# Patient Record
Sex: FEMALE | Race: WHITE | NOT HISPANIC OR LATINO | URBAN - NONMETROPOLITAN AREA
[De-identification: names, ages, dates, MRNs, and addresses within clinical notes are randomized per-mention and may not be internally consistent; named-entity substitution may affect disease eponyms.]

---

## 2020-07-07 ENCOUNTER — TRANSCRIBE ORDERS (OUTPATIENT)
Dept: ADMINISTRATIVE | Facility: HOSPITAL | Age: 70
End: 2020-07-07

## 2020-07-07 DIAGNOSIS — Z01.818 OTHER SPECIFIED PRE-OPERATIVE EXAMINATION: Primary | ICD-10-CM

## 2020-07-10 ENCOUNTER — LAB (OUTPATIENT)
Dept: LAB | Facility: HOSPITAL | Age: 70
End: 2020-07-10

## 2020-07-10 DIAGNOSIS — Z01.818 OTHER SPECIFIED PRE-OPERATIVE EXAMINATION: ICD-10-CM

## 2020-07-10 PROCEDURE — U0004 COV-19 TEST NON-CDC HGH THRU: HCPCS

## 2020-07-10 PROCEDURE — U0002 COVID-19 LAB TEST NON-CDC: HCPCS

## 2020-07-10 PROCEDURE — C9803 HOPD COVID-19 SPEC COLLECT: HCPCS

## 2020-07-11 LAB
REF LAB TEST METHOD: NORMAL
SARS-COV-2 RNA RESP QL NAA+PROBE: NOT DETECTED

## 2020-08-12 ENCOUNTER — TRANSCRIBE ORDERS (OUTPATIENT)
Dept: ADMINISTRATIVE | Facility: HOSPITAL | Age: 70
End: 2020-08-12

## 2020-08-12 DIAGNOSIS — Z01.818 OTHER SPECIFIED PRE-OPERATIVE EXAMINATION: Primary | ICD-10-CM

## 2020-08-14 ENCOUNTER — LAB (OUTPATIENT)
Dept: LAB | Facility: HOSPITAL | Age: 70
End: 2020-08-14

## 2020-08-14 DIAGNOSIS — Z01.818 OTHER SPECIFIED PRE-OPERATIVE EXAMINATION: ICD-10-CM

## 2020-08-14 PROCEDURE — U0002 COVID-19 LAB TEST NON-CDC: HCPCS

## 2020-08-14 PROCEDURE — C9803 HOPD COVID-19 SPEC COLLECT: HCPCS

## 2020-08-14 PROCEDURE — U0004 COV-19 TEST NON-CDC HGH THRU: HCPCS

## 2020-08-17 LAB
REF LAB TEST METHOD: NORMAL
SARS-COV-2 RNA RESP QL NAA+PROBE: NOT DETECTED

## 2022-05-29 ENCOUNTER — HOSPITAL ENCOUNTER (INPATIENT)
Dept: HOSPITAL 79 - ER1 | Age: 72
LOS: 3 days | Discharge: HOME | DRG: 392 | End: 2022-06-01
Attending: INTERNAL MEDICINE | Admitting: INTERNAL MEDICINE
Payer: MEDICARE

## 2022-05-29 VITALS — BODY MASS INDEX: 35.16 KG/M2 | HEIGHT: 68 IN | WEIGHT: 232 LBS

## 2022-05-29 DIAGNOSIS — K57.20: Primary | ICD-10-CM

## 2022-05-29 DIAGNOSIS — Z82.49: ICD-10-CM

## 2022-05-29 DIAGNOSIS — Z79.899: ICD-10-CM

## 2022-05-29 DIAGNOSIS — Z90.49: ICD-10-CM

## 2022-05-29 DIAGNOSIS — Z98.890: ICD-10-CM

## 2022-05-29 DIAGNOSIS — E78.5: ICD-10-CM

## 2022-05-29 DIAGNOSIS — R91.8: ICD-10-CM

## 2022-05-29 DIAGNOSIS — D72.829: ICD-10-CM

## 2022-05-29 LAB
BUN/CREATININE RATIO: 17 (ref 0–10)
BUN/CREATININE RATIO: 20 (ref 0–10)
HGB BLD-MCNC: 15.8 GM/DL (ref 12.3–15.3)
RED BLOOD COUNT: 5 M/UL (ref 4–5.1)
WHITE BLOOD COUNT: 19.2 K/UL (ref 4.5–11)

## 2022-05-29 PROCEDURE — C9113 INJ PANTOPRAZOLE SODIUM, VIA: HCPCS

## 2022-05-30 LAB
BUN/CREATININE RATIO: 22 (ref 0–10)
HGB BLD-MCNC: 14.3 GM/DL (ref 12.3–15.3)
RED BLOOD COUNT: 4.59 M/UL (ref 4–5.1)
WHITE BLOOD COUNT: 17.3 K/UL (ref 4.5–11)

## 2022-05-31 LAB
BUN/CREATININE RATIO: 18 (ref 0–10)
HGB BLD-MCNC: 12.6 GM/DL (ref 12.3–15.3)
RED BLOOD COUNT: 4.03 M/UL (ref 4–5.1)
WHITE BLOOD COUNT: 10 K/UL (ref 4.5–11)

## 2022-06-01 LAB
BUN/CREATININE RATIO: 15 (ref 0–10)
HGB BLD-MCNC: 12.9 GM/DL (ref 12.3–15.3)
RED BLOOD COUNT: 4.13 M/UL (ref 4–5.1)
WHITE BLOOD COUNT: 8.8 K/UL (ref 4.5–11)

## 2024-11-21 NOTE — PROGRESS NOTES
"NAME: Kristyn Goff    : 1950    DATE OF CONSULTATION: 2024    REASON FOR REFERRAL: Malignant neoplasm of upper-outer quadrant of left breast in female, estrogen receptor positive     REFERRING PHYSICIAN:  Winnie He MD     TREATMENT HISTORY:   Left mastectomy and SLNBx  with Prophylactic Contralateral Mastectomy performed by Dr. Winnie He 24    CHIEF COMPLAINT:  Breast Cancer    HISTORY OF PRESENT ILLNESS:   Kristyn Goff is a very pleasant 74 y.o. female who is being seen today in the presence of her son and daughter-in-law at the request of Maria Esther He MD for evaluation and treatment of breast cancer.  Ms. Goff says she first noticed a \"knot\" in her left breast about 15 years prior around .  Then it was maybe the size of a marble.  She says it enlarged over time until it was about the size of a lime.  She was seeing her PEP, JOHN Toledo who noticed the lump during an exam and ordered further evaluation.  She had biopsy of two areas in the L breast as well as L axillary LN and this showed invasive moderately differentiated ductal carcinoma with micropapillary features Tumor was >95% 3+ ER+, 81-90% 2+ [place]+, HER-2/Estelita - (0).  L axillary LN was involved.  Dr. He took her for L mastectomy w/ SLNBx and prophylactic contralateral mastectomy as below on 24.  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 Tumors were 30 mm, 4 mm.  There was associated high grade DCIS.  4/5 LN were involved (3/4 SLN and 1/1 other LN), the largest being 22 mm with extranodal extension.  R breast was without malignancy.    Ms. Goff is recovering reasonably well.  She has healed somewhat slowly and still has L axillary drain in place but she is making steady progress and now says she has minimal output from the axillary drain.  She is to see Dr. He on Wednesday.   She denies weight loss.  No " "fevers or chills. No chest pain or shortness of breath. No abdominal pain, n/v/d/c, no rectal bleeding.  No bony pain. No headaches or visual disturbance.  They do complain that she has developed a \"place\" on her back they are concerned about that they want me to look at today.    Of note, prior to her surgery she had CT imaging as below which showed several small, nonspecific lung nodules in the R lung (5-6 mm).  Of note similar finding was noted on CTPE protocol from 23).  MRI of the brain also showed a 3 mm enhancing lesion in the L frontal bone which could represent hemangioma although metastatic lesion could not be completely excluded.  Bone scan was clear.      PAST MEDICAL HISTORY:  Past Medical History:   Diagnosis Date    Breast cancer     GERD (gastroesophageal reflux disease)     High cholesterol    -  H/o Diverticulosis  -  H/o Hyperparathyroidism    Risk Factors:  Age of Menarche: 9 yo  Age of Menopause: around age 54  Prior Breast Disease: Denies prior bx but says this lesion was present for maybe 15 yrs before she sought attention.  Pregnancies:    Age of 1st pregnancy:23  Family History of Breast Cancer: denies  Family History of Ovarian Cancer:Denies  History of HRT use:  Denies       PAST SURGICAL HISTORY:  Past Surgical History:   Procedure Laterality Date    GALLBLADDER SURGERY     -  Parathyrodectomy  -  S/p mario cataract surgery w/ implant placement  -  CCU    FAMILY HISTORY:  Family History   Problem Relation Age of Onset    Heart attack Father     Hypertension Sister     Heart attack Sister     Diabetes Sister    -  Denies family h/o malignancy of any kind    SOCIAL HISTORY:  Social History     Socioeconomic History    Marital status:    Tobacco Use    Smoking status: Former     Types: Cigarettes    Smokeless tobacco: Never   Vaping Use    Vaping status: Never Used   Substance and Sexual Activity    Alcohol use: Never    Drug use: Never    Sexual activity: Defer   -  .  " "Lives alone.  Used to own a flower shop but retired around 2012.  Former smoker, quit around age 33 yo      REVIEW OF SYSTEMS:   A comprehensive 14 point review of systems was performed.  Significant findings as mentioned above.  All other systems reviewed and are negative.      MEDICATIONS:  The current medication list was reviewed in the EMR    Current Outpatient Medications:     atorvastatin (LIPITOR) 20 MG tablet, Take 1 tablet by mouth Daily., Disp: , Rfl:     hydroCHLOROthiazide 25 MG tablet, Take 1 tablet by mouth Daily., Disp: , Rfl:     sulfamethoxazole-trimethoprim (Bactrim DS) 800-160 MG per tablet, Take 1 tablet by mouth 2 (Two) Times a Day for 10 days., Disp: 20 tablet, Rfl: 0    ALLERGIES:  No Known Allergies    PHYSICAL EXAM:  Vitals:    11/25/24 1348   BP: 137/58   Pulse: 54   Resp: 18   Temp: 97.1 °F (36.2 °C)   TempSrc: Temporal   SpO2: 97%   Weight: 103 kg (227 lb 3.2 oz)   Height: 172.7 cm (68\")   PainSc: 0-No pain   Body surface area is 2.16 meters squared.   Body mass index is 34.55 kg/m².   ECOG score: 0     PHQ-2 Depression Screening  Little interest or pleasure in doing things? Not at all   Feeling down, depressed, or hopeless? Not at all   PHQ-2 Total Score 0         General:  Awake, alert and oriented, in no distress  HEENT:  Pupils are equal, round and reactive to light and accommodation, Extra-ocular movements full, Oropharyx clear, mucous membranes moist  Neck:  No JVD, thyromegaly or lymphadenopathy  CV:  Regular rate and rhythm, no murmurs, rubs or gallops  Resp:  Lungs are clear to auscultation bilaterally, no wheezing  Breast:  S/p mario mastectomies.  Surgical incisions are healing.  There is a little bit of seroma fluid present in the R chest.  She still has some areas that are kind of scabbed over over the medial L chest.  L drain present.    No palpable nodularity or masses.  No axillary LAD on either side.  Abd:  Soft, non-tender, non-distended, bowel sounds present, no palpable " organomegaly or masses  Ext:  No clubbing, cyanosis or Le edema  Back:  Over the lower L back near the waistline, she has an area of cellulitis maybe 8x2.5 cm with induration / thickening and erythema of the skin.  There is some element of abscess formation with purulent drainage.  Lymph:  No cervical, supraclavicular, axillary, inguinal or femoral adenopathy  Neuro:  MS as above, CN II-XII intact, grossly non-focal exam      PATHOLOGY:  8/28/24 11/4/24            ENDOSCOPY:        IMAGING:  Mammo Diagnostic Digital Tomosynthesis Bilateral With CAD (07/19/2024 10:42) & US Breast Left Limited (07/19/2024 11:35)   FINDINGS:    Irregular hypodense mass upper outer left breast, anterior depth, 1-2 o'clock radian, with pleomorphic calcifications, measures 3.9 x 2.4 cm (image 40 MLO, image 37 CC).      Irregular lesion with coarse calcification is seen at left 2-3 o'clock radian, posterior depth measuring 1.7 x 1.5 cm (image 26 MLO, image 23 CC).      Enlarged left axillary lymph nodes are seen.      Benign secretory calcifications are seen bilaterally.      No additional suspicious masses, tissue distortion, or suspicious calcifications are identified.      Ultrasound:      Real time, targeted, technologist performed sonographic imaging of the left breast was performed utilizing a multihertz transducer.       Ultrasound was performed in the left upper outer quadrant and axilla.      --- Lobulated mass is seen at left 11-2 o'clock radian, 4 cm from the nipple, central vascular flow, 5.0 x 2.9 x 3.4 cm. This corresponds to the mammogram.    --- Spiculated lesion at 2-3 o'clock radian, 9 cm from the nipple, measuring 1.7 x 1.0 x 1.1 cm. This corresponds to the distortion seen on mammogram.      Enlarging nodes are seen in the left axilla. The largest node measures 2.0 x 2.5 x 1.3 cm.     IMPRESSION:  1.   No mammographic evidence of malignancy is are seen in the right breast.        2.   Breast masses at left  11-3 o'clock radian. These are highly suspicious for malignant process.    3. Enlarging nodes in the left axilla which are suspicious for metastatic disease.         RECOMMENDED FOLLOWUP: Ultrasound guided biopsy of the large mass at 11-2 o'clock radian, left 2-3 o'clock radian and one of the left axillary lymph nodes.      BI-RADS category 5: Highly suggestive of malignancy.       MRI Abdomen With & Without Contrast (08/14/2024 14:05)   FINDINGS:      LOWER CHEST: Lung bases are clear. Small hiatal hernia.      LIVER: Normal contours. No mass. Vascular shunt in the dome (for example on bolus phase series, image 119). No steatosis. No intrahepatic biliary dilatation. Portal and hepatic veins are patent.      GALLBLADDER: Removed.      COMMON BILE DUCT: Normal caliber. No stones.       SPLEEN: Within normal limits.      PANCREAS: No mass. No pancreatic fluid collections. The pancreatic duct is normal.      ADRENALS: No masses.      KIDNEYS: No solid left renal lesion identified, to correspond to the abnormality described on recent ultrasound. A parapelvic cyst in the left kidney measuring 2 cm. A couple of small cortical cysts in the right kidney measuring up to 1.3 cm. No hydronephrosis.      LYMPH NODES: No adenopathy.      STOMACH, SMALL BOWEL AND COLON: No bowel wall thickening or obstruction.      PERITONEAL CAVITY: No mesenteric stranding or free fluid.      ABDOMINAL AORTA: No aneurysm.      SOFT TISSUES: Normal.      OSSEOUS STRUCTURES: No acute fracture or destructive lesion.     IMPRESSION:  1.   No solid left renal lesion identified, to correspond to the abnormality described on recent ultrasound. Few bilateral renal cysts, including a parapelvic cyst in the left kidney measuring 2 cm.   2.   Additional noncontributory findings described above.     Mammo Diagnostic Digital Tomosynthesis Left With CAD (08/28/2024 09:46) & US Breast Left Limited (08/28/2024 11:11)   FINDINGS:   Findings: There are multiple  suspicious findings in the left breast as listed below:     Finding 1: There is a irregular mass with associated calcifications abutting the skin, measuring mammographically 45 x 42 x 30 mm. Targeted left breast ultrasound at the 1:00 position, 4 cm from the revealed a corresponding sonographic irregular mass with calcifications measuring sonographically 4.6 x 2.7 x 3 cm. This extends to the skin. Finding is hard Elastography. Finding is highly suspicious.     Finding 2: There is a irregular mass is associated architectural distortion and coarse central calcification as well as fine: calcifications in the approximate 2:00 position of the left breast middle to posterior depth, 13 cm from the nipple. Targeted left breast ultrasound the 2:00 position 13 cm from the reveals an irregular 8 x 6 x 6 mm mass, with adjacent vascularity, intermediate Elastography.   Finding is highly suspicious.     Finding 3: In the 12:00 position of the left breast there is a 45 mm span of fine pleomorphic calcifications with linear/segmental distribution with associated subtle asymmetry best appreciated in the magnified views, full field CC, slice 40 of 79 as well as ML slice 47/91. These are located approximately 8 cm from nipple. Targeted right breast ultrasound in the left breast at the 12:00 position 8 cm from the nipple identifies an irregular mass, soft on Elastography, measuring 14 x 11 x 12 mm, which appears underestimated sonographically compared to mammographic span of 45 mm linear calcifications. Finding is highly suspicious     Finding 4: There are additional small highly suspicious asymmetries with microcalcifications scattered throughout the inferior left breast and lower inner quadrant in the approximate 6:00-9:00 position middle to posterior depth: The total span of these lower inner quadrant asymmetries measures up to 10 x 7 x 6 cm as best appreciated on CC full-field, slice 13 of 79 and ML slice 27 out of 92. Findings are  highly suspicious indicating multicentric disease.     Finding 5: There are  2 abnormal lymph nodes in the left axilla corresponding to completely replaced lymph nodes labeled as lymph node1 and 2 on US; these are highly suspicious with no demonstration of fatty hilum. This lymph nodes measure respectively 22 mm and 18 mm each, adjacent to each other.       Right breast: A repeat right diagnostic mammogram was not performed as review of outside right breast mammogram did not reveal any suspicious areas of concern.     IMPRESSION:  BI-RADS Code: BI-RADS 5, Highly suggestive of malignancy.   Multicentric left breast disease with highly suspicious at least 2 left axillary lymph nodes     RECOMMENDATIONS:   Left Breast Recommendations: Ultrasound Guided Breast Biopsy for findings 1 and 2 to confirm multicentric disease.     Fine Needle Aspiration     Right diagnostic mammogram in one year.     US Axilla Left (09/12/2024 12:02)   FINDINGS:   Final mammographic images demonstrate the localizer tag to be located within the left axillary lymph node and adjacent to the twirl clip.     Left Breast Density: The breast tissue is heterogeneously dense, which may obscure small masses.     IMPRESSION:  Successful localization of the left axillary lymph node with a pink smartclip.     MR Head w and wo IV Contrast (09/25/2024 08:32)   Findings:     No enhancing lesions to suggest intracranial metastatic disease.     No evidence of restricted diffusion to suggest acute territorial infarct.     No evidence of mass effect, midline shift, ration, hydrocephalus.     No acute appearing intracranial hemorrhage.  No extra-axial fluid collections.  Scattered periventricular and subcortical T2/FLAIR hyperintense foci within the supratentorial brain, probably related to small vessel ischemic changes.  Age-appropriate ventricular size and configuration.     Basal cisterns are patent.  Major intracranial flow voids are preserved.     Paranasal  sinuses  are clear.  Mastoid cells are clear.  Postsurgical changes of the orbits.     3 mm enhancing lesion within the left frontal bone could be related to hemangioma (series 6 image 20, series 13 image 20), although underlying metastatic disease cannot be totally excluded.  Attention to this area on follow-up imaging.     No additional calvarial lesions.     Impression:  No abnormal intraparenchymal postcontrast enhancement to suggest intraparenchymal metastatic disease.      No acute intracranial process.  Sequelae of small vessel ischemic changes.     3 mm enhancing lesion within the left frontal bone could be related to hemangioma (series 6 image 20, series 13 image 20), although underlying metastatic disease cannot be totally excluded.  Attention to this area on follow-up imaging.     CT Abdomen Pelvis With Contrast (09/25/2024 12:40)   FINDINGS:   Mediastinum and Pleura: No mediastinal or hilar adenopathy. No pleural or pericardial effusion.     Lungs: 5 mm peripheral right upper lobe nodule, image 49 of series 3. Subpleural 6 mm peripheral right middle lobe nodule, image 58 series 3. Subpleural 6 mm right basal nodule, image 73 of series 3. No acute airspace disease. The central airways are unremarkable.     Abdomen and Pelvis: No focal hepatic lesions. Gallbladder is removed. No biliary dilatation. No splenic lesions. Normal adrenal glands. No pancreatic lesions or peripancreatic stranding. Exophytic right renal cortical cysts. No suspicious renal parenchymal lesions. No renal collecting system dilatation.     Calcifications involving abdominal aorta, bilateral iliac vessels and SMA, with associated moderate to significant stenosis of the distal SMA trunk.     No enlarged lymph nodes. No mesenteric or retroperitoneal nodularity. No free fluid or air within the abdomen or pelvis.     Unremarkable stomach. Normal caliber of small and large bowel. Distal colonic diverticulosis without evidence of  complications.     Urinary bladder is within normal limits. No abnormal soft tissue densities in the regions of adnexa.     Musculoskeletal: Right breast is only partially included, without discrete suspicious lesions noted. There is a biopsied lesion within left breast, image 34 of series 2, measures up to 2.1 cm. There is also a left axillary lymphadenopathy, status post biopsy, with example of left axillary lymph node on image 37 of series 2 measuring up to 1.7 cm.     No aggressive osseous lesions or acute fractures.     IMPRESSION:  Left breast lesion and left axillary lymphadenopathy, representing known breast cancer with metastasis.   Several peripheral right lung nodules, nonspecific.   No enlarged mediastinal lymph nodes.   No suspicious lesions within the abdomen or pelvis.   No suspicious osseous lesions are identified.     CT Chest With Contrast Diagnostic (09/25/2024 12:40)   FINDINGS:   Mediastinum and Pleura: No mediastinal or hilar adenopathy. No pleural or pericardial effusion.     Lungs: 5 mm peripheral right upper lobe nodule, image 49 of series 3. Subpleural 6 mm peripheral right middle lobe nodule, image 58 series 3. Subpleural 6 mm right basal nodule, image 73 of series 3. No acute airspace disease. The central airways are unremarkable.     Abdomen and Pelvis: No focal hepatic lesions. Gallbladder is removed. No biliary dilatation. No splenic lesions. Normal adrenal glands. No pancreatic lesions or peripancreatic stranding. Exophytic right renal cortical cysts. No suspicious renal parenchymal lesions. No renal collecting system dilatation.     Calcifications involving abdominal aorta, bilateral iliac vessels and SMA, with associated moderate to significant stenosis of the distal SMA trunk.     No enlarged lymph nodes. No mesenteric or retroperitoneal nodularity. No free fluid or air within the abdomen or pelvis.     Unremarkable stomach. Normal caliber of small and large bowel. Distal colonic  diverticulosis without evidence of complications.     Urinary bladder is within normal limits. No abnormal soft tissue densities in the regions of adnexa.     Musculoskeletal: Right breast is only partially included, without discrete suspicious lesions noted. There is a biopsied lesion within left breast, image 34 of series 2, measures up to 2.1 cm. There is also a left axillary lymphadenopathy, status post biopsy, with example of left axillary lymph node on image 37 of series 2 measuring up to 1.7 cm.     No aggressive osseous lesions or acute fractures.     IMPRESSION:  Left breast lesion and left axillary lymphadenopathy, representing known breast cancer with metastasis.   Several peripheral right lung nodules, nonspecific.   No enlarged mediastinal lymph nodes.   No suspicious lesions within the abdomen or pelvis.   No suspicious osseous lesions are identified.     NM Bone Scan Whole Body (09/25/2024 13:41)   FINDINGS:   Bones: No sites of focal increased (hot) and decreased (cold) uptake in axial and appendicular skeleton to suggest bone metastasis.     Bones/Joints: Sites of mild uptake consistent with benign arthritic, degenerative or post traumatic changes.     Soft-tissues: Asymmetric mild uptake in the left breast soft tissues relative to the right. Two kidneys visualized.     IMPRESSION:  No evidence of osseous metastatic disease.     RECENT LABS:  Lab Results   Component Value Date    WBC 9.58 11/25/2024    HGB 12.2 11/25/2024    HCT 38.4 11/25/2024    MCV 95.3 11/25/2024    RDW 13.2 11/25/2024     11/25/2024    NEUTRORELPCT 72.0 11/25/2024    LYMPHORELPCT 18.3 (L) 11/25/2024    MONORELPCT 7.2 11/25/2024    EOSRELPCT 1.8 11/25/2024    BASORELPCT 0.5 11/25/2024    NEUTROABS 6.90 11/25/2024    LYMPHSABS 1.75 11/25/2024       Lab Results   Component Value Date     11/25/2024    K 4.0 11/25/2024    CO2 24.7 11/25/2024     11/25/2024    BUN 23 11/25/2024    CREATININE 1.11 (H) 11/25/2024     GLUCOSE 120 (H) 11/25/2024    CALCIUM 9.2 11/25/2024    ALKPHOS 158 (H) 11/25/2024    AST 21 11/25/2024    ALT 16 11/25/2024    BILITOT 0.4 11/25/2024    ALBUMIN 4.1 11/25/2024    PROTEINTOT 7.1 11/25/2024         ASSESSMENT & PLAN:  Kristyn Goff is a very pleasant 74 y.o. female with Stage IIIA (pT2(m)N2aMX moderately differentiated, multifocal invasive ductal carcinoma with micropapillary features.  Tumors were 30 mm and 4 mm in diameter.  There was associated high grade DCIS.  Surgical margins were clear.  She had involvement of 3/4 SLN and 1/1 other LN.  Largest LN metastasis was 22 mm with extranodal extension.  Tumor was >95% 3+ ER+, 81-90% 2+ [place]+, HER-2/Estelita - (0).    1.  Left Breast Cancer:  -  Patient and family report that the mass was present for about 15 years before she had it evaluated.  -  She is s/p successful L mastectomy w/ clear margins and prophylactic contralateral mastectomy performed by Dr. Winnie He 11/4/24.  -  She has been a little slow to heal but this is going reasonably well.  She has L axillary drain in place and is to see Dr. He on Wednesday for removal.  -  I have recommended adjuvant chemotherapy to consist of DDAC x 4 with growth factor support followed by weekly Taxol x 12. We discussed potential risks benefits and side effects and she would like to proceed.  Will obtain labwork today.  She will need echocardiogram to make sure LVEF is adequate.  She has no known cardiac history but doesn't often see physicians.  -  Will need PAC placement given vesicant therapy so will refer back to Dr. He for this purpose.  -  At some point, decision will have to be made about whether or not to return to the OR for axillary dissection.    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  -  Following adjuvant radiation she will require hormonal therapy.  Will plan to get DEXA closer to that time.    2.  Abscess L back:  -  This will need I&D I  believe.  She is to see Dr. He on Wednesday.  -  Will start Bactrim DS I BID.  Suspect this will need treatment before PAC can be safely placed.    3.  Abnormal imaging findings:  -  She had CT CAP performed 9/25/24 which showed several tiny, nonspecific lung nodules in the R lung measuring 5-6 mm.  These were previously described on CTPE imaging 4/30/23.  Will need f/u CT imaging after an interval.  -  She also had 3 mm enhancing lesion in the L frontal bone noted on Brain MRI 9/25/24.  Could be c/w hemangioma.  Could not completely r/o metastatic lesion.  Bone scan 9/25/24 was negative.     -  Will plan to reimage after an interval.    4.  Prophylaxis:  -  Kristyn Goff did not receive 2024 flu vaccine.  This was recommended but declined.  -  Kristyn Goff did not receive Prevnar vaccine.  This was recommended but declined.  -  Kristyn Goff did not receive COVID vaccine.  This was recommended but declined.      5.  ACO / DANK/Other  Quality measures  -  Kristyn Goff did not receive 2024 flu vaccine.  This was recommended but declined.  -  Kristyn Goff reports a pain score of 0.  Given her pain assessment as noted, treatment options were discussed and the following options were decided upon as a follow-up plan to address the patient's pain:  No intervention needed at this time .  -  Current outpatient and discharge medications have been reconciled for the patient.  Reviewed by: Kaylee Tellez MD    6.  F/u:  -  Check baseline labwork today  -  Bactrim DS I tab BID x 10d  -  Refer back to Dr. He for possible I&D abscess and PAC placement.  She is to see Dr. He Wednesday and anticpates drain removal at that time.  -  Check echocardiogram  -  Work on chemotherapy approvals  -  Schedule chemotherapy teaching.  -  I will plan to see her back when she starts her treatment to make sure infection is resolved.        I spent 60 minutes with Kristyn Goff today.  In the office today, more than 50%  of this time was spent face-to-face with her  in counseling / coordination of care, reviewing her medical history and counseling on the current treatment plan.  All questions were answered to her satisfaction      Electronically Signed by: Kaylee Tellez MD      CC:     JOHN Toledo DO Veronica Morgan Jones, MD

## 2024-11-25 ENCOUNTER — LAB (OUTPATIENT)
Dept: ONCOLOGY | Facility: CLINIC | Age: 74
End: 2024-11-25
Payer: MEDICARE

## 2024-11-25 ENCOUNTER — CONSULT (OUTPATIENT)
Dept: ONCOLOGY | Facility: CLINIC | Age: 74
End: 2024-11-25
Payer: MEDICARE

## 2024-11-25 VITALS
HEIGHT: 68 IN | SYSTOLIC BLOOD PRESSURE: 137 MMHG | DIASTOLIC BLOOD PRESSURE: 58 MMHG | RESPIRATION RATE: 18 BRPM | OXYGEN SATURATION: 97 % | WEIGHT: 227.2 LBS | HEART RATE: 54 BPM | TEMPERATURE: 97.1 F | BODY MASS INDEX: 34.43 KG/M2

## 2024-11-25 DIAGNOSIS — L02.212 ABSCESS OF BACK: ICD-10-CM

## 2024-11-25 DIAGNOSIS — Z17.0 MALIGNANT NEOPLASM OF OVERLAPPING SITES OF LEFT BREAST IN FEMALE, ESTROGEN RECEPTOR POSITIVE: Primary | ICD-10-CM

## 2024-11-25 DIAGNOSIS — C50.812 MALIGNANT NEOPLASM OF OVERLAPPING SITES OF LEFT BREAST IN FEMALE, ESTROGEN RECEPTOR POSITIVE: ICD-10-CM

## 2024-11-25 DIAGNOSIS — R53.83 OTHER FATIGUE: ICD-10-CM

## 2024-11-25 DIAGNOSIS — C50.812 MALIGNANT NEOPLASM OF OVERLAPPING SITES OF LEFT BREAST IN FEMALE, ESTROGEN RECEPTOR POSITIVE: Primary | ICD-10-CM

## 2024-11-25 DIAGNOSIS — Z17.0 MALIGNANT NEOPLASM OF OVERLAPPING SITES OF LEFT BREAST IN FEMALE, ESTROGEN RECEPTOR POSITIVE: ICD-10-CM

## 2024-11-25 DIAGNOSIS — E21.3 HYPERPARATHYROIDISM: ICD-10-CM

## 2024-11-25 DIAGNOSIS — Z51.81 ENCOUNTER FOR MONITORING CARDIOTOXIC DRUG THERAPY: ICD-10-CM

## 2024-11-25 DIAGNOSIS — Z79.899 ENCOUNTER FOR MONITORING CARDIOTOXIC DRUG THERAPY: ICD-10-CM

## 2024-11-25 LAB
ALBUMIN SERPL-MCNC: 4.1 G/DL (ref 3.5–5.2)
ALBUMIN/GLOB SERPL: 1.4 G/DL
ALP SERPL-CCNC: 158 U/L (ref 39–117)
ALT SERPL W P-5'-P-CCNC: 16 U/L (ref 1–33)
ANION GAP SERPL CALCULATED.3IONS-SCNC: 15.3 MMOL/L (ref 5–15)
AST SERPL-CCNC: 21 U/L (ref 1–32)
BASOPHILS # BLD AUTO: 0.05 10*3/MM3 (ref 0–0.2)
BASOPHILS NFR BLD AUTO: 0.5 % (ref 0–1.5)
BILIRUB SERPL-MCNC: 0.4 MG/DL (ref 0–1.2)
BUN SERPL-MCNC: 23 MG/DL (ref 8–23)
BUN/CREAT SERPL: 20.7 (ref 7–25)
CALCIUM SPEC-SCNC: 9.2 MG/DL (ref 8.6–10.5)
CHLORIDE SERPL-SCNC: 100 MMOL/L (ref 98–107)
CO2 SERPL-SCNC: 24.7 MMOL/L (ref 22–29)
CREAT SERPL-MCNC: 1.11 MG/DL (ref 0.57–1)
DEPRECATED RDW RBC AUTO: 46.2 FL (ref 37–54)
EGFRCR SERPLBLD CKD-EPI 2021: 51.9 ML/MIN/1.73
EOSINOPHIL # BLD AUTO: 0.17 10*3/MM3 (ref 0–0.4)
EOSINOPHIL NFR BLD AUTO: 1.8 % (ref 0.3–6.2)
ERYTHROCYTE [DISTWIDTH] IN BLOOD BY AUTOMATED COUNT: 13.2 % (ref 12.3–15.4)
FERRITIN SERPL-MCNC: 87.37 NG/ML (ref 13–150)
GLOBULIN UR ELPH-MCNC: 3 GM/DL
GLUCOSE SERPL-MCNC: 120 MG/DL (ref 65–99)
HCT VFR BLD AUTO: 38.4 % (ref 34–46.6)
HGB BLD-MCNC: 12.2 G/DL (ref 12–15.9)
IMM GRANULOCYTES # BLD AUTO: 0.02 10*3/MM3 (ref 0–0.05)
IMM GRANULOCYTES NFR BLD AUTO: 0.2 % (ref 0–0.5)
IRON 24H UR-MRATE: 40 MCG/DL (ref 37–145)
IRON SATN MFR SERPL: 9 % (ref 20–50)
LYMPHOCYTES # BLD AUTO: 1.75 10*3/MM3 (ref 0.7–3.1)
LYMPHOCYTES NFR BLD AUTO: 18.3 % (ref 19.6–45.3)
MCH RBC QN AUTO: 30.3 PG (ref 26.6–33)
MCHC RBC AUTO-ENTMCNC: 31.8 G/DL (ref 31.5–35.7)
MCV RBC AUTO: 95.3 FL (ref 79–97)
MONOCYTES # BLD AUTO: 0.69 10*3/MM3 (ref 0.1–0.9)
MONOCYTES NFR BLD AUTO: 7.2 % (ref 5–12)
NEUTROPHILS NFR BLD AUTO: 6.9 10*3/MM3 (ref 1.7–7)
NEUTROPHILS NFR BLD AUTO: 72 % (ref 42.7–76)
NRBC BLD AUTO-RTO: 0 /100 WBC (ref 0–0.2)
PLATELET # BLD AUTO: 398 10*3/MM3 (ref 140–450)
PMV BLD AUTO: 9.8 FL (ref 6–12)
POTASSIUM SERPL-SCNC: 4 MMOL/L (ref 3.5–5.2)
PROT SERPL-MCNC: 7.1 G/DL (ref 6–8.5)
RBC # BLD AUTO: 4.03 10*6/MM3 (ref 3.77–5.28)
SODIUM SERPL-SCNC: 140 MMOL/L (ref 136–145)
TIBC SERPL-MCNC: 459 MCG/DL (ref 298–536)
TRANSFERRIN SERPL-MCNC: 308 MG/DL (ref 200–360)
TSH SERPL DL<=0.05 MIU/L-ACNC: 2.79 UIU/ML (ref 0.27–4.2)
WBC NRBC COR # BLD AUTO: 9.58 10*3/MM3 (ref 3.4–10.8)

## 2024-11-25 PROCEDURE — 99205 OFFICE O/P NEW HI 60 MIN: CPT | Performed by: INTERNAL MEDICINE

## 2024-11-25 PROCEDURE — 84443 ASSAY THYROID STIM HORMONE: CPT | Performed by: INTERNAL MEDICINE

## 2024-11-25 PROCEDURE — 85025 COMPLETE CBC W/AUTO DIFF WBC: CPT | Performed by: INTERNAL MEDICINE

## 2024-11-25 PROCEDURE — 82728 ASSAY OF FERRITIN: CPT | Performed by: INTERNAL MEDICINE

## 2024-11-25 PROCEDURE — 84466 ASSAY OF TRANSFERRIN: CPT | Performed by: INTERNAL MEDICINE

## 2024-11-25 PROCEDURE — 82746 ASSAY OF FOLIC ACID SERUM: CPT | Performed by: INTERNAL MEDICINE

## 2024-11-25 PROCEDURE — 86300 IMMUNOASSAY TUMOR CA 15-3: CPT | Performed by: INTERNAL MEDICINE

## 2024-11-25 PROCEDURE — 1126F AMNT PAIN NOTED NONE PRSNT: CPT | Performed by: INTERNAL MEDICINE

## 2024-11-25 PROCEDURE — 82607 VITAMIN B-12: CPT | Performed by: INTERNAL MEDICINE

## 2024-11-25 PROCEDURE — 80053 COMPREHEN METABOLIC PANEL: CPT | Performed by: INTERNAL MEDICINE

## 2024-11-25 PROCEDURE — 82306 VITAMIN D 25 HYDROXY: CPT | Performed by: INTERNAL MEDICINE

## 2024-11-25 PROCEDURE — 83540 ASSAY OF IRON: CPT | Performed by: INTERNAL MEDICINE

## 2024-11-25 RX ORDER — ATORVASTATIN CALCIUM 20 MG/1
20 TABLET, FILM COATED ORAL DAILY
COMMUNITY
Start: 2024-09-30

## 2024-11-25 RX ORDER — HYDROCHLOROTHIAZIDE 25 MG/1
25 TABLET ORAL DAILY
COMMUNITY
Start: 2024-08-20 | End: 2025-08-20

## 2024-11-25 RX ORDER — SULFAMETHOXAZOLE AND TRIMETHOPRIM 800; 160 MG/1; MG/1
1 TABLET ORAL 2 TIMES DAILY
Qty: 20 TABLET | Refills: 0 | Status: SHIPPED | OUTPATIENT
Start: 2024-11-25 | End: 2024-12-05

## 2024-11-25 NOTE — PROGRESS NOTES
Venipuncture Blood Specimen Collection  Venipuncture performed in right arm by Marta Bazan MA with good hemostasis. Patient tolerated the procedure well without complications.   11/25/24   Marta Bazan MA

## 2024-11-26 DIAGNOSIS — L02.212 ABSCESS OF BACK: ICD-10-CM

## 2024-11-26 DIAGNOSIS — C50.812 MALIGNANT NEOPLASM OF OVERLAPPING SITES OF LEFT BREAST IN FEMALE, ESTROGEN RECEPTOR POSITIVE: Primary | ICD-10-CM

## 2024-11-26 DIAGNOSIS — Z17.0 MALIGNANT NEOPLASM OF OVERLAPPING SITES OF LEFT BREAST IN FEMALE, ESTROGEN RECEPTOR POSITIVE: Primary | ICD-10-CM

## 2024-11-26 LAB
25(OH)D3 SERPL-MCNC: 26.3 NG/ML (ref 30–100)
CANCER AG15-3 SERPL-ACNC: 37.7 U/ML
FOLATE SERPL-MCNC: 7.32 NG/ML (ref 4.78–24.2)
VIT B12 BLD-MCNC: 335 PG/ML (ref 211–946)

## 2024-11-26 RX ORDER — SODIUM CHLORIDE 9 MG/ML
20 INJECTION, SOLUTION INTRAVENOUS ONCE
OUTPATIENT
Start: 2024-12-09

## 2024-11-26 RX ORDER — PALONOSETRON 0.05 MG/ML
0.25 INJECTION, SOLUTION INTRAVENOUS ONCE
OUTPATIENT
Start: 2024-12-09

## 2024-11-26 RX ORDER — DOXORUBICIN HYDROCHLORIDE 2 MG/ML
60 INJECTION, SOLUTION INTRAVENOUS ONCE
OUTPATIENT
Start: 2024-12-09

## 2024-11-26 RX ORDER — ERGOCALCIFEROL 1.25 MG/1
50000 CAPSULE, LIQUID FILLED ORAL WEEKLY
Qty: 4 CAPSULE | Refills: 5 | Status: SHIPPED | OUTPATIENT
Start: 2024-11-26

## 2024-11-27 LAB — CANCER AG27-29 SERPL-ACNC: 44.6 U/ML (ref 0–38.6)

## 2024-12-02 ENCOUNTER — PATIENT OUTREACH (OUTPATIENT)
Dept: ONCOLOGY | Facility: CLINIC | Age: 74
End: 2024-12-02
Payer: MEDICARE

## 2024-12-02 NOTE — SIGNIFICANT NOTE
Called patient on this date. The role of the Nurse Navigator was introduced to patient.  NN addressed illness, understanding, and provided clarification as needed. Time spent talking with patient, empathetic listening provided, and reassurance given. NN contact information was provided and encouraged patient to call with any questions or concerns. Pt. Verbalized understanding. NN will follow and assist PRN.

## 2024-12-11 DIAGNOSIS — Z17.0 MALIGNANT NEOPLASM OF OVERLAPPING SITES OF LEFT BREAST IN FEMALE, ESTROGEN RECEPTOR POSITIVE: Primary | ICD-10-CM

## 2024-12-11 DIAGNOSIS — C50.812 MALIGNANT NEOPLASM OF OVERLAPPING SITES OF LEFT BREAST IN FEMALE, ESTROGEN RECEPTOR POSITIVE: Primary | ICD-10-CM

## 2024-12-11 RX ORDER — SODIUM CHLORIDE 9 MG/ML
20 INJECTION, SOLUTION INTRAVENOUS ONCE
OUTPATIENT
Start: 2024-12-23

## 2024-12-11 RX ORDER — PALONOSETRON 0.05 MG/ML
0.25 INJECTION, SOLUTION INTRAVENOUS ONCE
OUTPATIENT
Start: 2025-01-06

## 2024-12-11 RX ORDER — DOXORUBICIN HYDROCHLORIDE 2 MG/ML
60 INJECTION, SOLUTION INTRAVENOUS ONCE
OUTPATIENT
Start: 2025-01-06

## 2024-12-11 RX ORDER — DOXORUBICIN HYDROCHLORIDE 2 MG/ML
60 INJECTION, SOLUTION INTRAVENOUS ONCE
OUTPATIENT
Start: 2024-12-23

## 2024-12-11 RX ORDER — SODIUM CHLORIDE 9 MG/ML
20 INJECTION, SOLUTION INTRAVENOUS ONCE
OUTPATIENT
Start: 2025-01-06

## 2024-12-11 RX ORDER — PALONOSETRON 0.05 MG/ML
0.25 INJECTION, SOLUTION INTRAVENOUS ONCE
OUTPATIENT
Start: 2024-12-23

## 2025-01-03 ENCOUNTER — HOSPITAL ENCOUNTER (OUTPATIENT)
Facility: HOSPITAL | Age: 75
Discharge: HOME | End: 2025-01-03
Payer: MEDICARE

## 2025-01-03 ENCOUNTER — OFFICE VISIT (OUTPATIENT)
Age: 75
End: 2025-01-03
Payer: MEDICARE

## 2025-01-03 VITALS — HEIGHT: 68 IN | BODY MASS INDEX: 34.4 KG/M2 | WEIGHT: 227 LBS

## 2025-01-03 DIAGNOSIS — C50.919 MALIGNANT NEOPLASM OF FEMALE BREAST, UNSPECIFIED ESTROGEN RECEPTOR STATUS, UNSPECIFIED LATERALITY, UNSPECIFIED SITE OF BREAST: Primary | ICD-10-CM

## 2025-01-03 DIAGNOSIS — Z17.0 MALIGNANT NEOPLASM OF OVERLAPPING SITES OF LEFT BREAST IN FEMALE, ESTROGEN RECEPTOR POSITIVE: ICD-10-CM

## 2025-01-03 DIAGNOSIS — Z51.81 ENCOUNTER FOR MONITORING CARDIOTOXIC DRUG THERAPY: ICD-10-CM

## 2025-01-03 DIAGNOSIS — C50.812 MALIGNANT NEOPLASM OF OVERLAPPING SITES OF LEFT BREAST IN FEMALE, ESTROGEN RECEPTOR POSITIVE: ICD-10-CM

## 2025-01-03 DIAGNOSIS — Z79.899 ENCOUNTER FOR MONITORING CARDIOTOXIC DRUG THERAPY: ICD-10-CM

## 2025-01-03 LAB
AV MEAN PRESS GRAD SYS DOP V1V2: 4.4 MMHG
AV VMAX SYS DOP: 127 CM/SEC
BH CV ECHO MEAS - AO MAX PG: 6.5 MMHG
BH CV ECHO MEAS - AO ROOT DIAM: 3.5 CM
BH CV ECHO MEAS - AO V2 VTI: 29.6 CM
BH CV ECHO MEAS - EDV(MOD-SP4): 34.6 ML
BH CV ECHO MEAS - EF(MOD-SP4): 80.9 %
BH CV ECHO MEAS - ESV(MOD-SP4): 6.6 ML
BH CV ECHO MEAS - IVS/LVPW: 1.07 CM
BH CV ECHO MEAS - IVSD: 2.2 CM
BH CV ECHO MEAS - LA DIMENSION: 3.8 CM
BH CV ECHO MEAS - LAT PEAK E' VEL: 9.8 CM/SEC
BH CV ECHO MEAS - LV DIASTOLIC VOL/BSA (35-75): 16 CM2
BH CV ECHO MEAS - LV MAX PG: 10.2 MMHG
BH CV ECHO MEAS - LV MEAN PG: 4.9 MMHG
BH CV ECHO MEAS - LV SYSTOLIC VOL/BSA (12-30): 3.1 CM2
BH CV ECHO MEAS - LV V1 MAX: 160 CM/SEC
BH CV ECHO MEAS - LV V1 VTI: 34.1 CM
BH CV ECHO MEAS - LVIDD: 2.8 CM
BH CV ECHO MEAS - LVIDS: 1.47 CM
BH CV ECHO MEAS - LVOT DIAM: 1.87 CM
BH CV ECHO MEAS - LVPWD: 2.05 CM
BH CV ECHO MEAS - MED PEAK E' VEL: 8.2 CM/SEC
BH CV ECHO MEAS - MV A MAX VEL: 1.1 CM/SEC
BH CV ECHO MEAS - MV E MAX VEL: 1.3 CM/SEC
BH CV ECHO MEAS - MV E/A: 0.9
BH CV ECHO MEAS - PA ACC TIME: 0.16 SEC
BH CV ECHO MEAS - RAP SYSTOLE: 10 MMHG
BH CV ECHO MEAS - RVSP: 25.4 MMHG
BH CV ECHO MEAS - SV(MOD-SP4): 28 ML
BH CV ECHO MEAS - SVI(MOD-SP4): 13 ML/M2
BH CV ECHO MEAS - TAPSE (>1.6): 2.6 CM
BH CV ECHO MEAS - TR MAX PG: 15.4 MMHG
BH CV ECHO MEAS - TR MAX VEL: 174 CM/SEC
BH CV ECHO MEASUREMENTS AVERAGE E/E' RATIO: 0.14
LEFT ATRIUM VOLUME INDEX: 13.1 ML/M2
LV EF 2D ECHO EST: 55 %

## 2025-01-03 PROCEDURE — 93306 TTE W/DOPPLER COMPLETE: CPT

## 2025-01-03 RX ORDER — SODIUM CHLORIDE 9 MG/ML
40 INJECTION, SOLUTION INTRAVENOUS AS NEEDED
OUTPATIENT
Start: 2025-01-03

## 2025-01-03 RX ORDER — SODIUM CHLORIDE 0.9 % (FLUSH) 0.9 %
3 SYRINGE (ML) INJECTION EVERY 12 HOURS SCHEDULED
OUTPATIENT
Start: 2025-01-03

## 2025-01-03 RX ORDER — SODIUM CHLORIDE 0.9 % (FLUSH) 0.9 %
10 SYRINGE (ML) INJECTION AS NEEDED
OUTPATIENT
Start: 2025-01-03

## 2025-01-03 NOTE — H&P (VIEW-ONLY)
"Date of Service: 1/3/2025    Subjective   Kristyn Goff is a 74 y.o. female is being in consultation today at the request of Tresa Osorio DO    Chief Complaint: Left breast cancer    Kristyn Goff is a 74 y.o. female with left breast cancer status post left mastectomy and sentinel lymph node biopsy and prophylactic right mastectomy with need for port placement.  She is not on blood thinners.    Past Medical History:   Diagnosis Date    Breast cancer     GERD (gastroesophageal reflux disease)     High cholesterol        Past Surgical History:   Procedure Laterality Date    GALLBLADDER SURGERY           Family History   Problem Relation Age of Onset    Heart attack Father     Hypertension Sister     Heart attack Sister     Diabetes Sister          Social History     Socioeconomic History    Marital status:    Tobacco Use    Smoking status: Former     Types: Cigarettes    Smokeless tobacco: Never   Vaping Use    Vaping status: Never Used   Substance and Sexual Activity    Alcohol use: Never    Drug use: Never    Sexual activity: Defer        Review of Systems   Pertinent items are noted in HPI     Constitutional: No fevers, chills or malaise. No unintentional weight loss   Eyes: Denies visual changes    Cardiovascular: Denies chest pain, palpitations   Respiratory: Denies cough or shortness of breath   Abdominal/Gastrointestinal: No abdominal pain, no nausea/vomiting   Genitourinary: Denies dysuria or hematuria   Musculoskeletal: Denies any chronic joint aches, pains or deformities              Skin: No lesions or rashes   Psychiatric: No recent mood changes   Neurologic: No paresthesias or loss of function        Objective       Physical Exam:      01/03/25  0845   Weight: 103 kg (227 lb)    Body mass index is 34.52 kg/m².  Constitution: Ht 172.7 cm (67.99\")   Wt 103 kg (227 lb)   BMI 34.52 kg/m²  . No acute distress  Head: Normocephalic, atraumatic.   Eyes: Aligned without strabismus. " Conjunctivae noninjected   Ears, Nose, Mouth:no lesions noted  CV: Regular rate and rhythm   Respiratory: Symmetric chest expansion. No respiratory distress.   Gastrointestinal:  Soft, nontender, nondistended   Skin:  No cyanosis, clubbing or edema bilaterally  Neurologic: No gross deficits.  Alert and oriented x3  Psychiatric: Normal mood      Assessment   Diagnoses and all orders for this visit:    1. Malignant neoplasm of female breast, unspecified estrogen receptor status, unspecified laterality, unspecified site of breast (Primary)  -     Case Request; Standing  -     Follow Anesthesia Guidelines / Protocol; Future  -     Follow Anesthesia Guidelines / Protocol; Standing  -     Verify / Perform Chlorhexidine Skin Prep; Standing  -     Notify Physician - Standard; Standing  -     Instructions on coughing, deep breathing, and incentive spirometry.; Standing  -     Type & Screen; Future  -     Insert Peripheral IV; Standing  -     Saline Lock & Maintain IV Access; Standing  -     sodium chloride 0.9 % flush 3 mL  -     sodium chloride 0.9 % flush 10 mL  -     sodium chloride 0.9 % infusion 40 mL  -     Place Sequential Compression Device; Standing  -     Maintain Sequential Compression Device; Standing  -     Case Request    2. Malignant neoplasm of overlapping sites of left breast in female, estrogen receptor positive      Kristyn Goff is a 74 y.o. female with need for central venous access for chemotherapy. I will plan for port placement on 1/13/25.            Karen Engle MD  [facility] Surgery

## 2025-01-03 NOTE — PROGRESS NOTES
"Date of Service: 1/3/2025    Subjective   Kristyn Goff is a 74 y.o. female is being in consultation today at the request of Tresa Osorio DO    Chief Complaint: Left breast cancer    Kristyn Goff is a 74 y.o. female with left breast cancer status post left mastectomy and sentinel lymph node biopsy and prophylactic right mastectomy with need for port placement.  She is not on blood thinners.    Past Medical History:   Diagnosis Date    Breast cancer     GERD (gastroesophageal reflux disease)     High cholesterol        Past Surgical History:   Procedure Laterality Date    GALLBLADDER SURGERY           Family History   Problem Relation Age of Onset    Heart attack Father     Hypertension Sister     Heart attack Sister     Diabetes Sister          Social History     Socioeconomic History    Marital status:    Tobacco Use    Smoking status: Former     Types: Cigarettes    Smokeless tobacco: Never   Vaping Use    Vaping status: Never Used   Substance and Sexual Activity    Alcohol use: Never    Drug use: Never    Sexual activity: Defer        Review of Systems   Pertinent items are noted in HPI     Constitutional: No fevers, chills or malaise. No unintentional weight loss   Eyes: Denies visual changes    Cardiovascular: Denies chest pain, palpitations   Respiratory: Denies cough or shortness of breath   Abdominal/Gastrointestinal: No abdominal pain, no nausea/vomiting   Genitourinary: Denies dysuria or hematuria   Musculoskeletal: Denies any chronic joint aches, pains or deformities              Skin: No lesions or rashes   Psychiatric: No recent mood changes   Neurologic: No paresthesias or loss of function        Objective       Physical Exam:      01/03/25  0845   Weight: 103 kg (227 lb)    Body mass index is 34.52 kg/m².  Constitution: Ht 172.7 cm (67.99\")   Wt 103 kg (227 lb)   BMI 34.52 kg/m²  . No acute distress  Head: Normocephalic, atraumatic.   Eyes: Aligned without strabismus. " Conjunctivae noninjected   Ears, Nose, Mouth:no lesions noted  CV: Regular rate and rhythm   Respiratory: Symmetric chest expansion. No respiratory distress.   Gastrointestinal:  Soft, nontender, nondistended   Skin:  No cyanosis, clubbing or edema bilaterally  Neurologic: No gross deficits.  Alert and oriented x3  Psychiatric: Normal mood      Assessment   Diagnoses and all orders for this visit:    1. Malignant neoplasm of female breast, unspecified estrogen receptor status, unspecified laterality, unspecified site of breast (Primary)  -     Case Request; Standing  -     Follow Anesthesia Guidelines / Protocol; Future  -     Follow Anesthesia Guidelines / Protocol; Standing  -     Verify / Perform Chlorhexidine Skin Prep; Standing  -     Notify Physician - Standard; Standing  -     Instructions on coughing, deep breathing, and incentive spirometry.; Standing  -     Type & Screen; Future  -     Insert Peripheral IV; Standing  -     Saline Lock & Maintain IV Access; Standing  -     sodium chloride 0.9 % flush 3 mL  -     sodium chloride 0.9 % flush 10 mL  -     sodium chloride 0.9 % infusion 40 mL  -     Place Sequential Compression Device; Standing  -     Maintain Sequential Compression Device; Standing  -     Case Request    2. Malignant neoplasm of overlapping sites of left breast in female, estrogen receptor positive      Kristyn Goff is a 74 y.o. female with need for central venous access for chemotherapy. I will plan for port placement on 1/13/25.            Karen Engle MD  [facility]- [facility] Surgery

## 2025-01-13 ENCOUNTER — HOSPITAL ENCOUNTER (OUTPATIENT)
Facility: HOSPITAL | Age: 75
Setting detail: HOSPITAL OUTPATIENT SURGERY
Discharge: HOME | End: 2025-01-13
Attending: SURGERY | Admitting: SURGERY
Payer: MEDICARE

## 2025-01-13 ENCOUNTER — APPOINTMENT (OUTPATIENT)
Dept: GENERAL RADIOLOGY | Facility: HOSPITAL | Age: 75
End: 2025-01-13
Payer: MEDICARE

## 2025-01-13 ENCOUNTER — ANESTHESIA (OUTPATIENT)
Dept: PERIOP | Facility: HOSPITAL | Age: 75
End: 2025-01-13
Payer: MEDICARE

## 2025-01-13 ENCOUNTER — ANESTHESIA EVENT (OUTPATIENT)
Dept: PERIOP | Facility: HOSPITAL | Age: 75
End: 2025-01-13
Payer: MEDICARE

## 2025-01-13 VITALS
DIASTOLIC BLOOD PRESSURE: 78 MMHG | BODY MASS INDEX: 34.71 KG/M2 | HEIGHT: 68 IN | WEIGHT: 229 LBS | HEART RATE: 68 BPM | TEMPERATURE: 97.5 F | SYSTOLIC BLOOD PRESSURE: 123 MMHG | RESPIRATION RATE: 18 BRPM | OXYGEN SATURATION: 98 %

## 2025-01-13 DIAGNOSIS — C50.919 MALIGNANT NEOPLASM OF FEMALE BREAST, UNSPECIFIED ESTROGEN RECEPTOR STATUS, UNSPECIFIED LATERALITY, UNSPECIFIED SITE OF BREAST: ICD-10-CM

## 2025-01-13 PROCEDURE — 25010000002 HEPARIN LOCK FLUSH PER 10 UNITS: Performed by: SURGERY

## 2025-01-13 PROCEDURE — 77001 FLUOROGUIDE FOR VEIN DEVICE: CPT | Performed by: SURGERY

## 2025-01-13 PROCEDURE — 25010000002 FENTANYL CITRATE (PF) 50 MCG/ML SOLUTION: Performed by: NURSE ANESTHETIST, CERTIFIED REGISTERED

## 2025-01-13 PROCEDURE — 76000 FLUOROSCOPY <1 HR PHYS/QHP: CPT

## 2025-01-13 PROCEDURE — 36561 INSERT TUNNELED CV CATH: CPT | Performed by: SURGERY

## 2025-01-13 PROCEDURE — 25010000002 CEFAZOLIN PER 500 MG: Performed by: SURGERY

## 2025-01-13 PROCEDURE — 25010000002 LIDOCAINE PF 2% 2 % SOLUTION: Performed by: NURSE ANESTHETIST, CERTIFIED REGISTERED

## 2025-01-13 PROCEDURE — 76937 US GUIDE VASCULAR ACCESS: CPT | Performed by: SURGERY

## 2025-01-13 PROCEDURE — C1788 PORT, INDWELLING, IMP: HCPCS | Performed by: SURGERY

## 2025-01-13 PROCEDURE — 25010000002 PROPOFOL 200 MG/20ML EMULSION: Performed by: NURSE ANESTHETIST, CERTIFIED REGISTERED

## 2025-01-13 PROCEDURE — 71045 X-RAY EXAM CHEST 1 VIEW: CPT

## 2025-01-13 PROCEDURE — 25010000002 BUPIVACAINE 0.5 % SOLUTION: Performed by: SURGERY

## 2025-01-13 PROCEDURE — 25010000002 ONDANSETRON PER 1 MG: Performed by: NURSE ANESTHETIST, CERTIFIED REGISTERED

## 2025-01-13 DEVICE — POWERPORT CLEARVUE ISP IMPLANTABLE PORT WITH ATTACHABLE 8F POLYURETHANE OPEN-ENDED SINGLE-LUMEN VENOUS CATHETER PROCEDURAL KIT
Type: IMPLANTABLE DEVICE | Site: INTERNAL JUGULAR | Status: FUNCTIONAL
Brand: POWERPORT CLEARVUE

## 2025-01-13 RX ORDER — OXYCODONE AND ACETAMINOPHEN 5; 325 MG/1; MG/1
1 TABLET ORAL ONCE AS NEEDED
Status: DISCONTINUED | OUTPATIENT
Start: 2025-01-13 | End: 2025-01-13 | Stop reason: HOSPADM

## 2025-01-13 RX ORDER — SODIUM CHLORIDE 0.9 % (FLUSH) 0.9 %
3 SYRINGE (ML) INJECTION EVERY 12 HOURS SCHEDULED
Status: DISCONTINUED | OUTPATIENT
Start: 2025-01-13 | End: 2025-01-13 | Stop reason: HOSPADM

## 2025-01-13 RX ORDER — FENTANYL CITRATE 50 UG/ML
50 INJECTION, SOLUTION INTRAMUSCULAR; INTRAVENOUS
Status: DISCONTINUED | OUTPATIENT
Start: 2025-01-13 | End: 2025-01-13 | Stop reason: HOSPADM

## 2025-01-13 RX ORDER — SODIUM CHLORIDE 0.9 % (FLUSH) 0.9 %
10 SYRINGE (ML) INJECTION AS NEEDED
Status: DISCONTINUED | OUTPATIENT
Start: 2025-01-13 | End: 2025-01-13 | Stop reason: HOSPADM

## 2025-01-13 RX ORDER — HEPARIN SODIUM (PORCINE) LOCK FLUSH IV SOLN 100 UNIT/ML 100 UNIT/ML
SOLUTION INTRAVENOUS AS NEEDED
Status: DISCONTINUED | OUTPATIENT
Start: 2025-01-13 | End: 2025-01-13 | Stop reason: HOSPADM

## 2025-01-13 RX ORDER — TRAMADOL HYDROCHLORIDE 50 MG/1
50 TABLET ORAL 2 TIMES DAILY PRN
Qty: 10 TABLET | Refills: 0 | Status: SHIPPED | OUTPATIENT
Start: 2025-01-13 | End: 2025-01-15

## 2025-01-13 RX ORDER — FAMOTIDINE 10 MG/ML
INJECTION, SOLUTION INTRAVENOUS AS NEEDED
Status: DISCONTINUED | OUTPATIENT
Start: 2025-01-13 | End: 2025-01-13 | Stop reason: SURG

## 2025-01-13 RX ORDER — PROPOFOL 10 MG/ML
INJECTION, EMULSION INTRAVENOUS AS NEEDED
Status: DISCONTINUED | OUTPATIENT
Start: 2025-01-13 | End: 2025-01-13 | Stop reason: SURG

## 2025-01-13 RX ORDER — MIDAZOLAM HYDROCHLORIDE 1 MG/ML
0.5 INJECTION, SOLUTION INTRAMUSCULAR; INTRAVENOUS
Status: DISCONTINUED | OUTPATIENT
Start: 2025-01-13 | End: 2025-01-13 | Stop reason: HOSPADM

## 2025-01-13 RX ORDER — FENTANYL CITRATE 50 UG/ML
INJECTION, SOLUTION INTRAMUSCULAR; INTRAVENOUS AS NEEDED
Status: DISCONTINUED | OUTPATIENT
Start: 2025-01-13 | End: 2025-01-13 | Stop reason: SURG

## 2025-01-13 RX ORDER — LIDOCAINE HYDROCHLORIDE 20 MG/ML
INJECTION, SOLUTION EPIDURAL; INFILTRATION; INTRACAUDAL; PERINEURAL AS NEEDED
Status: DISCONTINUED | OUTPATIENT
Start: 2025-01-13 | End: 2025-01-13 | Stop reason: SURG

## 2025-01-13 RX ORDER — SODIUM CHLORIDE 9 MG/ML
40 INJECTION, SOLUTION INTRAVENOUS AS NEEDED
Status: DISCONTINUED | OUTPATIENT
Start: 2025-01-13 | End: 2025-01-13 | Stop reason: HOSPADM

## 2025-01-13 RX ORDER — BUPIVACAINE HYDROCHLORIDE 5 MG/ML
INJECTION, SOLUTION PERINEURAL AS NEEDED
Status: DISCONTINUED | OUTPATIENT
Start: 2025-01-13 | End: 2025-01-13 | Stop reason: HOSPADM

## 2025-01-13 RX ORDER — ONDANSETRON 2 MG/ML
4 INJECTION INTRAMUSCULAR; INTRAVENOUS AS NEEDED
Status: DISCONTINUED | OUTPATIENT
Start: 2025-01-13 | End: 2025-01-13 | Stop reason: HOSPADM

## 2025-01-13 RX ORDER — ONDANSETRON 2 MG/ML
INJECTION INTRAMUSCULAR; INTRAVENOUS AS NEEDED
Status: DISCONTINUED | OUTPATIENT
Start: 2025-01-13 | End: 2025-01-13 | Stop reason: SURG

## 2025-01-13 RX ORDER — IPRATROPIUM BROMIDE AND ALBUTEROL SULFATE 2.5; .5 MG/3ML; MG/3ML
3 SOLUTION RESPIRATORY (INHALATION) ONCE AS NEEDED
Status: DISCONTINUED | OUTPATIENT
Start: 2025-01-13 | End: 2025-01-13 | Stop reason: HOSPADM

## 2025-01-13 RX ORDER — SODIUM CHLORIDE 0.9 % (FLUSH) 0.9 %
10 SYRINGE (ML) INJECTION EVERY 12 HOURS SCHEDULED
Status: DISCONTINUED | OUTPATIENT
Start: 2025-01-13 | End: 2025-01-13 | Stop reason: HOSPADM

## 2025-01-13 RX ORDER — MEPERIDINE HYDROCHLORIDE 25 MG/ML
12.5 INJECTION INTRAMUSCULAR; INTRAVENOUS; SUBCUTANEOUS
Status: DISCONTINUED | OUTPATIENT
Start: 2025-01-13 | End: 2025-01-13 | Stop reason: HOSPADM

## 2025-01-13 RX ADMIN — ONDANSETRON 4 MG: 2 INJECTION INTRAMUSCULAR; INTRAVENOUS at 14:33

## 2025-01-13 RX ADMIN — ONDANSETRON 4 MG: 2 INJECTION INTRAMUSCULAR; INTRAVENOUS at 15:25

## 2025-01-13 RX ADMIN — PROPOFOL 160 MG: 10 INJECTION, EMULSION INTRAVENOUS at 14:15

## 2025-01-13 RX ADMIN — FAMOTIDINE 20 MG: 10 INJECTION, SOLUTION INTRAVENOUS at 14:21

## 2025-01-13 RX ADMIN — FENTANYL CITRATE 100 MCG: 50 INJECTION, SOLUTION INTRAMUSCULAR; INTRAVENOUS at 14:15

## 2025-01-13 RX ADMIN — LIDOCAINE HYDROCHLORIDE 100 MG: 20 INJECTION, SOLUTION EPIDURAL; INFILTRATION; INTRACAUDAL; PERINEURAL at 14:15

## 2025-01-13 RX ADMIN — FENTANYL CITRATE 50 MCG: 50 INJECTION, SOLUTION INTRAMUSCULAR; INTRAVENOUS at 15:04

## 2025-01-13 RX ADMIN — CEFAZOLIN 2000 MG: 2 INJECTION, POWDER, FOR SOLUTION INTRAMUSCULAR; INTRAVENOUS at 14:22

## 2025-01-13 RX ADMIN — FENTANYL CITRATE 50 MCG: 50 INJECTION, SOLUTION INTRAMUSCULAR; INTRAVENOUS at 14:59

## 2025-01-13 NOTE — DISCHARGE INSTRUCTIONS
DISCHARGE INSTRUCTIONS    You may shower in 24 hours. It is important that you let soap and water run over your wound to keep it clean. Pat dry with clean towel. Wound does not need to be covered (you have stitches that will dissolve under your skin. You have surgical glue that will fall off on its own).  Do not soak in a tub or go in a pool/swim in water for 2 weeks.  Take over the counter acetaminophen (tylenol) or ibuprofen (advil/motrin) as needed for pain control. These medications may be alternated, follow the recommendations on the medication bottle. Take your prescribed narcotic pain medications as needed for additional pain control. (DO NOT DRIVE WHILE TAKING NARCOTIC PAIN MEDICATION)  Please notify the general surgery clinic should you develop redness, worsening drainage, fever, or increasing pain at your incision site.       Clinic contact information:  [facility]  [facility] Location: [phone]

## 2025-01-13 NOTE — ANESTHESIA PREPROCEDURE EVALUATION
Anesthesia Evaluation     Patient summary reviewed and Nursing notes reviewed   no history of anesthetic complications:   NPO Solid Status: > 8 hours  NPO Liquid Status: > 8 hours           Airway   Mallampati: II  TM distance: >3 FB  Neck ROM: full  Dental - normal exam     Pulmonary - negative pulmonary ROS    breath sounds clear to auscultation  Cardiovascular     ECG reviewed  Rhythm: regular  Rate: normal    (+) hyperlipidemia      Neuro/Psych- negative ROS  GI/Hepatic/Renal/Endo    (+) obesity, GERD    Musculoskeletal (-) negative ROS    Abdominal   (+) obese    Abdomen: soft.   Substance History - negative use     OB/GYN negative ob/gyn ROS         Other      history of cancer (breast) active    ROS/Med Hx Other: 1/3/25    ·  Left ventricular systolic function is normal. Estimated left ventricular EF = 55%  ·  Left ventricular wall thickness is consistent with moderate concentric hypertrophy.  ·  Left ventricular diastolic function is consistent with (grade I) impaired relaxation.  ·  Estimated right ventricular systolic pressure from tricuspid regurgitation is normal (<35 mmHg).                     Anesthesia Plan    ASA 3     general and MAC     (General vs MAC)  intravenous induction     Anesthetic plan, risks, benefits, and alternatives have been provided, discussed and informed consent has been obtained with: patient.  Pre-procedure education provided  Use of blood products discussed with  Consented to blood products.    Plan discussed with CRNA.    CODE STATUS:

## 2025-01-13 NOTE — ANESTHESIA POSTPROCEDURE EVALUATION
Patient: Kristyn Goff    Procedure Summary       Date: 01/13/25 Room / Location:  [facility]  /  [facility]    Anesthesia Start: 1412 Anesthesia Stop: 1450    Procedure: INSERTION VENOUS ACCESS DEVICE (Right) Diagnosis:       Malignant neoplasm of female breast, unspecified estrogen receptor status, unspecified laterality, unspecified site of breast      (Malignant neoplasm of female breast, unspecified estrogen receptor status, unspecified laterality, unspecified site of breast [C50.919])    Surgeons: Karen Johnston MD Provider: Monty Das MD    Anesthesia Type: general, MAC ASA Status: 3            Anesthesia Type: general, MAC    Vitals  Vitals Value Taken Time   /74 01/13/25 1507   Temp 97.9 °F (36.6 °C) 01/13/25 1451   Pulse 79 01/13/25 1511   Resp 20 01/13/25 1506   SpO2 99 % 01/13/25 1511   Vitals shown include unfiled device data.        Post Anesthesia Care and Evaluation    Patient location during evaluation: PACU  Patient participation: complete - patient participated  Level of consciousness: responsive to verbal stimuli  Pain score: 0  Pain management: satisfactory to patient    Airway patency: patent  Anesthetic complications: No anesthetic complications  PONV Status: none  Cardiovascular status: hemodynamically stable  Respiratory status: nasal cannula  Hydration status: acceptable

## 2025-01-13 NOTE — ANESTHESIA PROCEDURE NOTES
Airway  Date/Time: 1/13/2025 2:21 PM  Airway not difficult    General Information and Staff    CRNA/CAA: Pete Cardenas, FALGUNI    Indications and Patient Condition  Mask difficulty assessment: 1 - vent by mask    Final Airway Details  Final airway type: supraglottic airway      Successful airway: Size 3  Airway Seal Pressure (cm H2O): 20     Number of attempts at approach: 1  Assessment: atraumatic intubation

## 2025-01-15 ENCOUNTER — LAB (OUTPATIENT)
Dept: ONCOLOGY | Facility: CLINIC | Age: 75
End: 2025-01-15
Payer: MEDICARE

## 2025-01-15 ENCOUNTER — OFFICE VISIT (OUTPATIENT)
Dept: ONCOLOGY | Facility: CLINIC | Age: 75
End: 2025-01-15
Payer: MEDICARE

## 2025-01-15 VITALS
RESPIRATION RATE: 18 BRPM | WEIGHT: 231.4 LBS | BODY MASS INDEX: 35.18 KG/M2 | OXYGEN SATURATION: 94 % | HEART RATE: 98 BPM | SYSTOLIC BLOOD PRESSURE: 138 MMHG | TEMPERATURE: 97.1 F | DIASTOLIC BLOOD PRESSURE: 71 MMHG

## 2025-01-15 DIAGNOSIS — Z17.0 MALIGNANT NEOPLASM OF OVERLAPPING SITES OF LEFT BREAST IN FEMALE, ESTROGEN RECEPTOR POSITIVE: Primary | ICD-10-CM

## 2025-01-15 DIAGNOSIS — C50.812 MALIGNANT NEOPLASM OF OVERLAPPING SITES OF LEFT BREAST IN FEMALE, ESTROGEN RECEPTOR POSITIVE: Primary | ICD-10-CM

## 2025-01-15 DIAGNOSIS — C50.812 MALIGNANT NEOPLASM OF OVERLAPPING SITES OF LEFT BREAST IN FEMALE, ESTROGEN RECEPTOR POSITIVE: ICD-10-CM

## 2025-01-15 DIAGNOSIS — Z17.0 MALIGNANT NEOPLASM OF OVERLAPPING SITES OF LEFT BREAST IN FEMALE, ESTROGEN RECEPTOR POSITIVE: ICD-10-CM

## 2025-01-15 LAB
ALBUMIN SERPL-MCNC: 3.7 G/DL (ref 3.5–5.2)
ALBUMIN/GLOB SERPL: 1.2 G/DL
ALP SERPL-CCNC: 140 U/L (ref 39–117)
ALT SERPL W P-5'-P-CCNC: 14 U/L (ref 1–33)
ANION GAP SERPL CALCULATED.3IONS-SCNC: 11.5 MMOL/L (ref 5–15)
AST SERPL-CCNC: 21 U/L (ref 1–32)
BASOPHILS # BLD AUTO: 0.04 10*3/MM3 (ref 0–0.2)
BASOPHILS NFR BLD AUTO: 0.5 % (ref 0–1.5)
BILIRUB SERPL-MCNC: 0.3 MG/DL (ref 0–1.2)
BUN SERPL-MCNC: 20 MG/DL (ref 8–23)
BUN/CREAT SERPL: 19.4 (ref 7–25)
CALCIUM SPEC-SCNC: 9 MG/DL (ref 8.6–10.5)
CHLORIDE SERPL-SCNC: 96 MMOL/L (ref 98–107)
CO2 SERPL-SCNC: 29.5 MMOL/L (ref 22–29)
CREAT SERPL-MCNC: 1.03 MG/DL (ref 0.57–1)
DEPRECATED RDW RBC AUTO: 45.1 FL (ref 37–54)
EGFRCR SERPLBLD CKD-EPI 2021: 56.8 ML/MIN/1.73
EOSINOPHIL # BLD AUTO: 0.21 10*3/MM3 (ref 0–0.4)
EOSINOPHIL NFR BLD AUTO: 2.6 % (ref 0.3–6.2)
ERYTHROCYTE [DISTWIDTH] IN BLOOD BY AUTOMATED COUNT: 13.2 % (ref 12.3–15.4)
GLOBULIN UR ELPH-MCNC: 3.1 GM/DL
GLUCOSE SERPL-MCNC: 117 MG/DL (ref 65–99)
HCT VFR BLD AUTO: 39.5 % (ref 34–46.6)
HGB BLD-MCNC: 12.4 G/DL (ref 12–15.9)
IMM GRANULOCYTES # BLD AUTO: 0.03 10*3/MM3 (ref 0–0.05)
IMM GRANULOCYTES NFR BLD AUTO: 0.4 % (ref 0–0.5)
LYMPHOCYTES # BLD AUTO: 1.59 10*3/MM3 (ref 0.7–3.1)
LYMPHOCYTES NFR BLD AUTO: 19.7 % (ref 19.6–45.3)
MCH RBC QN AUTO: 29.5 PG (ref 26.6–33)
MCHC RBC AUTO-ENTMCNC: 31.4 G/DL (ref 31.5–35.7)
MCV RBC AUTO: 93.8 FL (ref 79–97)
MONOCYTES # BLD AUTO: 0.57 10*3/MM3 (ref 0.1–0.9)
MONOCYTES NFR BLD AUTO: 7.1 % (ref 5–12)
NEUTROPHILS NFR BLD AUTO: 5.63 10*3/MM3 (ref 1.7–7)
NEUTROPHILS NFR BLD AUTO: 69.7 % (ref 42.7–76)
NRBC BLD AUTO-RTO: 0 /100 WBC (ref 0–0.2)
PLATELET # BLD AUTO: 277 10*3/MM3 (ref 140–450)
PMV BLD AUTO: 10.6 FL (ref 6–12)
POTASSIUM SERPL-SCNC: 3.5 MMOL/L (ref 3.5–5.2)
PROT SERPL-MCNC: 6.8 G/DL (ref 6–8.5)
RBC # BLD AUTO: 4.21 10*6/MM3 (ref 3.77–5.28)
SODIUM SERPL-SCNC: 137 MMOL/L (ref 136–145)
WBC NRBC COR # BLD AUTO: 8.07 10*3/MM3 (ref 3.4–10.8)

## 2025-01-15 PROCEDURE — 36415 COLL VENOUS BLD VENIPUNCTURE: CPT | Performed by: INTERNAL MEDICINE

## 2025-01-15 PROCEDURE — 80053 COMPREHEN METABOLIC PANEL: CPT | Performed by: INTERNAL MEDICINE

## 2025-01-15 PROCEDURE — 85025 COMPLETE CBC W/AUTO DIFF WBC: CPT | Performed by: INTERNAL MEDICINE

## 2025-01-15 RX ORDER — ONDANSETRON 8 MG/1
8 TABLET, FILM COATED ORAL 3 TIMES DAILY PRN
Qty: 30 TABLET | Refills: 5 | Status: SHIPPED | OUTPATIENT
Start: 2025-01-15

## 2025-01-15 RX ORDER — IBUPROFEN 600 MG/1
600 TABLET, FILM COATED ORAL EVERY 8 HOURS PRN
Qty: 30 TABLET | Refills: 0 | Status: SHIPPED | OUTPATIENT
Start: 2025-01-15

## 2025-01-15 RX ORDER — LIDOCAINE/PRILOCAINE 2.5 %-2.5%
CREAM (GRAM) TOPICAL
Qty: 30 G | Refills: 1 | Status: SHIPPED | OUTPATIENT
Start: 2025-01-15

## 2025-01-15 RX ORDER — LORATADINE 10 MG/1
TABLET ORAL
Qty: 7 TABLET | Refills: 5 | Status: SHIPPED | OUTPATIENT
Start: 2025-01-15

## 2025-01-15 RX ORDER — PROCHLORPERAZINE MALEATE 10 MG
10 TABLET ORAL EVERY 6 HOURS PRN
Qty: 30 TABLET | Refills: 1 | Status: SHIPPED | OUTPATIENT
Start: 2025-01-15

## 2025-01-15 RX ORDER — OLANZAPINE 5 MG/1
TABLET ORAL
Qty: 8 TABLET | Refills: 1 | Status: SHIPPED | OUTPATIENT
Start: 2025-01-15 | End: 2025-02-12

## 2025-01-15 NOTE — PROGRESS NOTES
"CHEMOTHERAPY PREPARATION    Kristyn Goff  [id number]  1950    Chief Complaint: Chemotherapy Education    History of present illness:  Kristyn Goff is a 74 y.o. year old female who is here today for chemotherapy preparation and needs assessment. The patient has been diagnosed with breast cancer and is scheduled to begin treatment on 01/16/2025 with DD AC w/ Pegfilgrastim. She reports that wound to her left lower back is well healed and she has been cleared by wound care. She is wondering what kind of tops she could wear now that she is \"flat in the chest\". We discussed mastectomy bras which she declines at this time. She is otherwise without specific complaints.     Oncology History:    Oncology/Hematology History   Malignant neoplasm of overlapping sites of left breast in female, estrogen receptor positive   11/25/2024 Initial Diagnosis    Malignant neoplasm of overlapping sites of left breast in female, estrogen receptor positive     1/16/2025 -  Chemotherapy    OP Breast DD AC DOXOrubicin / Cyclophosphamide      3/13/2025 -  Chemotherapy    OP BREAST PACLitaxel Adjuvant (Weekly X 12)       Past Medical History:   Diagnosis Date    Breast cancer     Elevated cholesterol     GERD (gastroesophageal reflux disease)     High cholesterol      Past Surgical History:   Procedure Laterality Date    CATARACT EXTRACTION Right     COLONOSCOPY      GALLBLADDER SURGERY      MASTECTOMY Bilateral     PARATHYROIDECTOMY      VENOUS ACCESS DEVICE (PORT) INSERTION Right 1/13/2025    Procedure: INSERTION VENOUS ACCESS DEVICE;  Surgeon: Karen Johnston MD;  Location: [facility];  Service: General;  Laterality: Right;     Family History   Problem Relation Age of Onset    Heart attack Father     Hypertension Sister     Heart attack Sister     Diabetes Sister        Medications: The current medication list was reviewed and reconciled.     Allergies:  has No Known Allergies.    Review of Systems:  A comprehensive 14 point " review of systems was conducted with patient and positive as per HPI otherwise negative.    Physical Exam:    Vitals:    01/15/25 1054   BP: 138/71   Pulse: 98   Resp: 18   Temp: 97.1 °F (36.2 °C)   SpO2: 94%     Vitals:    01/15/25 1054   PainSc:   5        ECOG: (0) Fully Active - Able to Carry On All Pre-disease Performance Without Restriction    General/Constitutional: Awake, alert and oriented. No apparent acute distress is noted.  HEENT: Normocephalic, atraumatic. PERRLA, conjunctiva normal. Extraocular movements are intact.  Neck: No JVD, thyromegaly or cervical lymphadenopathy.  Cardiovascular: S1, S2. Regular rate and rhythm, no murmurs, rubs or gallops.  Respiratory: Pulmonary effort is normal, lungs are clear to auscultation bilaterally. No wheezing, rhonchi or rales. No use of accessory muscles, no retractions.  Abdomen: Soft, non-tender, non-distended with normoactive bowel sounds x 4 quadrants. No palpable hepatosplenomegaly.  Lymph: No cervical, supraclavicular, axillary, inguinal or femoral adenopathy.  Integumentary: Skin warm and dry. No bruising, ecchymosis.  Extremities: No clubbing, cyanosis or edema.  Neurological: Alert and oriented x 3. Grossly non-focal examination.            NEEDS ASSESSMENTS    Genetics  The patient's new diagnosis and family history have been reviewed for genetic counseling needs. A genetic referral is not recommended.     Barriers to care  A barriers form was also completed by the patient today. We discussed services offered by our facility to help her have adequate access to care. The patient was given the name and card for our Oncology Social Worker, Elena Garvin. Based upon barriers assessment today, the patient will not require a follow-up call from the  to further discuss needs.     VAD Assessment  The patient and I discussed planned intervenous chemotherapy as well as other IV treatments that are often needed throughout the course of treatment. These  "may include, but are not limited to blood transfusions, antibiotics, and IV hydration. The vasculature does not appear to be adequate for multiple peripheral IVs throughout their treatment course. Discussed risks and benefits of VADs. The patient would like to pursue Port-A-Cath insertion prior to initiation of treatment. Port-a-cath was placed 01/13/25 per Dr. Johnston.    Advanced Care Planning  The patient and I discussed advanced care planning, \"Conversations that Matter\".   This service was offered, free of charge, for development of advance directives with a certified ACP facilitator.  The patient does not have an up-to-date advanced directive. This document is not on file with our office. The patient is not interested in an appointment with one of our facilitators to create or update their advanced directives.      Palliative Care  The patient and I discussed palliative care services. Palliative care is not the same as Hospice care. This is specialized medical care for people living with serious illness with the goal of improving quality of life for the patient and their family. [facility] has partnered with [facility] Navigators to offer our patients outpatient palliative care early along with their treatment to assist in coordination of care, symptom management, pain management, and medical decision making.  Oncology criteria for palliative care referral is not met at this time. The patient is not interested in a palliative care consultation.     Additional Referral needs  none      CHEMOTHERAPY EDUCATION    Booklets Given: Chemotherapy and You [x]  Eating Hints [x]    Sexuality/Fertility Books []      Chemotherapy/Biotherapy Education Sheets: (list all that apply)  nausea management, acid reflux management, diarrhea management, Cancer resourse contacts information, skin and mouth care, and vaccination information                                                                                              "                                                                    Chemotherapy Regimen:   Treatment Plans       Name Type Plan Dates Plan Provider         Active    OP Breast DD AC DOXOrubicin / Cyclophosphamide  ONCOLOGY TREATMENT 12/8/2024 - Present Kaylee Tellez MD                      TOPICS EDUCATION PROVIDED COMMENTS   ANEMIA:  role of RBC, cause, s/s, ways to manage, role of transfusion [x]    THROMBOCYTOPENIA:  role of platelet, cause, s/s, ways to prevent bleeding, things to avoid, when to seek help [x]    NEUTROPENIA:  role of WBC, cause, infection precautions, s/s of infection, when to call MD [x]    NUTRITION & APPETITE CHANGES:  importance of maintaining healthy diet & weight, ways to manage to improve intake, dietary consult, exercise regimen [x]    DIARRHEA:  causes, s/s of dehydration, ways to manage, dietary changes, when to call MD [x]    CONSTIPATION:  causes, ways to manage, dietary changes, when to call MD [x]    NAUSEA & VOMITING:  cause, use of antiemetics, dietary changes, when to call MD [x]    MOUTH SORES:  causes, oral care, ways to manage [x]    ALOPECIA:  cause, ways to manage, resources [x]    INFERTILITY & SEXUALITY:  causes, fertility preservation options, sexuality changes, ways to manage, importance of birth control [x]    NERVOUS SYSTEM CHANGES:  causes, s/s, neuropathies, cognitive changes, ways to manage [x]    PAIN:  causes, ways to manage [x]    SKIN & NAIL CHANGES:  cause, s/s, ways to manage [x]    ORGAN TOXICITIES:  cause, s/s, need for diagnostic tests, labs, when to notify MD [x]    SURVIVORSHIP:  distress, distress assessment, secondary malignancies, early/late effects, follow-up, social issues, social support [x]    HOME CARE:  use of spill kits, storing of PO chemo, how to manage bodily fluids [x]    MISCELLANEOUS:  drug interactions, administration, vesicant, et [x]      Assessment and Plan:    Diagnoses and all orders for this visit:    1. Malignant neoplasm of  overlapping sites of left breast in female, estrogen receptor positive (Primary)  -     Provider communication  -     OLANZapine (zyPREXA) 5 MG tablet; Take 1 tablet by mouth for 4 doses starting night of chemotherapy.  Dispense: 8 tablet; Refill: 1  -     ondansetron (ZOFRAN) 8 MG tablet; Take 1 tablet by mouth 3 (Three) Times a Day As Needed for Nausea or Vomiting.  Dispense: 30 tablet; Refill: 5    Other orders  -     prochlorperazine (COMPAZINE) 10 MG tablet; Take 1 tablet by mouth Every 6 (Six) Hours As Needed for Nausea or Vomiting.  Dispense: 30 tablet; Refill: 1  -     lidocaine-prilocaine (EMLA) 2.5-2.5 % cream; Apply to port-a-cath site 30 minutes prior to arrival at infusion center. Cover with plastic wrap.  Dispense: 30 g; Refill: 1  -     loratadine (CLARITIN) 10 MG tablet; Take 1 tablet by mouth daily on the day following chemotherapy prior to growth factor injection and continue for 7 days.  Dispense: 7 tablet; Refill: 5  -     ibuprofen (ADVIL,MOTRIN) 600 MG tablet; Take 1 tablet by mouth Every 8 (Eight) Hours As Needed for Mild Pain. Please take on day following chemotherapy prior to growth factor injection.  Dispense: 30 tablet; Refill: 0      - Chemotherapy teaching was also completed today as documented above. Adequate time was given to answer all questions to her satisfaction. Patient and family are aware of their care team members and contact information if they have questions or problems throughout the treatment course. Needs assessments and education has been completed. The patient is adequately prepared to begin treatment as scheduled.   - Reviewed with patient education regarding EMLA cream, Compazine, and Zofran and prescriptions were sent to the pharmacy of patient's choice.  - I advised the patient that she can take Tylenol or Ibuprofen as needed for aches/pains related to cancer/treatment. I also advised patient that she could use Senakot or Miralax as needed for constipation or Imodium  as needed for diarrhea.    - I reviewed with the patient the care team members. I also reviewed the option of the urgent care clinic through our oncology office for evaluation and management of symptoms related to treatment. Patient was provided with phone number to call during regular office hours [phone] press #1 and for treatment related questions call [phone] to speak to a nurse. If after hours or on the weekend call [phone] and ask to page the MD on call for [facility] Oncology services.   - Consent for treatment with Adriamycin, Cyclophosphamide (DD AC) w/ Pegfilgrastim as recommended by Dr. Tellez for the treatment of breast cancer was signed by the patient today.           I spent 60 minutes with Kristyn Goff today.  In the office today, more than 50% of this time was spent face-to-face with her  in counseling / coordination of care, reviewing her interim medical history and counseling on the current treatment plan.  All questions were answered to her satisfaction.             Electronically Signed by: JOHN Peters

## 2025-01-15 NOTE — OP NOTE
OPERATIVE NOTE    Patient Name:  Kristyn Goff  YOB: 1950  [id number]    Date of Surgery:  1/14/2025    PREOPERATIVE DIAGNOSIS: Breast cancer, need for central venous access     POSTOPERATIVE DIAGNOSIS: Same      PROCEDURE PERFORMED: Right internal jugular port placement with fluoroscopic guidance    SURGEON: Karen Johnston MD     Circulator: Annie Ferguson RN  Radiology Technologist: Ramez Moreno, RT  Scrub Person: Namrata Calderón  Assistant: Venessa Rodriguez CSA     Assistant: Venessa Rodriguez CSA    SPECIMENS: None     EBL: 5     ANESTHESIA: General.      FINDINGS:   1. Successful placement of right internal jugular catheter using ultrasound and fluoroscopic guidance.     INDICATIONS: The patient is a 74 y.o. female with history of breast cancer requiring port placement. Risks and benefits were discussed and she elected to proceed with port placement.     DESCRIPTION OF PROCEDURE:   The patient was brought back to the operating room and was induced with general endotracheal anesthesia.  A towel roll was placed between the shoulder blades.  The right and left neck were prepped and the right neck was draped in sterile fashion.  A time-out was called and the operative consent was read to the operating room personnel.  The patient received preoperative antibiotics.  The patient was placed in the Trendelenburg position.  Using ultrasound guidance the right internal jugular vein was cannulated with a single pass of the needle.  A guidewire was advanced down to the superior vena cava and confirmed with fluoroscopy.  Local anesthetic was infiltrated into the skin near the right shoulder.  Using a 15 blade knife a 5 cm incision was made 2 fingerbreadths inferior and parallel to the right clavicle.  Using electrocautery a subcutaneous pocket was created inferior to the incision line.  The Port-A-Cath reservoir was inserted into the pocket and two 3-0 Prolenes were placed  but not tied down. The catheter was tunneled up the right neck.   The Prolene sutures were secured into the pocket. The catheter was cut to the appropriate length once confirmed with fluoroscopy.   A dilator was then advanced over the guidewire down to the superior vena cava under fluoroscopy. The guidewire and dilator were removed from the sheath.The catheter was advanced through the sheath down to the superior vena cava.  This was confirmed by real-time fluoroscopy.  The 3-0 Prolenes were tied down. The reservoir was aspirated easily with blood flow back and flushed with heparinized saline.  The subdermal tissues were reapproximated with a running 3-0 Vicryl suture.  The incision was closed with a 4-0 Monocryl subcuticular stitch. Dermabond was applied over the wound.  The patient tolerated the procedure well.  The patient was extubated in the operating room and taken to the postanesthesia care unit in stable condition and a stat x-ray was ordered to confirm placement.    COMPLICATIONS: None     Karen Johnston MD  1/14/2025  19:51 EST

## 2025-01-15 NOTE — PROGRESS NOTES
Venipuncture Blood Specimen Collection  Venipuncture performed in left hand by Kailyn Sparrow MA with good hemostasis. Patient tolerated the procedure well without complications.   01/15/25   Kailyn Sparrow MA

## 2025-01-15 NOTE — PROGRESS NOTES
"NAME: Kristyn Goff    : 1950    DATE: 2025    DIAGNOSIS: Malignant neoplasm of upper-outer quadrant of left breast in female, estrogen receptor positive     TREATMENT HISTORY:   Left mastectomy and SLNBx  with Prophylactic Contralateral Mastectomy performed by Dr. Winnie He 24      2.        CHIEF COMPLAINT:  Breast Cancer    HISTORY OF PRESENT ILLNESS:   Kristyn Goff is a very pleasant 74 y.o. female who is being seen today in the presence of her son and daughter-in-law at the request of Maria Esther He MD for evaluation and treatment of breast cancer.  Ms. Goff says she first noticed a \"knot\" in her left breast about 15 years prior around .  Then it was maybe the size of a marble.  She says it enlarged over time until it was about the size of a lime.  She was seeing her PEP, JOHN Toledo who noticed the lump during an exam and ordered further evaluation.  She had biopsy of two areas in the L breast as well as L axillary LN and this showed invasive moderately differentiated ductal carcinoma with micropapillary features Tumor was >95% 3+ ER+, 81-90% 2+ MO+, HER-2/Estelita - (0).  L axillary LN was involved.  Dr. He took her for L mastectomy w/ SLNBx and prophylactic contralateral mastectomy as below on 24.  Patholgy showed a Stage IIIA multifocal invasive ductal carcinoma of the left breast with micropapillary features. Tumors were 30 mm, 4 mm.  There was associated high grade DCIS.  4/5 LN were involved (3/4 SLN and 1/1 other LN), the largest being 22 mm with extranodal extension.  R breast was without malignancy.    Ms. Goff is recovering reasonably well.  She has healed somewhat slowly and still has L axillary drain in place but she is making steady progress and now says she has minimal output from the axillary drain.  She is to see Dr. He on Wednesday.   She denies weight loss.  No fevers or chills. No chest pain or shortness of breath. No abdominal " "pain, n/v/d/c, no rectal bleeding.  No bony pain. No headaches or visual disturbance.  They do complain that she has developed a \"place\" on her back they are concerned about that they want me to look at today.    Of note, prior to her surgery she had CT imaging as below which showed several small, nonspecific lung nodules in the R lung (5-6 mm).  Of note similar finding was noted on CTPE protocol from 4/30/23).  MRI of the brain also showed a 3 mm enhancing lesion in the L frontal bone which could represent hemangioma although metastatic lesion could not be completely excluded.  Bone scan was clear.        INTERVAL HISTORY:  Kristyn Goff is here today with her son for f/u of breast cancer.  Since she was here last, the abscess over her back has healed completely.  Drain removed from the L axilla.  PAC was placed.   She is here today for exam and to start chemotherapy.  She and her son seem to have some disagreements between them during her visit today.   Apparently the same was true during her chemotherapy education visit yesterday.  She says she feels comfortable and wants to proceed.  She has all of her medications with her today.  Her son says she isn't planning on taking \"the antidepressant.\"  When we looked into this further, she was hesitant to take Olanzapine.  I explained to the two of them that his was being used to prevent nausea/vomiting and not as a treatment for mental illness and strongly encouraged her to take it which she said she would.  Her son asked for a copy of a schedule detailing what medications she would be taking on which days and that schedule will be provided to her today.  She says she is feeling well.  She has no concerns today.    PAST MEDICAL HISTORY:  Past Medical History:   Diagnosis Date    Breast cancer     Elevated cholesterol     GERD (gastroesophageal reflux disease)     High cholesterol    -  H/o Diverticulosis  -  H/o Hyperparathyroidism    Risk Factors:  Age of Menarche: " 9 yo  Age of Menopause: around age 54  Prior Breast Disease: Denies prior bx but says this lesion was present for maybe 15 yrs before she sought attention.  Pregnancies:    Age of 1st pregnancy:23  Family History of Breast Cancer: denies  Family History of Ovarian Cancer:Denies  History of HRT use:  Denies       PAST SURGICAL HISTORY:  Past Surgical History:   Procedure Laterality Date    CATARACT EXTRACTION Right     COLONOSCOPY      GALLBLADDER SURGERY      MASTECTOMY Bilateral     PARATHYROIDECTOMY      VENOUS ACCESS DEVICE (PORT) INSERTION Right 2025    Procedure: INSERTION VENOUS ACCESS DEVICE;  Surgeon: Karen Johnston MD;  Location: [facility];  Service: General;  Laterality: Right;   -  Parathyrodectomy  -  S/p mario cataract surgery w/ implant placement  -  CCU    FAMILY HISTORY:  Family History   Problem Relation Age of Onset    Heart attack Father     Hypertension Sister     Heart attack Sister     Diabetes Sister    -  Denies family h/o malignancy of any kind    SOCIAL HISTORY:  Social History     Socioeconomic History    Marital status:    Tobacco Use    Smoking status: Former     Types: Cigarettes    Smokeless tobacco: Never   Vaping Use    Vaping status: Never Used   Substance and Sexual Activity    Alcohol use: Never    Drug use: Never    Sexual activity: Defer   -  .  Lives alone.  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  Former smoker, quit around age 35 yo      REVIEW OF SYSTEMS:   A comprehensive 14 point review of systems was performed.  Significant findings as mentioned above.  All other systems reviewed and are negative.      MEDICATIONS:  The current medication list was reviewed in the EMR    Current Outpatient Medications:     atorvastatin (LIPITOR) 20 MG tablet, Take 1 tablet by mouth Daily., Disp: , Rfl:     Carboxymethylcellulose Sodium (EYE DROPS OP), Apply 1 drop to eye(s) as directed by provider 2 (Two) Times a Day., Disp: , Rfl:      hydroCHLOROthiazide 25 MG tablet, Take 1 tablet by mouth Daily., Disp: , Rfl:     traMADol (Ultram) 50 MG tablet, Take 1 tablet by mouth 2 (Two) Times a Day As Needed for Moderate Pain. (Patient not taking: Reported on 1/15/2025), Disp: 10 tablet, Rfl: 0    vitamin D (ERGOCALCIFEROL) 1.25 MG (43312 UT) capsule capsule, Take 1 capsule by mouth 1 (One) Time Per Week., Disp: 4 capsule, Rfl: 5    ALLERGIES:  No Known Allergies    PHYSICAL EXAM:  Vitals:    01/16/25 1141   BP: 142/75   Pulse: 102   Resp: 18   Temp: 97.1 °F (36.2 °C)   TempSrc: Temporal   SpO2: 93%   Weight: 105 kg (232 lb 6.4 oz)   PainSc: 0-No pain     Body surface area is 2.17 meters squared.   Body mass index is 35.34 kg/m².   ECOG score: 0     General:  Awake, alert and oriented, in no distress  HEENT:  Pupils are equal, round and reactive to light and accommodation, Extra-ocular movements full, Oropharyx clear, mucous membranes moist  Neck:  No JVD, thyromegaly or lymphadenopathy  CV:  Regular rate and rhythm, no murmurs, rubs or gallops  Resp:  Lungs are clear to auscultation bilaterally, no crackles  Breast:  S/p mario mastectomies.  Surgical incisions are well-healed.    No palpable nodularity or masses.  All drains have been removed and drain sites are well-healed.  No axillary LAD on either side.  Abd:  Soft, non-tender, non-distended, bowel sounds present, no palpable organomegaly or masses  Ext:  No clubbing, cyanosis, no lower extremity edema  Back:  Over the lower L back near the waistline, she previously had an area of cellulitis maybe 8x2.5 cm with induration / thickening and erythema of the skin.  There was some element of abscess formation with purulent drainage.  This is now completely resolved without erythema warmth or fluctuance.  Lymph:  No cervical, supraclavicular, axillary, inguinal or femoral adenopathy  Neuro:  MS as above, CN II-XII intact, grossly non-focal  exam      PATHOLOGY:  8/28/24 11/4/24            ENDOSCOPY:        IMAGING:  Mammo Diagnostic Digital Tomosynthesis Bilateral With CAD (07/19/2024 10:42) & US Breast Left Limited (07/19/2024 11:35)   FINDINGS:    Irregular hypodense mass upper outer left breast, anterior depth, 1-2 o'clock radian, with pleomorphic calcifications, measures 3.9 x 2.4 cm (image 40 MLO, image 37 CC).      Irregular lesion with coarse calcification is seen at left 2-3 o'clock radian, posterior depth measuring 1.7 x 1.5 cm (image 26 MLO, image 23 CC).      Enlarged left axillary lymph nodes are seen.      Benign secretory calcifications are seen bilaterally.      No additional suspicious masses, tissue distortion, or suspicious calcifications are identified.      Ultrasound:      Real time, targeted, technologist performed sonographic imaging of the left breast was performed utilizing a multihertz transducer.       Ultrasound was performed in the left upper outer quadrant and axilla.      --- Lobulated mass is seen at left 11-2 o'clock radian, 4 cm from the nipple, central vascular flow, 5.0 x 2.9 x 3.4 cm. This corresponds to the mammogram.    --- Spiculated lesion at 2-3 o'clock radian, 9 cm from the nipple, measuring 1.7 x 1.0 x 1.1 cm. This corresponds to the distortion seen on mammogram.      Enlarging nodes are seen in the left axilla. The largest node measures 2.0 x 2.5 x 1.3 cm.     IMPRESSION:  1.   No mammographic evidence of malignancy is are seen in the right breast.        2.   Breast masses at left 11-3 o'clock radian. These are highly suspicious for malignant process.    3. Enlarging nodes in the left axilla which are suspicious for metastatic disease.         RECOMMENDED FOLLOWUP: Ultrasound guided biopsy of the large mass at 11-2 o'clock radian, left 2-3 o'clock radian and one of the left axillary lymph nodes.      BI-RADS category 5: Highly suggestive of malignancy.       MRI Abdomen With & Without Contrast  (08/14/2024 14:05)   FINDINGS:      LOWER CHEST: Lung bases are clear. Small hiatal hernia.      LIVER: Normal contours. No mass. Vascular shunt in the dome (for example on bolus phase series, image 119). No steatosis. No intrahepatic biliary dilatation. Portal and hepatic veins are patent.      GALLBLADDER: Removed.      COMMON BILE DUCT: Normal caliber. No stones.       SPLEEN: Within normal limits.      PANCREAS: No mass. No pancreatic fluid collections. The pancreatic duct is normal.      ADRENALS: No masses.      KIDNEYS: No solid left renal lesion identified, to correspond to the abnormality described on recent ultrasound. A parapelvic cyst in the left kidney measuring 2 cm. A couple of small cortical cysts in the right kidney measuring up to 1.3 cm. No hydronephrosis.      LYMPH NODES: No adenopathy.      STOMACH, SMALL BOWEL AND COLON: No bowel wall thickening or obstruction.      PERITONEAL CAVITY: No mesenteric stranding or free fluid.      ABDOMINAL AORTA: No aneurysm.      SOFT TISSUES: Normal.      OSSEOUS STRUCTURES: No acute fracture or destructive lesion.     IMPRESSION:  1.   No solid left renal lesion identified, to correspond to the abnormality described on recent ultrasound. Few bilateral renal cysts, including a parapelvic cyst in the left kidney measuring 2 cm.   2.   Additional noncontributory findings described above.     Mammo Diagnostic Digital Tomosynthesis Left With CAD (08/28/2024 09:46) & US Breast Left Limited (08/28/2024 11:11)   FINDINGS:   Findings: There are multiple suspicious findings in the left breast as listed below:     Finding 1: There is a irregular mass with associated calcifications abutting the skin, measuring mammographically 45 x 42 x 30 mm. Targeted left breast ultrasound at the 1:00 position, 4 cm from the revealed a corresponding sonographic irregular mass with calcifications measuring sonographically 4.6 x 2.7 x 3 cm. This extends to the skin. Finding is hard  Elastography. Finding is highly suspicious.     Finding 2: There is a irregular mass is associated architectural distortion and coarse central calcification as well as fine: calcifications in the approximate 2:00 position of the left breast middle to posterior depth, 13 cm from the nipple. Targeted left breast ultrasound the 2:00 position 13 cm from the reveals an irregular 8 x 6 x 6 mm mass, with adjacent vascularity, intermediate Elastography.   Finding is highly suspicious.     Finding 3: In the 12:00 position of the left breast there is a 45 mm span of fine pleomorphic calcifications with linear/segmental distribution with associated subtle asymmetry best appreciated in the magnified views, full field CC, slice 40 of 79 as well as ML slice 47/91. These are located approximately 8 cm from nipple. Targeted right breast ultrasound in the left breast at the 12:00 position 8 cm from the nipple identifies an irregular mass, soft on Elastography, measuring 14 x 11 x 12 mm, which appears underestimated sonographically compared to mammographic span of 45 mm linear calcifications. Finding is highly suspicious     Finding 4: There are additional small highly suspicious asymmetries with microcalcifications scattered throughout the inferior left breast and lower inner quadrant in the approximate 6:00-9:00 position middle to posterior depth: The total span of these lower inner quadrant asymmetries measures up to 10 x 7 x 6 cm as best appreciated on CC full-field, slice 13 of 79 and ML slice 27 out of 92. Findings are highly suspicious indicating multicentric disease.     Finding 5: There are  2 abnormal lymph nodes in the left axilla corresponding to completely replaced lymph nodes labeled as lymph node1 and 2 on US; these are highly suspicious with no demonstration of fatty hilum. This lymph nodes measure respectively 22 mm and 18 mm each, adjacent to each other.       Right breast: A repeat right diagnostic mammogram was  not performed as review of outside right breast mammogram did not reveal any suspicious areas of concern.     IMPRESSION:  BI-RADS Code: BI-RADS 5, Highly suggestive of malignancy.   Multicentric left breast disease with highly suspicious at least 2 left axillary lymph nodes     RECOMMENDATIONS:   Left Breast Recommendations: Ultrasound Guided Breast Biopsy for findings 1 and 2 to confirm multicentric disease.     Fine Needle Aspiration     Right diagnostic mammogram in one year.     US Axilla Left (09/12/2024 12:02)   FINDINGS:   Final mammographic images demonstrate the localizer tag to be located within the left axillary lymph node and adjacent to the twirl clip.     Left Breast Density: The breast tissue is heterogeneously dense, which may obscure small masses.     IMPRESSION:  Successful localization of the left axillary lymph node with a pink smartclip.     MR Head w and wo IV Contrast (09/25/2024 08:32)   Findings:     No enhancing lesions to suggest intracranial metastatic disease.     No evidence of restricted diffusion to suggest acute territorial infarct.     No evidence of mass effect, midline shift, ration, hydrocephalus.     No acute appearing intracranial hemorrhage.  No extra-axial fluid collections.  Scattered periventricular and subcortical T2/FLAIR hyperintense foci within the supratentorial brain, probably related to small vessel ischemic changes.  Age-appropriate ventricular size and configuration.     Basal cisterns are patent.  Major intracranial flow voids are preserved.     Paranasal sinuses  are clear.  Mastoid cells are clear.  Postsurgical changes of the orbits.     3 mm enhancing lesion within the left frontal bone could be related to hemangioma (series 6 image 20, series 13 image 20), although underlying metastatic disease cannot be totally excluded.  Attention to this area on follow-up imaging.     No additional calvarial lesions.     Impression:  No abnormal intraparenchymal  postcontrast enhancement to suggest intraparenchymal metastatic disease.      No acute intracranial process.  Sequelae of small vessel ischemic changes.     3 mm enhancing lesion within the left frontal bone could be related to hemangioma (series 6 image 20, series 13 image 20), although underlying metastatic disease cannot be totally excluded.  Attention to this area on follow-up imaging.     CT Abdomen Pelvis With Contrast (09/25/2024 12:40)   FINDINGS:   Mediastinum and Pleura: No mediastinal or hilar adenopathy. No pleural or pericardial effusion.     Lungs: 5 mm peripheral right upper lobe nodule, image 49 of series 3. Subpleural 6 mm peripheral right middle lobe nodule, image 58 series 3. Subpleural 6 mm right basal nodule, image 73 of series 3. No acute airspace disease. The central airways are unremarkable.     Abdomen and Pelvis: No focal hepatic lesions. Gallbladder is removed. No biliary dilatation. No splenic lesions. Normal adrenal glands. No pancreatic lesions or peripancreatic stranding. Exophytic right renal cortical cysts. No suspicious renal parenchymal lesions. No renal collecting system dilatation.     Calcifications involving abdominal aorta, bilateral iliac vessels and SMA, with associated moderate to significant stenosis of the distal SMA trunk.     No enlarged lymph nodes. No mesenteric or retroperitoneal nodularity. No free fluid or air within the abdomen or pelvis.     Unremarkable stomach. Normal caliber of small and large bowel. Distal colonic diverticulosis without evidence of complications.     Urinary bladder is within normal limits. No abnormal soft tissue densities in the regions of adnexa.     Musculoskeletal: Right breast is only partially included, without discrete suspicious lesions noted. There is a biopsied lesion within left breast, image 34 of series 2, measures up to 2.1 cm. There is also a left axillary lymphadenopathy, status post biopsy, with example of left axillary  lymph node on image 37 of series 2 measuring up to 1.7 cm.     No aggressive osseous lesions or acute fractures.     IMPRESSION:  Left breast lesion and left axillary lymphadenopathy, representing known breast cancer with metastasis.   Several peripheral right lung nodules, nonspecific.   No enlarged mediastinal lymph nodes.   No suspicious lesions within the abdomen or pelvis.   No suspicious osseous lesions are identified.     CT Chest With Contrast Diagnostic (09/25/2024 12:40)   FINDINGS:   Mediastinum and Pleura: No mediastinal or hilar adenopathy. No pleural or pericardial effusion.     Lungs: 5 mm peripheral right upper lobe nodule, image 49 of series 3. Subpleural 6 mm peripheral right middle lobe nodule, image 58 series 3. Subpleural 6 mm right basal nodule, image 73 of series 3. No acute airspace disease. The central airways are unremarkable.     Abdomen and Pelvis: No focal hepatic lesions. Gallbladder is removed. No biliary dilatation. No splenic lesions. Normal adrenal glands. No pancreatic lesions or peripancreatic stranding. Exophytic right renal cortical cysts. No suspicious renal parenchymal lesions. No renal collecting system dilatation.     Calcifications involving abdominal aorta, bilateral iliac vessels and SMA, with associated moderate to significant stenosis of the distal SMA trunk.     No enlarged lymph nodes. No mesenteric or retroperitoneal nodularity. No free fluid or air within the abdomen or pelvis.     Unremarkable stomach. Normal caliber of small and large bowel. Distal colonic diverticulosis without evidence of complications.     Urinary bladder is within normal limits. No abnormal soft tissue densities in the regions of adnexa.     Musculoskeletal: Right breast is only partially included, without discrete suspicious lesions noted. There is a biopsied lesion within left breast, image 34 of series 2, measures up to 2.1 cm. There is also a left axillary lymphadenopathy, status post  biopsy, with example of left axillary lymph node on image 37 of series 2 measuring up to 1.7 cm.     No aggressive osseous lesions or acute fractures.     IMPRESSION:  Left breast lesion and left axillary lymphadenopathy, representing known breast cancer with metastasis.   Several peripheral right lung nodules, nonspecific.   No enlarged mediastinal lymph nodes.   No suspicious lesions within the abdomen or pelvis.   No suspicious osseous lesions are identified.     NM Bone Scan Whole Body (09/25/2024 13:41)   FINDINGS:   Bones: No sites of focal increased (hot) and decreased (cold) uptake in axial and appendicular skeleton to suggest bone metastasis.     Bones/Joints: Sites of mild uptake consistent with benign arthritic, degenerative or post traumatic changes.     Soft-tissues: Asymmetric mild uptake in the left breast soft tissues relative to the right. Two kidneys visualized.     IMPRESSION:  No evidence of osseous metastatic disease.     Adult Transthoracic Echo Complete W/ Cont if Necessary Per Protocol (01/03/2025 09:52)   Interpretation Summary    Left ventricular systolic function is normal. Estimated left ventricular EF = 55%    Left ventricular wall thickness is consistent with moderate concentric hypertrophy.    Left ventricular diastolic function is consistent with (grade I) impaired relaxation.    Estimated right ventricular systolic pressure from tricuspid regurgitation is normal (<35 mmHg).    RECENT LABS:  Lab Results   Component Value Date    WBC 8.07 01/15/2025    HGB 12.4 01/15/2025    HCT 39.5 01/15/2025    MCV 93.8 01/15/2025    RDW 13.2 01/15/2025     01/15/2025    NEUTRORELPCT 69.7 01/15/2025    LYMPHORELPCT 19.7 01/15/2025    MONORELPCT 7.1 01/15/2025    EOSRELPCT 2.6 01/15/2025    BASORELPCT 0.5 01/15/2025    NEUTROABS 5.63 01/15/2025    LYMPHSABS 1.59 01/15/2025       Lab Results   Component Value Date     01/15/2025    K 3.5 01/15/2025    CO2 29.5 (H) 01/15/2025    CL 96 (L)  01/15/2025    BUN 20 01/15/2025    CREATININE 1.03 (H) 01/15/2025    GLUCOSE 117 (H) 01/15/2025    CALCIUM 9.0 01/15/2025    ALKPHOS 140 (H) 01/15/2025    AST 21 01/15/2025    ALT 14 01/15/2025    BILITOT 0.3 01/15/2025    ALBUMIN 3.7 01/15/2025    PROTEINTOT 6.8 01/15/2025     Lab Results   Component Value Date    FERRITIN 87.37 11/25/2024    IRON 40 11/25/2024    TIBC 459 11/25/2024    LABIRON 9 (L) 11/25/2024    TNGFXGNH10 335 11/25/2024    FOLATE 7.32 11/25/2024     Lab Results   Component Value Date    TSH 2.790 11/25/2024     Lab Results   Component Value Date    TZIA31NQ 26.3 (L) 11/25/2024     Lab Results   Component Value Date    LABCA2 44.6 (H) 11/25/2024      Lab Results   Component Value Date     37.7 (H) 11/25/2024       ASSESSMENT & PLAN:  Kristyn Goff is a very pleasant 74 y.o. female with Stage IIIA (pT2(m)N2aMX moderately differentiated, multifocal invasive ductal carcinoma with micropapillary features.  Tumors were 30 mm and 4 mm in diameter.  There was associated high grade DCIS.  Surgical margins were clear.  She had involvement of 3/4 SLN and 1/1 other LN.  Largest LN metastasis was 22 mm with extranodal extension.  Tumor was >95% 3+ ER+, 81-90% 2+ TN+, HER-2/Estelita - (0).    1.  Left Breast Cancer:  -  Patient and family report that the mass was present for about 15 years before she had it evaluated.  -  She is s/p successful L mastectomy w/ clear margins and prophylactic contralateral mastectomy performed by Dr. Winnie He 11/4/24.  -She is now well-healed from her surgery.  - Baseline echocardiogram performed 1/3/2025 showed low normal LVEF 55%.  -  I have recommended adjuvant chemotherapy to consist of DDAC x 4 with growth factor support followed by weekly Taxol x 12. We discussed potential risks benefits and side effects and she would like to proceed.    -Will proceed with cycle 1 of dose dense AC chemotherapy today.  She will be given a calendar with her treatment today with the  details on it that her son is requesting..    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  -  Following adjuvant radiation she will require hormonal therapy.  Will plan to get DEXA closer to that time.  - She will return in 1 week for toxicity check to see APRN.  - She will see me with cycle 2 of chemotherapy with treatment labs.    2.  Abscess L back:  -Resolved    3.  Abnormal imaging findings:  -  She had CT CAP performed 9/25/24 which showed several tiny, nonspecific lung nodules in the R lung measuring 5-6 mm.  These were previously described on CTPE imaging 4/30/23.  Will need f/u CT imaging after an interval.  -  She also had 3 mm enhancing lesion in the L frontal bone noted on Brain MRI 9/25/24.  Could be c/w hemangioma.  Could not completely r/o metastatic lesion.  Bone scan 9/25/24 was negative.     -  Will plan to reimage after an interval.    4.  Prophylaxis:  -  Kristyn Goff did not receive 2024 flu vaccine.  This was recommended but declined.  -  Kristyn Goff did not receive Prevnar vaccine.  This was recommended but declined.  -  Kristyn Goff did not receive COVID vaccine.  This was recommended but declined.      5. ACO / DANK/Other  Quality measures  -  Kristyn Goff did not receive 2024 flu vaccine.  This was recommended but declined.  -  Kristyn Goff reports a pain score of 0.  Given her pain assessment as noted, treatment options were discussed and the following options were decided upon as a follow-up plan to address the patient's pain:  No intervention needed at this time .  -  Current outpatient and discharge medications have been reconciled for the patient.  Reviewed by: Kaylee Tellez MD        6.  F/u:  -Return in 1 week to see APRN for toxicity check with CBC and CMP  - I will see her back in 2 weeks with cycle 2 with CBC and CMP  - She will be given a calendar with the medication schedule written out for her during her treatment  today      I spent 30 minutes with Kristyn JONES Shell today.  In the office today, more than 50% of this time was spent face-to-face with her  in counseling / coordination of care, reviewing her medical history and counseling on the current treatment plan.  All questions were answered to her satisfaction      Electronically Signed by: Kaylee Tellez MD      CC:     JOHN Toledo DO Veronica Morgan Jones, MD

## 2025-01-16 ENCOUNTER — OFFICE VISIT (OUTPATIENT)
Dept: ONCOLOGY | Facility: CLINIC | Age: 75
End: 2025-01-16
Payer: MEDICARE

## 2025-01-16 ENCOUNTER — INFUSION (OUTPATIENT)
Dept: ONCOLOGY | Facility: HOSPITAL | Age: 75
End: 2025-01-16
Payer: MEDICARE

## 2025-01-16 VITALS
HEART RATE: 78 BPM | OXYGEN SATURATION: 97 % | RESPIRATION RATE: 16 BRPM | TEMPERATURE: 97.1 F | SYSTOLIC BLOOD PRESSURE: 167 MMHG | DIASTOLIC BLOOD PRESSURE: 56 MMHG

## 2025-01-16 VITALS
SYSTOLIC BLOOD PRESSURE: 142 MMHG | HEART RATE: 102 BPM | DIASTOLIC BLOOD PRESSURE: 75 MMHG | TEMPERATURE: 97.1 F | OXYGEN SATURATION: 93 % | RESPIRATION RATE: 18 BRPM | BODY MASS INDEX: 35.34 KG/M2 | WEIGHT: 232.4 LBS

## 2025-01-16 DIAGNOSIS — Z17.0 MALIGNANT NEOPLASM OF OVERLAPPING SITES OF LEFT BREAST IN FEMALE, ESTROGEN RECEPTOR POSITIVE: Primary | ICD-10-CM

## 2025-01-16 DIAGNOSIS — C50.812 MALIGNANT NEOPLASM OF OVERLAPPING SITES OF LEFT BREAST IN FEMALE, ESTROGEN RECEPTOR POSITIVE: Primary | ICD-10-CM

## 2025-01-16 PROCEDURE — 25810000003 SODIUM CHLORIDE 0.9 % SOLUTION 250 ML FLEX CONT: Performed by: INTERNAL MEDICINE

## 2025-01-16 PROCEDURE — 25010000002 CYCLOPHOSPHAMIDE 1 GM/5ML SOLUTION 5 ML VIAL: Performed by: INTERNAL MEDICINE

## 2025-01-16 PROCEDURE — 25010000002 DEXAMETHASONE SODIUM PHOSPHATE 20 MG/5ML SOLUTION: Performed by: INTERNAL MEDICINE

## 2025-01-16 PROCEDURE — 96377 APPLICATON ON-BODY INJECTOR: CPT

## 2025-01-16 PROCEDURE — 96417 CHEMO IV INFUS EACH ADDL SEQ: CPT

## 2025-01-16 PROCEDURE — 96375 TX/PRO/DX INJ NEW DRUG ADDON: CPT

## 2025-01-16 PROCEDURE — 25010000002 HEPARIN LOCK FLUSH PER 10 UNITS

## 2025-01-16 PROCEDURE — 25010000002 FOSAPREPITANT PER 1 MG: Performed by: INTERNAL MEDICINE

## 2025-01-16 PROCEDURE — 25010000002 DOXORUBICIN PER 10 MG: Performed by: INTERNAL MEDICINE

## 2025-01-16 PROCEDURE — 25010000002 PEGFILGRASTIM 6 MG/0.6ML PREFILLED SYRINGE KIT: Performed by: INTERNAL MEDICINE

## 2025-01-16 PROCEDURE — 96413 CHEMO IV INFUSION 1 HR: CPT

## 2025-01-16 PROCEDURE — 99214 OFFICE O/P EST MOD 30 MIN: CPT | Performed by: INTERNAL MEDICINE

## 2025-01-16 PROCEDURE — 25010000002 PALONOSETRON 0.25 MG/5ML SOLUTION PREFILLED SYRINGE: Performed by: INTERNAL MEDICINE

## 2025-01-16 PROCEDURE — 1126F AMNT PAIN NOTED NONE PRSNT: CPT | Performed by: INTERNAL MEDICINE

## 2025-01-16 PROCEDURE — 96367 TX/PROPH/DG ADDL SEQ IV INF: CPT

## 2025-01-16 RX ORDER — HEPARIN SODIUM (PORCINE) LOCK FLUSH IV SOLN 100 UNIT/ML 100 UNIT/ML
500 SOLUTION INTRAVENOUS AS NEEDED
Status: DISCONTINUED | OUTPATIENT
Start: 2025-01-16 | End: 2025-01-16 | Stop reason: HOSPADM

## 2025-01-16 RX ORDER — SODIUM CHLORIDE 0.9 % (FLUSH) 0.9 %
10 SYRINGE (ML) INJECTION AS NEEDED
OUTPATIENT
Start: 2025-01-16

## 2025-01-16 RX ORDER — HEPARIN SODIUM (PORCINE) LOCK FLUSH IV SOLN 100 UNIT/ML 100 UNIT/ML
500 SOLUTION INTRAVENOUS AS NEEDED
OUTPATIENT
Start: 2025-01-16

## 2025-01-16 RX ORDER — DOXORUBICIN HYDROCHLORIDE 2 MG/ML
130 INJECTION, SOLUTION INTRAVENOUS ONCE
Status: COMPLETED | OUTPATIENT
Start: 2025-01-16 | End: 2025-01-16

## 2025-01-16 RX ORDER — PALONOSETRON 0.05 MG/ML
0.25 INJECTION, SOLUTION INTRAVENOUS ONCE
Status: COMPLETED | OUTPATIENT
Start: 2025-01-16 | End: 2025-01-16

## 2025-01-16 RX ORDER — SODIUM CHLORIDE 0.9 % (FLUSH) 0.9 %
10 SYRINGE (ML) INJECTION AS NEEDED
Status: DISCONTINUED | OUTPATIENT
Start: 2025-01-16 | End: 2025-01-16 | Stop reason: HOSPADM

## 2025-01-16 RX ADMIN — PALONOSETRON 0.25 MG: 0.25 INJECTION, SOLUTION INTRAVENOUS at 14:09

## 2025-01-16 RX ADMIN — PEGFILGRASTIM 6 MG: KIT SUBCUTANEOUS at 16:14

## 2025-01-16 RX ADMIN — HEPARIN 500 UNITS: 100 SYRINGE at 16:15

## 2025-01-16 RX ADMIN — Medication 10 ML: at 16:15

## 2025-01-16 RX ADMIN — DOXORUBICIN HYDROCHLORIDE 130 MG: 2 INJECTION, SOLUTION INTRAVENOUS at 15:15

## 2025-01-16 RX ADMIN — DEXAMETHASONE SODIUM PHOSPHATE 12 MG: 4 INJECTION, SOLUTION INTRA-ARTICULAR; INTRALESIONAL; INTRAMUSCULAR; INTRAVENOUS; SOFT TISSUE at 14:09

## 2025-01-16 RX ADMIN — CYCLOPHOSPHAMIDE 1300 MG: 1 INJECTION INTRAVENOUS at 15:31

## 2025-01-16 RX ADMIN — FOSAPREPITANT 150 MG: 150 INJECTION, POWDER, LYOPHILIZED, FOR SOLUTION INTRAVENOUS at 14:26

## 2025-01-22 ENCOUNTER — INFUSION (OUTPATIENT)
Dept: ONCOLOGY | Facility: HOSPITAL | Age: 75
End: 2025-01-22
Payer: MEDICARE

## 2025-01-22 ENCOUNTER — LAB (OUTPATIENT)
Dept: ONCOLOGY | Facility: CLINIC | Age: 75
End: 2025-01-22
Payer: MEDICARE

## 2025-01-22 ENCOUNTER — OFFICE VISIT (OUTPATIENT)
Dept: ONCOLOGY | Facility: CLINIC | Age: 75
End: 2025-01-22
Payer: MEDICARE

## 2025-01-22 VITALS
HEART RATE: 128 BPM | OXYGEN SATURATION: 98 % | SYSTOLIC BLOOD PRESSURE: 120 MMHG | TEMPERATURE: 96.9 F | WEIGHT: 227.4 LBS | BODY MASS INDEX: 34.58 KG/M2 | RESPIRATION RATE: 18 BRPM | DIASTOLIC BLOOD PRESSURE: 74 MMHG

## 2025-01-22 VITALS
SYSTOLIC BLOOD PRESSURE: 105 MMHG | TEMPERATURE: 97.1 F | DIASTOLIC BLOOD PRESSURE: 64 MMHG | RESPIRATION RATE: 18 BRPM | HEART RATE: 96 BPM | OXYGEN SATURATION: 95 %

## 2025-01-22 DIAGNOSIS — Z51.81 ENCOUNTER FOR MONITORING CARDIOTOXIC DRUG THERAPY: ICD-10-CM

## 2025-01-22 DIAGNOSIS — C50.812 MALIGNANT NEOPLASM OF OVERLAPPING SITES OF LEFT BREAST IN FEMALE, ESTROGEN RECEPTOR POSITIVE: ICD-10-CM

## 2025-01-22 DIAGNOSIS — R19.7 DIARRHEA, UNSPECIFIED TYPE: ICD-10-CM

## 2025-01-22 DIAGNOSIS — L02.212 ABSCESS OF BACK: ICD-10-CM

## 2025-01-22 DIAGNOSIS — Z17.0 MALIGNANT NEOPLASM OF OVERLAPPING SITES OF LEFT BREAST IN FEMALE, ESTROGEN RECEPTOR POSITIVE: Primary | ICD-10-CM

## 2025-01-22 DIAGNOSIS — C50.812 MALIGNANT NEOPLASM OF OVERLAPPING SITES OF LEFT BREAST IN FEMALE, ESTROGEN RECEPTOR POSITIVE: Primary | ICD-10-CM

## 2025-01-22 DIAGNOSIS — Z51.11 ENCOUNTER FOR ANTINEOPLASTIC CHEMOTHERAPY: ICD-10-CM

## 2025-01-22 DIAGNOSIS — Z17.0 MALIGNANT NEOPLASM OF OVERLAPPING SITES OF LEFT BREAST IN FEMALE, ESTROGEN RECEPTOR POSITIVE: ICD-10-CM

## 2025-01-22 DIAGNOSIS — Z79.899 ENCOUNTER FOR MONITORING CARDIOTOXIC DRUG THERAPY: ICD-10-CM

## 2025-01-22 DIAGNOSIS — C50.919 MALIGNANT NEOPLASM OF FEMALE BREAST, UNSPECIFIED ESTROGEN RECEPTOR STATUS, UNSPECIFIED LATERALITY, UNSPECIFIED SITE OF BREAST: Primary | ICD-10-CM

## 2025-01-22 LAB
027 TOXIN: NORMAL
ADV 40+41 DNA STL QL NAA+NON-PROBE: NOT DETECTED
ALBUMIN SERPL-MCNC: 3.9 G/DL (ref 3.5–5.2)
ALBUMIN/GLOB SERPL: 1.5 G/DL
ALP SERPL-CCNC: 152 U/L (ref 39–117)
ALT SERPL W P-5'-P-CCNC: 9 U/L (ref 1–33)
ANION GAP SERPL CALCULATED.3IONS-SCNC: 14.5 MMOL/L (ref 5–15)
AST SERPL-CCNC: 15 U/L (ref 1–32)
ASTRO TYP 1-8 RNA STL QL NAA+NON-PROBE: NOT DETECTED
BILIRUB SERPL-MCNC: 0.6 MG/DL (ref 0–1.2)
BUN SERPL-MCNC: 21 MG/DL (ref 8–23)
BUN/CREAT SERPL: 16.8 (ref 7–25)
C CAYETANENSIS DNA STL QL NAA+NON-PROBE: NOT DETECTED
C COLI+JEJ+UPSA DNA STL QL NAA+NON-PROBE: NOT DETECTED
C DIFF TOX GENS STL QL NAA+PROBE: NEGATIVE
CALCIUM SPEC-SCNC: 8.9 MG/DL (ref 8.6–10.5)
CHLORIDE SERPL-SCNC: 94 MMOL/L (ref 98–107)
CO2 SERPL-SCNC: 28.5 MMOL/L (ref 22–29)
CREAT SERPL-MCNC: 1.25 MG/DL (ref 0.57–1)
CRYPTOSP DNA STL QL NAA+NON-PROBE: NOT DETECTED
DEPRECATED RDW RBC AUTO: 45.8 FL (ref 37–54)
E HISTOLYT DNA STL QL NAA+NON-PROBE: NOT DETECTED
EAEC PAA PLAS AGGR+AATA ST NAA+NON-PRB: NOT DETECTED
EC STX1+STX2 GENES STL QL NAA+NON-PROBE: NOT DETECTED
EGFRCR SERPLBLD CKD-EPI 2021: 45 ML/MIN/1.73
EOSINOPHIL # BLD MANUAL: 0.18 10*3/MM3 (ref 0–0.4)
EOSINOPHIL NFR BLD MANUAL: 8 % (ref 0.3–6.2)
EPEC EAE GENE STL QL NAA+NON-PROBE: NOT DETECTED
ERYTHROCYTE [DISTWIDTH] IN BLOOD BY AUTOMATED COUNT: 13.3 % (ref 12.3–15.4)
ETEC LTA+ST1A+ST1B TOX ST NAA+NON-PROBE: NOT DETECTED
G LAMBLIA DNA STL QL NAA+NON-PROBE: NOT DETECTED
GLOBULIN UR ELPH-MCNC: 2.6 GM/DL
GLUCOSE SERPL-MCNC: 168 MG/DL (ref 65–99)
HCT VFR BLD AUTO: 39 % (ref 34–46.6)
HGB BLD-MCNC: 12.4 G/DL (ref 12–15.9)
LYMPHOCYTES # BLD MANUAL: 0.9 10*3/MM3 (ref 0.7–3.1)
MAGNESIUM SERPL-MCNC: 1.9 MG/DL (ref 1.6–2.4)
MCH RBC QN AUTO: 29.5 PG (ref 26.6–33)
MCHC RBC AUTO-ENTMCNC: 31.8 G/DL (ref 31.5–35.7)
MCV RBC AUTO: 92.6 FL (ref 79–97)
NEUTROPHILS # BLD AUTO: 1.16 10*3/MM3 (ref 1.7–7)
NEUTROPHILS NFR BLD MANUAL: 52 % (ref 42.7–76)
NOROVIRUS GI+II RNA STL QL NAA+NON-PROBE: DETECTED
NRBC SPEC MANUAL: 4 /100 WBC (ref 0–0.2)
P SHIGELLOIDES DNA STL QL NAA+NON-PROBE: NOT DETECTED
PLAT MORPH BLD: NORMAL
PLATELET # BLD AUTO: 182 10*3/MM3 (ref 140–450)
PMV BLD AUTO: 10.3 FL (ref 6–12)
POTASSIUM SERPL-SCNC: 3.1 MMOL/L (ref 3.5–5.2)
PROT SERPL-MCNC: 6.5 G/DL (ref 6–8.5)
RBC # BLD AUTO: 4.21 10*6/MM3 (ref 3.77–5.28)
RBC MORPH BLD: NORMAL
RVA RNA STL QL NAA+NON-PROBE: NOT DETECTED
S ENT+BONG DNA STL QL NAA+NON-PROBE: NOT DETECTED
SAPO I+II+IV+V RNA STL QL NAA+NON-PROBE: NOT DETECTED
SCAN SLIDE: NORMAL
SHIGELLA SP+EIEC IPAH ST NAA+NON-PROBE: NOT DETECTED
SODIUM SERPL-SCNC: 137 MMOL/L (ref 136–145)
V CHOL+PARA+VUL DNA STL QL NAA+NON-PROBE: NOT DETECTED
V CHOLERAE DNA STL QL NAA+NON-PROBE: NOT DETECTED
VARIANT LYMPHS NFR BLD MANUAL: 40 % (ref 19.6–45.3)
WBC NRBC COR # BLD AUTO: 2.24 10*3/MM3 (ref 3.4–10.8)
Y ENTEROCOL DNA STL QL NAA+NON-PROBE: NOT DETECTED

## 2025-01-22 PROCEDURE — 87177 OVA AND PARASITES SMEARS: CPT

## 2025-01-22 PROCEDURE — 25810000003 SODIUM CHLORIDE 0.9 % SOLUTION

## 2025-01-22 PROCEDURE — 87427 SHIGA-LIKE TOXIN AG IA: CPT

## 2025-01-22 PROCEDURE — 96365 THER/PROPH/DIAG IV INF INIT: CPT

## 2025-01-22 PROCEDURE — 87507 IADNA-DNA/RNA PROBE TQ 12-25: CPT

## 2025-01-22 PROCEDURE — 25010000002 HEPARIN LOCK FLUSH PER 10 UNITS

## 2025-01-22 PROCEDURE — 87045 FECES CULTURE AEROBIC BACT: CPT

## 2025-01-22 PROCEDURE — 25010000002 POTASSIUM CHLORIDE 10 MEQ/100ML SOLUTION

## 2025-01-22 PROCEDURE — 87493 C DIFF AMPLIFIED PROBE: CPT

## 2025-01-22 PROCEDURE — 87209 SMEAR COMPLEX STAIN: CPT

## 2025-01-22 PROCEDURE — 83735 ASSAY OF MAGNESIUM: CPT | Performed by: INTERNAL MEDICINE

## 2025-01-22 PROCEDURE — 85025 COMPLETE CBC W/AUTO DIFF WBC: CPT | Performed by: INTERNAL MEDICINE

## 2025-01-22 PROCEDURE — 96366 THER/PROPH/DIAG IV INF ADDON: CPT

## 2025-01-22 PROCEDURE — 85007 BL SMEAR W/DIFF WBC COUNT: CPT | Performed by: INTERNAL MEDICINE

## 2025-01-22 PROCEDURE — 87046 STOOL CULTR AEROBIC BACT EA: CPT

## 2025-01-22 PROCEDURE — 80053 COMPREHEN METABOLIC PANEL: CPT | Performed by: INTERNAL MEDICINE

## 2025-01-22 RX ORDER — POTASSIUM CHLORIDE 7.45 MG/ML
10 INJECTION INTRAVENOUS
Status: COMPLETED | OUTPATIENT
Start: 2025-01-22 | End: 2025-01-22

## 2025-01-22 RX ORDER — POTASSIUM CHLORIDE 1.5 G/1.58G
20 POWDER, FOR SOLUTION ORAL 2 TIMES DAILY
Qty: 4 PACKET | Refills: 0 | Status: SHIPPED | OUTPATIENT
Start: 2025-01-22 | End: 2025-01-24

## 2025-01-22 RX ORDER — SODIUM CHLORIDE 0.9 % (FLUSH) 0.9 %
10 SYRINGE (ML) INJECTION AS NEEDED
Status: DISCONTINUED | OUTPATIENT
Start: 2025-01-22 | End: 2025-01-22 | Stop reason: HOSPADM

## 2025-01-22 RX ORDER — SODIUM CHLORIDE 0.9 % (FLUSH) 0.9 %
10 SYRINGE (ML) INJECTION AS NEEDED
OUTPATIENT
Start: 2025-01-22

## 2025-01-22 RX ORDER — HEPARIN SODIUM (PORCINE) LOCK FLUSH IV SOLN 100 UNIT/ML 100 UNIT/ML
500 SOLUTION INTRAVENOUS AS NEEDED
OUTPATIENT
Start: 2025-01-22

## 2025-01-22 RX ORDER — HEPARIN SODIUM (PORCINE) LOCK FLUSH IV SOLN 100 UNIT/ML 100 UNIT/ML
500 SOLUTION INTRAVENOUS AS NEEDED
Status: DISCONTINUED | OUTPATIENT
Start: 2025-01-22 | End: 2025-01-22 | Stop reason: HOSPADM

## 2025-01-22 RX ADMIN — POTASSIUM CHLORIDE 10 MEQ: 7.46 INJECTION, SOLUTION INTRAVENOUS at 13:26

## 2025-01-22 RX ADMIN — POTASSIUM CHLORIDE 10 MEQ: 7.46 INJECTION, SOLUTION INTRAVENOUS at 11:20

## 2025-01-22 RX ADMIN — HEPARIN 500 UNITS: 100 SYRINGE at 16:35

## 2025-01-22 RX ADMIN — POTASSIUM CHLORIDE 10 MEQ: 7.46 INJECTION, SOLUTION INTRAVENOUS at 15:07

## 2025-01-22 RX ADMIN — POTASSIUM CHLORIDE 10 MEQ: 7.46 INJECTION, SOLUTION INTRAVENOUS at 12:20

## 2025-01-22 RX ADMIN — Medication 10 ML: at 16:35

## 2025-01-22 RX ADMIN — SODIUM CHLORIDE 1000 ML: 9 INJECTION, SOLUTION INTRAVENOUS at 11:20

## 2025-01-22 NOTE — PROGRESS NOTES
Venipuncture Blood Specimen Collection  Venipuncture performed in left arm by Kailyn Sparrow MA with good hemostasis. Patient tolerated the procedure well without complications.   01/22/25   Kailyn Sparrow MA

## 2025-01-22 NOTE — PROGRESS NOTES
"NAME: Kristyn Goff    : 1950    DATE: 2025    DIAGNOSIS: Malignant neoplasm of upper-outer quadrant of left breast in female, estrogen receptor positive     TREATMENT HISTORY:   Left mastectomy and SLNBx  with Prophylactic Contralateral Mastectomy performed by Dr. Winnie He 24    2.        CHIEF COMPLAINT:  Breast Cancer    HISTORY OF PRESENT ILLNESS:   Kristyn Goff is a very pleasant 74 y.o. female who is being seen today in the presence of her son and daughter-in-law at the request of Maria Esther He MD for evaluation and treatment of breast cancer.  Ms. Goff says she first noticed a \"knot\" in her left breast about 15 years prior around .  Then it was maybe the size of a marble.  She says it enlarged over time until it was about the size of a lime.  She was seeing her PEP, JOHN Toledo who noticed the lump during an exam and ordered further evaluation.  She had biopsy of two areas in the L breast as well as L axillary LN and this showed invasive moderately differentiated ductal carcinoma with micropapillary features Tumor was >95% 3+ ER+, 81-90% 2+ CA+, HER-2/Estelita - (0).  L axillary LN was involved.  Dr. He took her for L mastectomy w/ SLNBx and prophylactic contralateral mastectomy as below on 24.  Patholgy showed a Stage IIIA multifocal invasive ductal carcinoma of the left breast with micropapillary features. Tumors were 30 mm, 4 mm.  There was associated high grade DCIS.  4/5 LN were involved (3/4 SLN and 1/1 other LN), the largest being 22 mm with extranodal extension.  R breast was without malignancy.    Ms. Goff is recovering reasonably well.  She has healed somewhat slowly and still has L axillary drain in place but she is making steady progress and now says she has minimal output from the axillary drain.  She is to see Dr. He on Wednesday.   She denies weight loss.  No fevers or chills. No chest pain or shortness of breath. No abdominal " "pain, n/v/d/c, no rectal bleeding.  No bony pain. No headaches or visual disturbance.  They do complain that she has developed a \"place\" on her back they are concerned about that they want me to look at today.    Of note, prior to her surgery she had CT imaging as below which showed several small, nonspecific lung nodules in the R lung (5-6 mm).  Of note similar finding was noted on CTPE protocol from 23).  MRI of the brain also showed a 3 mm enhancing lesion in the L frontal bone which could represent hemangioma although metastatic lesion could not be completely excluded.  Bone scan was clear.        INTERVAL HISTORY:  Kristyn Goff is here today with her son for f/u of breast cancer.  She has been doing well since her last visit with us.  She does state today that she has been suffering with diarrhea since yesterday, accompanied by upset stomach after eating or drinking.  She states that she started taking Imodium only this morning, and hasn't took enough to know whether this is helping or not.  She had 3-5 episodes of diarrhea yesterday and has already had 2 episodes this morning.  She reports that prior to this diarrhea starting she was constipated.  Denies any fevers or chills.  She states she has not been drinking or eating like normal because it causes diarrhea and upset stomach.  Other than this diarrhea she states that she has been tolerating treatment well.  No nausea or vomiting.  Otherwise no other new or specific complaints today.      PAST MEDICAL HISTORY:  Past Medical History:   Diagnosis Date    Breast cancer     Elevated cholesterol     GERD (gastroesophageal reflux disease)     High cholesterol    -  H/o Diverticulosis  -  H/o Hyperparathyroidism    Risk Factors:  Age of Menarche: 9 yo  Age of Menopause: around age 54  Prior Breast Disease: Denies prior bx but says this lesion was present for maybe 15 yrs before she sought attention.  Pregnancies:    Age of 1st pregnancy:23  Family " History of Breast Cancer: denies  Family History of Ovarian Cancer:Denies  History of HRT use:  Denies       PAST SURGICAL HISTORY:  Past Surgical History:   Procedure Laterality Date    CATARACT EXTRACTION Right     COLONOSCOPY      GALLBLADDER SURGERY      MASTECTOMY Bilateral     PARATHYROIDECTOMY      VENOUS ACCESS DEVICE (PORT) INSERTION Right 1/13/2025    Procedure: INSERTION VENOUS ACCESS DEVICE;  Surgeon: Karen Johnston MD;  Location: [facility];  Service: General;  Laterality: Right;   -  Parathyrodectomy  -  S/p mario cataract surgery w/ implant placement  -  CCU    FAMILY HISTORY:  Family History   Problem Relation Age of Onset    Heart attack Father     Hypertension Sister     Heart attack Sister     Diabetes Sister    -  Denies family h/o malignancy of any kind    SOCIAL HISTORY:  Social History     Socioeconomic History    Marital status:    Tobacco Use    Smoking status: Former     Types: Cigarettes    Smokeless tobacco: Never   Vaping Use    Vaping status: Never Used   Substance and Sexual Activity    Alcohol use: Never    Drug use: Never    Sexual activity: Defer   -  .  Lives alone.  Used to own a flower shop but retired around 2012.  Former smoker, quit around age 35 yo      REVIEW OF SYSTEMS:   A comprehensive 14 point review of systems was performed.  Significant findings as mentioned above.  All other systems reviewed and are negative.      MEDICATIONS:  The current medication list was reviewed in the EMR    Current Outpatient Medications:     atorvastatin (LIPITOR) 20 MG tablet, Take 1 tablet by mouth Daily., Disp: , Rfl:     Carboxymethylcellulose Sodium (EYE DROPS OP), Apply 1 drop to eye(s) as directed by provider 2 (Two) Times a Day., Disp: , Rfl:     hydroCHLOROthiazide 25 MG tablet, Take 1 tablet by mouth Daily., Disp: , Rfl:     ibuprofen (ADVIL,MOTRIN) 600 MG tablet, Take 1 tablet by mouth Every 8 (Eight) Hours As Needed for Mild Pain. Please take on day following  chemotherapy prior to growth factor injection., Disp: 30 tablet, Rfl: 0    lidocaine-prilocaine (EMLA) 2.5-2.5 % cream, Apply to port-a-cath site 30 minutes prior to arrival at infusion center. Cover with plastic wrap., Disp: 30 g, Rfl: 1    loratadine (CLARITIN) 10 MG tablet, Take 1 tablet by mouth daily on the day following chemotherapy prior to growth factor injection and continue for 7 days., Disp: 7 tablet, Rfl: 5    OLANZapine (zyPREXA) 5 MG tablet, Take 1 tablet by mouth for 4 doses starting night of chemotherapy., Disp: 8 tablet, Rfl: 1    ondansetron (ZOFRAN) 8 MG tablet, Take 1 tablet by mouth 3 (Three) Times a Day As Needed for Nausea or Vomiting., Disp: 30 tablet, Rfl: 5    prochlorperazine (COMPAZINE) 10 MG tablet, Take 1 tablet by mouth Every 6 (Six) Hours As Needed for Nausea or Vomiting., Disp: 30 tablet, Rfl: 1    vitamin D (ERGOCALCIFEROL) 1.25 MG (14285 UT) capsule capsule, Take 1 capsule by mouth 1 (One) Time Per Week., Disp: 4 capsule, Rfl: 5    amoxicillin-clavulanate (AUGMENTIN) 875-125 MG per tablet, Take 1 tablet by mouth 2 (Two) Times a Day for 5 days., Disp: 10 tablet, Rfl: 0    potassium chloride (Klor-Con) 20 MEQ packet, Take 20 mEq by mouth 2 (Two) Times a Day for 2 days., Disp: 4 packet, Rfl: 0  No current facility-administered medications for this visit.    Facility-Administered Medications Ordered in Other Visits:     heparin injection 500 Units, 500 Units, Intravenous, PRN, Karen Burgosn, APRN    potassium chloride 10 mEq in 100 mL IVPB, 10 mEq, Intravenous, Q1H, Karen Burgos, APRKECIA, Last Rate: 100 mL/hr at 01/22/25 1220, 10 mEq at 01/22/25 1220    sodium chloride 0.9 % flush 10 mL, 10 mL, Intravenous, PRN, Karen Burgosn, APRN    ALLERGIES:  No Known Allergies    PHYSICAL EXAM:  Vitals:    01/22/25 0929   BP: 120/74   Pulse: (!) 128   Resp: 18   Temp: 96.9 °F (36.1 °C)   TempSrc: Temporal   SpO2: 98%   Weight: 103 kg (227 lb 6.4 oz)   PainSc: 0-No pain        Body surface area is 2.16 meters squared.   Body mass index is 34.58 kg/m².   ECOG score: 0     General:  Awake, alert and oriented, in no distress  HEENT:  Pupils are equal, round and reactive to light and accommodation, Extra-ocular movements full, Oropharyx clear, mucous membranes moist  Neck:  No JVD, thyromegaly or lymphadenopathy  CV:  Regular rate and rhythm, no murmurs, rubs or gallops  Resp:  Lungs are clear to auscultation bilaterally, no wheezing  Breast:  S/p mario mastectomies.  Surgical incisions are well-healed.    No palpable nodularity or masses.  All drains have been removed and drain sites are well-healed.  No axillary LAD on either side.  Abd:  Soft, non-tender, non-distended, bowel sounds present, no palpable organomegaly or masses  Ext:  No clubbing, cyanosis, no lower extremity edema  Back:  Over the lower L back near the waistline, she previously had an area of cellulitis maybe 8x2.5 cm with induration / thickening and erythema of the skin.  There was some element of abscess formation with purulent drainage.  This is now completely resolved without erythema warmth or fluctuance.  Lymph:  No cervical, supraclavicular, axillary, inguinal or femoral adenopathy  Neuro:  MS as above, CN II-XII intact, grossly non-focal exam      PATHOLOGY:  8/28/24 11/4/24            ENDOSCOPY:        IMAGING:  Mammo Diagnostic Digital Tomosynthesis Bilateral With CAD (07/19/2024 10:42) & US Breast Left Limited (07/19/2024 11:35)   FINDINGS:    Irregular hypodense mass upper outer left breast, anterior depth, 1-2 o'clock radian, with pleomorphic calcifications, measures 3.9 x 2.4 cm (image 40 MLO, image 37 CC).      Irregular lesion with coarse calcification is seen at left 2-3 o'clock radian, posterior depth measuring 1.7 x 1.5 cm (image 26 MLO, image 23 CC).      Enlarged left axillary lymph nodes are seen.      Benign secretory calcifications are seen bilaterally.      No additional suspicious  masses, tissue distortion, or suspicious calcifications are identified.      Ultrasound:      Real time, targeted, technologist performed sonographic imaging of the left breast was performed utilizing a multihertz transducer.       Ultrasound was performed in the left upper outer quadrant and axilla.      --- Lobulated mass is seen at left 11-2 o'clock radian, 4 cm from the nipple, central vascular flow, 5.0 x 2.9 x 3.4 cm. This corresponds to the mammogram.    --- Spiculated lesion at 2-3 o'clock radian, 9 cm from the nipple, measuring 1.7 x 1.0 x 1.1 cm. This corresponds to the distortion seen on mammogram.      Enlarging nodes are seen in the left axilla. The largest node measures 2.0 x 2.5 x 1.3 cm.     IMPRESSION:  1.   No mammographic evidence of malignancy is are seen in the right breast.        2.   Breast masses at left 11-3 o'clock radian. These are highly suspicious for malignant process.    3. Enlarging nodes in the left axilla which are suspicious for metastatic disease.         RECOMMENDED FOLLOWUP: Ultrasound guided biopsy of the large mass at 11-2 o'clock radian, left 2-3 o'clock radian and one of the left axillary lymph nodes.      BI-RADS category 5: Highly suggestive of malignancy.       MRI Abdomen With & Without Contrast (08/14/2024 14:05)   FINDINGS:      LOWER CHEST: Lung bases are clear. Small hiatal hernia.      LIVER: Normal contours. No mass. Vascular shunt in the dome (for example on bolus phase series, image 119). No steatosis. No intrahepatic biliary dilatation. Portal and hepatic veins are patent.      GALLBLADDER: Removed.      COMMON BILE DUCT: Normal caliber. No stones.       SPLEEN: Within normal limits.      PANCREAS: No mass. No pancreatic fluid collections. The pancreatic duct is normal.      ADRENALS: No masses.      KIDNEYS: No solid left renal lesion identified, to correspond to the abnormality described on recent ultrasound. A parapelvic cyst in the left kidney measuring 2  cm. A couple of small cortical cysts in the right kidney measuring up to 1.3 cm. No hydronephrosis.      LYMPH NODES: No adenopathy.      STOMACH, SMALL BOWEL AND COLON: No bowel wall thickening or obstruction.      PERITONEAL CAVITY: No mesenteric stranding or free fluid.      ABDOMINAL AORTA: No aneurysm.      SOFT TISSUES: Normal.      OSSEOUS STRUCTURES: No acute fracture or destructive lesion.     IMPRESSION:  1.   No solid left renal lesion identified, to correspond to the abnormality described on recent ultrasound. Few bilateral renal cysts, including a parapelvic cyst in the left kidney measuring 2 cm.   2.   Additional noncontributory findings described above.     Mammo Diagnostic Digital Tomosynthesis Left With CAD (08/28/2024 09:46) & US Breast Left Limited (08/28/2024 11:11)   FINDINGS:   Findings: There are multiple suspicious findings in the left breast as listed below:     Finding 1: There is a irregular mass with associated calcifications abutting the skin, measuring mammographically 45 x 42 x 30 mm. Targeted left breast ultrasound at the 1:00 position, 4 cm from the revealed a corresponding sonographic irregular mass with calcifications measuring sonographically 4.6 x 2.7 x 3 cm. This extends to the skin. Finding is hard Elastography. Finding is highly suspicious.     Finding 2: There is a irregular mass is associated architectural distortion and coarse central calcification as well as fine: calcifications in the approximate 2:00 position of the left breast middle to posterior depth, 13 cm from the nipple. Targeted left breast ultrasound the 2:00 position 13 cm from the reveals an irregular 8 x 6 x 6 mm mass, with adjacent vascularity, intermediate Elastography.   Finding is highly suspicious.     Finding 3: In the 12:00 position of the left breast there is a 45 mm span of fine pleomorphic calcifications with linear/segmental distribution with associated subtle asymmetry best appreciated in the  magnified views, full field CC, slice 40 of 79 as well as ML slice 47/91. These are located approximately 8 cm from nipple. Targeted right breast ultrasound in the left breast at the 12:00 position 8 cm from the nipple identifies an irregular mass, soft on Elastography, measuring 14 x 11 x 12 mm, which appears underestimated sonographically compared to mammographic span of 45 mm linear calcifications. Finding is highly suspicious     Finding 4: There are additional small highly suspicious asymmetries with microcalcifications scattered throughout the inferior left breast and lower inner quadrant in the approximate 6:00-9:00 position middle to posterior depth: The total span of these lower inner quadrant asymmetries measures up to 10 x 7 x 6 cm as best appreciated on CC full-field, slice 13 of 79 and ML slice 27 out of 92. Findings are highly suspicious indicating multicentric disease.     Finding 5: There are  2 abnormal lymph nodes in the left axilla corresponding to completely replaced lymph nodes labeled as lymph node1 and 2 on US; these are highly suspicious with no demonstration of fatty hilum. This lymph nodes measure respectively 22 mm and 18 mm each, adjacent to each other.       Right breast: A repeat right diagnostic mammogram was not performed as review of outside right breast mammogram did not reveal any suspicious areas of concern.     IMPRESSION:  BI-RADS Code: BI-RADS 5, Highly suggestive of malignancy.   Multicentric left breast disease with highly suspicious at least 2 left axillary lymph nodes     RECOMMENDATIONS:   Left Breast Recommendations: Ultrasound Guided Breast Biopsy for findings 1 and 2 to confirm multicentric disease.     Fine Needle Aspiration     Right diagnostic mammogram in one year.     US Axilla Left (09/12/2024 12:02)   FINDINGS:   Final mammographic images demonstrate the localizer tag to be located within the left axillary lymph node and adjacent to the twirl clip.     Left  Breast Density: The breast tissue is heterogeneously dense, which may obscure small masses.     IMPRESSION:  Successful localization of the left axillary lymph node with a pink smartclip.     MR Head w and wo IV Contrast (09/25/2024 08:32)   Findings:     No enhancing lesions to suggest intracranial metastatic disease.     No evidence of restricted diffusion to suggest acute territorial infarct.     No evidence of mass effect, midline shift, ration, hydrocephalus.     No acute appearing intracranial hemorrhage.  No extra-axial fluid collections.  Scattered periventricular and subcortical T2/FLAIR hyperintense foci within the supratentorial brain, probably related to small vessel ischemic changes.  Age-appropriate ventricular size and configuration.     Basal cisterns are patent.  Major intracranial flow voids are preserved.     Paranasal sinuses  are clear.  Mastoid cells are clear.  Postsurgical changes of the orbits.     3 mm enhancing lesion within the left frontal bone could be related to hemangioma (series 6 image 20, series 13 image 20), although underlying metastatic disease cannot be totally excluded.  Attention to this area on follow-up imaging.     No additional calvarial lesions.     Impression:  No abnormal intraparenchymal postcontrast enhancement to suggest intraparenchymal metastatic disease.      No acute intracranial process.  Sequelae of small vessel ischemic changes.     3 mm enhancing lesion within the left frontal bone could be related to hemangioma (series 6 image 20, series 13 image 20), although underlying metastatic disease cannot be totally excluded.  Attention to this area on follow-up imaging.     CT Abdomen Pelvis With Contrast (09/25/2024 12:40)   FINDINGS:   Mediastinum and Pleura: No mediastinal or hilar adenopathy. No pleural or pericardial effusion.     Lungs: 5 mm peripheral right upper lobe nodule, image 49 of series 3. Subpleural 6 mm peripheral right middle lobe nodule, image 58  series 3. Subpleural 6 mm right basal nodule, image 73 of series 3. No acute airspace disease. The central airways are unremarkable.     Abdomen and Pelvis: No focal hepatic lesions. Gallbladder is removed. No biliary dilatation. No splenic lesions. Normal adrenal glands. No pancreatic lesions or peripancreatic stranding. Exophytic right renal cortical cysts. No suspicious renal parenchymal lesions. No renal collecting system dilatation.     Calcifications involving abdominal aorta, bilateral iliac vessels and SMA, with associated moderate to significant stenosis of the distal SMA trunk.     No enlarged lymph nodes. No mesenteric or retroperitoneal nodularity. No free fluid or air within the abdomen or pelvis.     Unremarkable stomach. Normal caliber of small and large bowel. Distal colonic diverticulosis without evidence of complications.     Urinary bladder is within normal limits. No abnormal soft tissue densities in the regions of adnexa.     Musculoskeletal: Right breast is only partially included, without discrete suspicious lesions noted. There is a biopsied lesion within left breast, image 34 of series 2, measures up to 2.1 cm. There is also a left axillary lymphadenopathy, status post biopsy, with example of left axillary lymph node on image 37 of series 2 measuring up to 1.7 cm.     No aggressive osseous lesions or acute fractures.     IMPRESSION:  Left breast lesion and left axillary lymphadenopathy, representing known breast cancer with metastasis.   Several peripheral right lung nodules, nonspecific.   No enlarged mediastinal lymph nodes.   No suspicious lesions within the abdomen or pelvis.   No suspicious osseous lesions are identified.     CT Chest With Contrast Diagnostic (09/25/2024 12:40)   FINDINGS:   Mediastinum and Pleura: No mediastinal or hilar adenopathy. No pleural or pericardial effusion.     Lungs: 5 mm peripheral right upper lobe nodule, image 49 of series 3. Subpleural 6 mm peripheral  right middle lobe nodule, image 58 series 3. Subpleural 6 mm right basal nodule, image 73 of series 3. No acute airspace disease. The central airways are unremarkable.     Abdomen and Pelvis: No focal hepatic lesions. Gallbladder is removed. No biliary dilatation. No splenic lesions. Normal adrenal glands. No pancreatic lesions or peripancreatic stranding. Exophytic right renal cortical cysts. No suspicious renal parenchymal lesions. No renal collecting system dilatation.     Calcifications involving abdominal aorta, bilateral iliac vessels and SMA, with associated moderate to significant stenosis of the distal SMA trunk.     No enlarged lymph nodes. No mesenteric or retroperitoneal nodularity. No free fluid or air within the abdomen or pelvis.     Unremarkable stomach. Normal caliber of small and large bowel. Distal colonic diverticulosis without evidence of complications.     Urinary bladder is within normal limits. No abnormal soft tissue densities in the regions of adnexa.     Musculoskeletal: Right breast is only partially included, without discrete suspicious lesions noted. There is a biopsied lesion within left breast, image 34 of series 2, measures up to 2.1 cm. There is also a left axillary lymphadenopathy, status post biopsy, with example of left axillary lymph node on image 37 of series 2 measuring up to 1.7 cm.     No aggressive osseous lesions or acute fractures.     IMPRESSION:  Left breast lesion and left axillary lymphadenopathy, representing known breast cancer with metastasis.   Several peripheral right lung nodules, nonspecific.   No enlarged mediastinal lymph nodes.   No suspicious lesions within the abdomen or pelvis.   No suspicious osseous lesions are identified.     NM Bone Scan Whole Body (09/25/2024 13:41)   FINDINGS:   Bones: No sites of focal increased (hot) and decreased (cold) uptake in axial and appendicular skeleton to suggest bone metastasis.     Bones/Joints: Sites of mild uptake  consistent with benign arthritic, degenerative or post traumatic changes.     Soft-tissues: Asymmetric mild uptake in the left breast soft tissues relative to the right. Two kidneys visualized.     IMPRESSION:  No evidence of osseous metastatic disease.     Adult Transthoracic Echo Complete W/ Cont if Necessary Per Protocol (01/03/2025 09:52)   Interpretation Summary    Left ventricular systolic function is normal. Estimated left ventricular EF = 55%    Left ventricular wall thickness is consistent with moderate concentric hypertrophy.    Left ventricular diastolic function is consistent with (grade I) impaired relaxation.    Estimated right ventricular systolic pressure from tricuspid regurgitation is normal (<35 mmHg).    RECENT LABS:  Lab Results   Component Value Date    WBC 2.24 (L) 01/22/2025    HGB 12.4 01/22/2025    HCT 39.0 01/22/2025    MCV 92.6 01/22/2025    RDW 13.3 01/22/2025     01/22/2025    NEUTRORELPCT 69.7 01/15/2025    LYMPHORELPCT 19.7 01/15/2025    MONORELPCT 7.1 01/15/2025    EOSRELPCT 2.6 01/15/2025    BASORELPCT 0.5 01/15/2025    NEUTROABS 1.16 (L) 01/22/2025    LYMPHSABS 1.59 01/15/2025       Lab Results   Component Value Date     01/22/2025    K 3.1 (L) 01/22/2025    CO2 28.5 01/22/2025    CL 94 (L) 01/22/2025    BUN 21 01/22/2025    CREATININE 1.25 (H) 01/22/2025    GLUCOSE 168 (H) 01/22/2025    CALCIUM 8.9 01/22/2025    ALKPHOS 152 (H) 01/22/2025    AST 15 01/22/2025    ALT 9 01/22/2025    BILITOT 0.6 01/22/2025    ALBUMIN 3.9 01/22/2025    PROTEINTOT 6.5 01/22/2025    MG 1.9 01/22/2025     Lab Results   Component Value Date    FERRITIN 87.37 11/25/2024    IRON 40 11/25/2024    TIBC 459 11/25/2024    LABIRON 9 (L) 11/25/2024    FQNTECSQ99 335 11/25/2024    FOLATE 7.32 11/25/2024     Lab Results   Component Value Date    TSH 2.790 11/25/2024     Lab Results   Component Value Date    IUQO03YR 26.3 (L) 11/25/2024     Lab Results   Component Value Date    LABCA2 44.6 (H)  11/25/2024      Lab Results   Component Value Date     37.7 (H) 11/25/2024       ASSESSMENT & PLAN:  Kristyn Goff is a very pleasant 74 y.o. female with Stage IIIA (pT2(m)N2aMX moderately differentiated, multifocal invasive ductal carcinoma with micropapillary features.  Tumors were 30 mm and 4 mm in diameter.  There was associated high grade DCIS.  Surgical margins were clear.  She had involvement of 3/4 SLN and 1/1 other LN.  Largest LN metastasis was 22 mm with extranodal extension.  Tumor was >95% 3+ ER+, 81-90% 2+ ND+, HER-2/Estelita - (0).    1.  Left Breast Cancer:  -  Patient and family report that the mass was present for about 15 years before she had it evaluated.  -  She is s/p successful L mastectomy w/ clear margins and prophylactic contralateral mastectomy performed by Dr. Winnie He 11/4/24.  -She is now well-healed from her surgery.  - Baseline echocardiogram performed 1/3/2025 showed low normal LVEF 55%.  -  Dr. Tellez has recommended adjuvant chemotherapy to consist of DDAC x 4 with growth factor support followed by weekly Taxol x 12. We discussed potential risks benefits and side effects and she would like to proceed.    -Proceeded with dose dense AC chemotherapy, she received her first cycle on 1/16/2025.  He tolerated well with no nausea, vomiting.  She does complain of diarrhea today that started yesterday (see issue #4).  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  -  Following adjuvant radiation she will require hormonal therapy.  Will plan to get DEXA closer to that time.  - She will see MD with cycle 2 of chemotherapy (1/31/2025) with treatment labs.    2.  Abscess L back:  -Resolved    3.  Abnormal imaging findings:  -  She had CT CAP performed 9/25/24 which showed several tiny, nonspecific lung nodules in the R lung measuring 5-6 mm.  These were previously described on CTPE imaging 4/30/23.  Will need f/u CT imaging after an interval.  -   She also had 3 mm enhancing lesion in the L frontal bone noted on Brain MRI 9/25/24.  Could be c/w hemangioma.  Could not completely r/o metastatic lesion.  Bone scan 9/25/24 was negative.     -  Will plan to reimage after an interval.    4.  Diarrhea  -Reports diarrhea that started yesterday, accompanied by upset stomach after eating or drinking.  Reports 3-5 episodes yesterday and 2 episodes this morning.  Started taking Imodium this morning, but has not took enough to know whether it is helping or not.  -Denies fevers or chills.  -She has not been eating or drinking like normal because of the symptoms.  -Heart rate elevated today (128), most likely due to dehydration from current diarrhea.  This came down to 96 bpm once she was downstairs for her fluids.  -Will give 1 L IV fluids for supportive care today.  She can continue taking Imodium at home, but if this does not relieve her symptoms she knows to call and let us know.   -Potassium 3.1 today.  Ordered K+ 10 mEq IV x 4 runs to be given in infusion center today.  Ordered potassium packets, 20 mEq twice daily for the next 2 days.  -Ordered stool studies to be done (stool culture, O&P exam, C. difficile, GI panel).  -WBC is also slightly low at 2.24.  Will preventatively treat with Augmentin 875/125 BID x 5 days.      5.  Prophylaxis:  -  Kristyn JONES Samples did not receive 2024 flu vaccine.  This was recommended but declined.  -  Kristyn L Samples did not receive Prevnar vaccine.  This was recommended but declined.  -  Kristyn L Samples did not receive COVID vaccine.  This was recommended but declined.      6. ACO / DANK/Other  Quality measures  -  Kristyn JONES Samples did not receive 2024 flu vaccine.  This was recommended but declined.  -  Kristyn JONES Samples reports a pain score of 0.    -  Current outpatient and discharge medications have been reconciled for the patient.  Reviewed by: Karen Burgos, JOHN      7.  F/u:  -1 L IV fluids for supportive care  today  -Continue Imodium at home  -Potassium IV 10 mEq x 4 runs in infusion center today  -Potassium packets 20 mEq twice daily x 2 days Rx given today.  -MD will see her back as planned on 1/31/2025 with cycle 2 with CBC and CMP        I spent 30 minutes with Kristyn Goff today.  In the office today, more than 50% of this time was spent face-to-face with her  in counseling / coordination of care, reviewing her medical history and counseling on the current treatment plan.  All questions were answered to her satisfaction      Electronically Signed by: JOHN Rebolledo      CC:     JOHN Toledo DO Veronica Morgan Jones, MD

## 2025-01-23 LAB
O+P SPEC MICRO: NORMAL
O+P STL CONC: NORMAL

## 2025-01-26 LAB
BACTERIA SPEC CULT: NORMAL
BACTERIA SPEC CULT: NORMAL
CAMPYLOBACTER STL CULT: NORMAL
E COLI SXT STL QL IA: NEGATIVE
SALM + SHIG STL CULT: NORMAL

## 2025-01-27 NOTE — PROGRESS NOTES
"NAME: Kristyn Goff    : 1950    DATE: 2025    DIAGNOSIS: Malignant neoplasm of upper-outer quadrant of left breast in female, estrogen receptor positive     TREATMENT HISTORY:   Left mastectomy and SLNBx  with Prophylactic Contralateral Mastectomy performed by Dr. Winnie He 24    2.            CHIEF COMPLAINT:  Breast Cancer    HISTORY OF PRESENT ILLNESS:   Kristyn Goff is a very pleasant 75 y.o. female who is being seen today in the presence of her son and daughter-in-law at the request of Maria Esther He MD for evaluation and treatment of breast cancer.  Ms. Goff says she first noticed a \"knot\" in her left breast about 15 years prior around .  Then it was maybe the size of a marble.  She says it enlarged over time until it was about the size of a lime.  She was seeing her PEP, JOHN Toledo who noticed the lump during an exam and ordered further evaluation.  She had biopsy of two areas in the L breast as well as L axillary LN and this showed invasive moderately differentiated ductal carcinoma with micropapillary features Tumor was >95% 3+ ER+, 81-90% 2+ SC+, HER-2/Estelita - (0).  L axillary LN was involved.  Dr. He took her for L mastectomy w/ SLNBx and prophylactic contralateral mastectomy as below on 24.  Patholgy showed a Stage IIIA multifocal invasive ductal carcinoma of the left breast with micropapillary features. Tumors were 30 mm, 4 mm.  There was associated high grade DCIS.  4/5 LN were involved (3/4 SLN and 1/1 other LN), the largest being 22 mm with extranodal extension.  R breast was without malignancy.    Ms. Goff is recovering reasonably well.  She has healed somewhat slowly and still has L axillary drain in place but she is making steady progress and now says she has minimal output from the axillary drain.  She is to see Dr. He on Wednesday.   She denies weight loss.  No fevers or chills. No chest pain or shortness of breath. No abdominal " "pain, n/v/d/c, no rectal bleeding.  No bony pain. No headaches or visual disturbance.  They do complain that she has developed a \"place\" on her back they are concerned about that they want me to look at today.    Of note, prior to her surgery she had CT imaging as below which showed several small, nonspecific lung nodules in the R lung (5-6 mm).  Of note similar finding was noted on CTPE protocol from 23).  MRI of the brain also showed a 3 mm enhancing lesion in the L frontal bone which could represent hemangioma although metastatic lesion could not be completely excluded.  Bone scan was clear.        INTERVAL HISTORY:  Kristyn Goff is here today for f/u of breast cancer.  Since her last visit, she took her first cycle of AC chemotherapy.  Unfortunately shortly thereafter she came down with a norovirus infection which caused a lot of diarrhea.  Thankfully she is now well recovered from this.  She said she did not have any difficulty with the treatment.  She denied any nausea or vomiting even with a norovirus infection.  Diarrhea is completely resolved.  She is feeling well and ready for her second course of chemotherapy.  She has no concerns or complaints today.        PAST MEDICAL HISTORY:  Past Medical History:   Diagnosis Date    Breast cancer     Elevated cholesterol     GERD (gastroesophageal reflux disease)     High cholesterol    -  H/o Diverticulosis  -  H/o Hyperparathyroidism    Risk Factors:  Age of Menarche: 7 yo  Age of Menopause: around age 54  Prior Breast Disease: Denies prior bx but says this lesion was present for maybe 15 yrs before she sought attention.  Pregnancies:    Age of 1st pregnancy:23  Family History of Breast Cancer: denies  Family History of Ovarian Cancer:Denies  History of HRT use:  Denies       PAST SURGICAL HISTORY:  Past Surgical History:   Procedure Laterality Date    CATARACT EXTRACTION Right     COLONOSCOPY      GALLBLADDER SURGERY      MASTECTOMY Bilateral     " PARATHYROIDECTOMY      VENOUS ACCESS DEVICE (PORT) INSERTION Right 1/13/2025    Procedure: INSERTION VENOUS ACCESS DEVICE;  Surgeon: Karen Johnston MD;  Location: [facility];  Service: General;  Laterality: Right;   -  Parathyrodectomy  -  S/p mario cataract surgery w/ implant placement  -  CCU    FAMILY HISTORY:  Family History   Problem Relation Age of Onset    Heart attack Father     Hypertension Sister     Heart attack Sister     Diabetes Sister    -  Denies family h/o malignancy of any kind    SOCIAL HISTORY:  Social History     Socioeconomic History    Marital status:    Tobacco Use    Smoking status: Former     Types: Cigarettes    Smokeless tobacco: Never   Vaping Use    Vaping status: Never Used   Substance and Sexual Activity    Alcohol use: Never    Drug use: Never    Sexual activity: Defer   -  .  Lives alone.  Used to own a flower shop but retired around 2012.  Former smoker, quit around age 33 yo      REVIEW OF SYSTEMS:   A comprehensive 14 point review of systems was performed.  Significant findings as mentioned above.  All other systems reviewed and are negative.      MEDICATIONS:  The current medication list was reviewed in the EMR    Current Outpatient Medications:     atorvastatin (LIPITOR) 20 MG tablet, Take 1 tablet by mouth Daily., Disp: , Rfl:     Carboxymethylcellulose Sodium (EYE DROPS OP), Apply 1 drop to eye(s) as directed by provider 2 (Two) Times a Day., Disp: , Rfl:     ibuprofen (ADVIL,MOTRIN) 600 MG tablet, Take 1 tablet by mouth Every 8 (Eight) Hours As Needed for Mild Pain. Please take on day following chemotherapy prior to growth factor injection., Disp: 30 tablet, Rfl: 0    lidocaine-prilocaine (EMLA) 2.5-2.5 % cream, Apply to port-a-cath site 30 minutes prior to arrival at infusion center. Cover with plastic wrap., Disp: 30 g, Rfl: 1    loratadine (CLARITIN) 10 MG tablet, Take 1 tablet by mouth daily on the day following chemotherapy prior to growth factor  "injection and continue for 7 days., Disp: 7 tablet, Rfl: 5    OLANZapine (zyPREXA) 5 MG tablet, Take 1 tablet by mouth for 4 doses starting night of chemotherapy., Disp: 8 tablet, Rfl: 1    ondansetron (ZOFRAN) 8 MG tablet, Take 1 tablet by mouth 3 (Three) Times a Day As Needed for Nausea or Vomiting., Disp: 30 tablet, Rfl: 5    prochlorperazine (COMPAZINE) 10 MG tablet, Take 1 tablet by mouth Every 6 (Six) Hours As Needed for Nausea or Vomiting., Disp: 30 tablet, Rfl: 1    vitamin D (ERGOCALCIFEROL) 1.25 MG (65093 UT) capsule capsule, Take 1 capsule by mouth 1 (One) Time Per Week., Disp: 4 capsule, Rfl: 5    hydroCHLOROthiazide 25 MG tablet, Take 1 tablet by mouth Daily. (Patient not taking: Reported on 1/31/2025), Disp: , Rfl:     ALLERGIES:  No Known Allergies    PHYSICAL EXAM:  Vitals:    01/31/25 0955   BP: 143/76   Pulse: 86   Resp: 20   Temp: 98 °F (36.7 °C)   TempSrc: Temporal   SpO2: 97%   Weight: 104 kg (229 lb 6.4 oz)   Height: 172.7 cm (68\")   PainSc: 0-No pain         Body surface area is 2.17 meters squared.   Body mass index is 34.88 kg/m².   ECOG score: 0     General:  Awake, alert and oriented, in no distress.  In good spirits.  HEENT:  Pupils are equal, round and reactive to light and accommodation, Extra-ocular movements full, Oropharyx clear, mucous membranes moist  Neck:  No JVD, thyromegaly or lymphadenopathy  CV:  Regular rate and rhythm, no murmurs, rubs or gallops  Resp:  Lungs are clear to auscultation bilaterally, no rhonchi  Breast:  S/p mario mastectomies.  Surgical incisions are smooth and intact without nodularity..   No axillary LAD on either side.  Abd:  Soft, non-tender, non-distended, bowel sounds present, no palpable organomegaly or masses  Ext:  No clubbing, cyanosis, no lower extremity edema  Back:  Over the lower L back near the waistline, she previously had an area of cellulitis maybe 8x2.5 cm with induration / thickening and erythema of the skin.  There was some element of " abscess formation with purulent drainage.  This is now completely resolved without erythema warmth or fluctuance.  Lymph:  No cervical, supraclavicular, axillary, inguinal or femoral adenopathy  Neuro:  MS as above, CN II-XII intact, grossly non-focal exam      PATHOLOGY:  8/28/24 11/4/24            ENDOSCOPY:        IMAGING:  Mammo Diagnostic Digital Tomosynthesis Bilateral With CAD (07/19/2024 10:42) & US Breast Left Limited (07/19/2024 11:35)   FINDINGS:    Irregular hypodense mass upper outer left breast, anterior depth, 1-2 o'clock radian, with pleomorphic calcifications, measures 3.9 x 2.4 cm (image 40 MLO, image 37 CC).      Irregular lesion with coarse calcification is seen at left 2-3 o'clock radian, posterior depth measuring 1.7 x 1.5 cm (image 26 MLO, image 23 CC).      Enlarged left axillary lymph nodes are seen.      Benign secretory calcifications are seen bilaterally.      No additional suspicious masses, tissue distortion, or suspicious calcifications are identified.      Ultrasound:      Real time, targeted, technologist performed sonographic imaging of the left breast was performed utilizing a multihertz transducer.       Ultrasound was performed in the left upper outer quadrant and axilla.      --- Lobulated mass is seen at left 11-2 o'clock radian, 4 cm from the nipple, central vascular flow, 5.0 x 2.9 x 3.4 cm. This corresponds to the mammogram.    --- Spiculated lesion at 2-3 o'clock radian, 9 cm from the nipple, measuring 1.7 x 1.0 x 1.1 cm. This corresponds to the distortion seen on mammogram.      Enlarging nodes are seen in the left axilla. The largest node measures 2.0 x 2.5 x 1.3 cm.     IMPRESSION:  1.   No mammographic evidence of malignancy is are seen in the right breast.        2.   Breast masses at left 11-3 o'clock radian. These are highly suspicious for malignant process.    3. Enlarging nodes in the left axilla which are suspicious for metastatic disease.          RECOMMENDED FOLLOWUP: Ultrasound guided biopsy of the large mass at 11-2 o'clock radian, left 2-3 o'clock radian and one of the left axillary lymph nodes.      BI-RADS category 5: Highly suggestive of malignancy.       MRI Abdomen With & Without Contrast (08/14/2024 14:05)   FINDINGS:      LOWER CHEST: Lung bases are clear. Small hiatal hernia.      LIVER: Normal contours. No mass. Vascular shunt in the dome (for example on bolus phase series, image 119). No steatosis. No intrahepatic biliary dilatation. Portal and hepatic veins are patent.      GALLBLADDER: Removed.      COMMON BILE DUCT: Normal caliber. No stones.       SPLEEN: Within normal limits.      PANCREAS: No mass. No pancreatic fluid collections. The pancreatic duct is normal.      ADRENALS: No masses.      KIDNEYS: No solid left renal lesion identified, to correspond to the abnormality described on recent ultrasound. A parapelvic cyst in the left kidney measuring 2 cm. A couple of small cortical cysts in the right kidney measuring up to 1.3 cm. No hydronephrosis.      LYMPH NODES: No adenopathy.      STOMACH, SMALL BOWEL AND COLON: No bowel wall thickening or obstruction.      PERITONEAL CAVITY: No mesenteric stranding or free fluid.      ABDOMINAL AORTA: No aneurysm.      SOFT TISSUES: Normal.      OSSEOUS STRUCTURES: No acute fracture or destructive lesion.     IMPRESSION:  1.   No solid left renal lesion identified, to correspond to the abnormality described on recent ultrasound. Few bilateral renal cysts, including a parapelvic cyst in the left kidney measuring 2 cm.   2.   Additional noncontributory findings described above.     Mammo Diagnostic Digital Tomosynthesis Left With CAD (08/28/2024 09:46) & US Breast Left Limited (08/28/2024 11:11)   FINDINGS:   Findings: There are multiple suspicious findings in the left breast as listed below:     Finding 1: There is a irregular mass with associated calcifications abutting the skin, measuring  mammographically 45 x 42 x 30 mm. Targeted left breast ultrasound at the 1:00 position, 4 cm from the revealed a corresponding sonographic irregular mass with calcifications measuring sonographically 4.6 x 2.7 x 3 cm. This extends to the skin. Finding is hard Elastography. Finding is highly suspicious.     Finding 2: There is a irregular mass is associated architectural distortion and coarse central calcification as well as fine: calcifications in the approximate 2:00 position of the left breast middle to posterior depth, 13 cm from the nipple. Targeted left breast ultrasound the 2:00 position 13 cm from the reveals an irregular 8 x 6 x 6 mm mass, with adjacent vascularity, intermediate Elastography.   Finding is highly suspicious.     Finding 3: In the 12:00 position of the left breast there is a 45 mm span of fine pleomorphic calcifications with linear/segmental distribution with associated subtle asymmetry best appreciated in the magnified views, full field CC, slice 40 of 79 as well as ML slice 47/91. These are located approximately 8 cm from nipple. Targeted right breast ultrasound in the left breast at the 12:00 position 8 cm from the nipple identifies an irregular mass, soft on Elastography, measuring 14 x 11 x 12 mm, which appears underestimated sonographically compared to mammographic span of 45 mm linear calcifications. Finding is highly suspicious     Finding 4: There are additional small highly suspicious asymmetries with microcalcifications scattered throughout the inferior left breast and lower inner quadrant in the approximate 6:00-9:00 position middle to posterior depth: The total span of these lower inner quadrant asymmetries measures up to 10 x 7 x 6 cm as best appreciated on CC full-field, slice 13 of 79 and ML slice 27 out of 92. Findings are highly suspicious indicating multicentric disease.     Finding 5: There are  2 abnormal lymph nodes in the left axilla corresponding to completely replaced  lymph nodes labeled as lymph node1 and 2 on US; these are highly suspicious with no demonstration of fatty hilum. This lymph nodes measure respectively 22 mm and 18 mm each, adjacent to each other.       Right breast: A repeat right diagnostic mammogram was not performed as review of outside right breast mammogram did not reveal any suspicious areas of concern.     IMPRESSION:  BI-RADS Code: BI-RADS 5, Highly suggestive of malignancy.   Multicentric left breast disease with highly suspicious at least 2 left axillary lymph nodes     RECOMMENDATIONS:   Left Breast Recommendations: Ultrasound Guided Breast Biopsy for findings 1 and 2 to confirm multicentric disease.     Fine Needle Aspiration     Right diagnostic mammogram in one year.     US Axilla Left (09/12/2024 12:02)   FINDINGS:   Final mammographic images demonstrate the localizer tag to be located within the left axillary lymph node and adjacent to the twirl clip.     Left Breast Density: The breast tissue is heterogeneously dense, which may obscure small masses.     IMPRESSION:  Successful localization of the left axillary lymph node with a pink smartclip.     MR Head w and wo IV Contrast (09/25/2024 08:32)   Findings:     No enhancing lesions to suggest intracranial metastatic disease.     No evidence of restricted diffusion to suggest acute territorial infarct.     No evidence of mass effect, midline shift, ration, hydrocephalus.     No acute appearing intracranial hemorrhage.  No extra-axial fluid collections.  Scattered periventricular and subcortical T2/FLAIR hyperintense foci within the supratentorial brain, probably related to small vessel ischemic changes.  Age-appropriate ventricular size and configuration.     Basal cisterns are patent.  Major intracranial flow voids are preserved.     Paranasal sinuses  are clear.  Mastoid cells are clear.  Postsurgical changes of the orbits.     3 mm enhancing lesion within the left frontal bone could be related to  hemangioma (series 6 image 20, series 13 image 20), although underlying metastatic disease cannot be totally excluded.  Attention to this area on follow-up imaging.     No additional calvarial lesions.     Impression:  No abnormal intraparenchymal postcontrast enhancement to suggest intraparenchymal metastatic disease.      No acute intracranial process.  Sequelae of small vessel ischemic changes.     3 mm enhancing lesion within the left frontal bone could be related to hemangioma (series 6 image 20, series 13 image 20), although underlying metastatic disease cannot be totally excluded.  Attention to this area on follow-up imaging.     CT Abdomen Pelvis With Contrast (09/25/2024 12:40)   FINDINGS:   Mediastinum and Pleura: No mediastinal or hilar adenopathy. No pleural or pericardial effusion.     Lungs: 5 mm peripheral right upper lobe nodule, image 49 of series 3. Subpleural 6 mm peripheral right middle lobe nodule, image 58 series 3. Subpleural 6 mm right basal nodule, image 73 of series 3. No acute airspace disease. The central airways are unremarkable.     Abdomen and Pelvis: No focal hepatic lesions. Gallbladder is removed. No biliary dilatation. No splenic lesions. Normal adrenal glands. No pancreatic lesions or peripancreatic stranding. Exophytic right renal cortical cysts. No suspicious renal parenchymal lesions. No renal collecting system dilatation.     Calcifications involving abdominal aorta, bilateral iliac vessels and SMA, with associated moderate to significant stenosis of the distal SMA trunk.     No enlarged lymph nodes. No mesenteric or retroperitoneal nodularity. No free fluid or air within the abdomen or pelvis.     Unremarkable stomach. Normal caliber of small and large bowel. Distal colonic diverticulosis without evidence of complications.     Urinary bladder is within normal limits. No abnormal soft tissue densities in the regions of adnexa.     Musculoskeletal: Right breast is only  partially included, without discrete suspicious lesions noted. There is a biopsied lesion within left breast, image 34 of series 2, measures up to 2.1 cm. There is also a left axillary lymphadenopathy, status post biopsy, with example of left axillary lymph node on image 37 of series 2 measuring up to 1.7 cm.     No aggressive osseous lesions or acute fractures.     IMPRESSION:  Left breast lesion and left axillary lymphadenopathy, representing known breast cancer with metastasis.   Several peripheral right lung nodules, nonspecific.   No enlarged mediastinal lymph nodes.   No suspicious lesions within the abdomen or pelvis.   No suspicious osseous lesions are identified.     CT Chest With Contrast Diagnostic (09/25/2024 12:40)   FINDINGS:   Mediastinum and Pleura: No mediastinal or hilar adenopathy. No pleural or pericardial effusion.     Lungs: 5 mm peripheral right upper lobe nodule, image 49 of series 3. Subpleural 6 mm peripheral right middle lobe nodule, image 58 series 3. Subpleural 6 mm right basal nodule, image 73 of series 3. No acute airspace disease. The central airways are unremarkable.     Abdomen and Pelvis: No focal hepatic lesions. Gallbladder is removed. No biliary dilatation. No splenic lesions. Normal adrenal glands. No pancreatic lesions or peripancreatic stranding. Exophytic right renal cortical cysts. No suspicious renal parenchymal lesions. No renal collecting system dilatation.     Calcifications involving abdominal aorta, bilateral iliac vessels and SMA, with associated moderate to significant stenosis of the distal SMA trunk.     No enlarged lymph nodes. No mesenteric or retroperitoneal nodularity. No free fluid or air within the abdomen or pelvis.     Unremarkable stomach. Normal caliber of small and large bowel. Distal colonic diverticulosis without evidence of complications.     Urinary bladder is within normal limits. No abnormal soft tissue densities in the regions of adnexa.      Musculoskeletal: Right breast is only partially included, without discrete suspicious lesions noted. There is a biopsied lesion within left breast, image 34 of series 2, measures up to 2.1 cm. There is also a left axillary lymphadenopathy, status post biopsy, with example of left axillary lymph node on image 37 of series 2 measuring up to 1.7 cm.     No aggressive osseous lesions or acute fractures.     IMPRESSION:  Left breast lesion and left axillary lymphadenopathy, representing known breast cancer with metastasis.   Several peripheral right lung nodules, nonspecific.   No enlarged mediastinal lymph nodes.   No suspicious lesions within the abdomen or pelvis.   No suspicious osseous lesions are identified.     NM Bone Scan Whole Body (09/25/2024 13:41)   FINDINGS:   Bones: No sites of focal increased (hot) and decreased (cold) uptake in axial and appendicular skeleton to suggest bone metastasis.     Bones/Joints: Sites of mild uptake consistent with benign arthritic, degenerative or post traumatic changes.     Soft-tissues: Asymmetric mild uptake in the left breast soft tissues relative to the right. Two kidneys visualized.     IMPRESSION:  No evidence of osseous metastatic disease.     Adult Transthoracic Echo Complete W/ Cont if Necessary Per Protocol (01/03/2025 09:52)   Interpretation Summary    Left ventricular systolic function is normal. Estimated left ventricular EF = 55%    Left ventricular wall thickness is consistent with moderate concentric hypertrophy.    Left ventricular diastolic function is consistent with (grade I) impaired relaxation.    Estimated right ventricular systolic pressure from tricuspid regurgitation is normal (<35 mmHg).    RECENT LABS:  Lab Results   Component Value Date    WBC 5.92 01/31/2025    HGB 11.0 (L) 01/31/2025    HCT 34.7 01/31/2025    MCV 92.5 01/31/2025    RDW 13.1 01/31/2025     01/31/2025    NEUTRORELPCT 60.2 01/31/2025    LYMPHORELPCT 20.8 01/31/2025     MONORELPCT 13.7 (H) 01/31/2025    EOSRELPCT 0.3 01/31/2025    BASORELPCT 0.8 01/31/2025    NEUTROABS 3.56 01/31/2025    LYMPHSABS 1.23 01/31/2025       Lab Results   Component Value Date     01/31/2025    K 3.9 01/31/2025    CO2 28.2 01/31/2025     01/31/2025    BUN 18 01/31/2025    CREATININE 1.02 (H) 01/31/2025    GLUCOSE 105 (H) 01/31/2025    CALCIUM 8.4 (L) 01/31/2025    ALKPHOS 130 (H) 01/31/2025    AST 20 01/31/2025    ALT 18 01/31/2025    BILITOT <0.2 01/31/2025    ALBUMIN 3.6 01/31/2025    PROTEINTOT 6.5 01/31/2025    MG 1.9 01/22/2025     Lab Results   Component Value Date    FERRITIN 87.37 11/25/2024    IRON 40 11/25/2024    TIBC 459 11/25/2024    LABIRON 9 (L) 11/25/2024    [id number] 335 11/25/2024    FOLATE 7.32 11/25/2024     Lab Results   Component Value Date    TSH 2.790 11/25/2024     Lab Results   Component Value Date    BGVN02XK 26.3 (L) 11/25/2024     Lab Results   Component Value Date    LABCA2 44.6 (H) 11/25/2024      Lab Results   Component Value Date     37.7 (H) 11/25/2024       ASSESSMENT & PLAN:  Kristyn Goff is a very pleasant 75 y.o. female with Stage IIIA (pT2(m)N2aMX moderately differentiated, multifocal invasive ductal carcinoma with micropapillary features.  Tumors were 30 mm and 4 mm in diameter.  There was associated high grade DCIS.  Surgical margins were clear.  She had involvement of 3/4 SLN and 1/1 other LN.  Largest LN metastasis was 22 mm with extranodal extension.  Tumor was >95% 3+ ER+, 81-90% 2+ IL+, HER-2/Estelita - (0).    1.  Left Breast Cancer:  -  Patient and family report that the mass was present for about 15 years before she had it evaluated.  -  She is s/p successful L mastectomy w/ clear margins and prophylactic contralateral mastectomy performed by Dr. Winnie He 11/4/24.  -She is now well-healed from her surgery.  - Baseline echocardiogram performed 1/3/2025 showed low normal LVEF 55%.  -I s recommended adjuvant chemotherapy to consist of  DDAC x 4 with growth factor support followed by weekly Taxol x 12. We discussed potential risks benefits and side effects and she decided to proceed.  - She took her first cycle of dose dense AC chemotherapy on 1/16/2025.  Unfortunately shortly thereafter she came down with norovirus infection with prolific diarrhea.  This is now well resolved.  She has no other symptoms or concerns.  - Will proceed with cycle 2 chemotherapy today.  - She will see APRN with cycle 3 on 2/14/2025 for toxicity check with treatment labs.  I will see her back on 2/28/2025 again for toxicity check with treatment labs.  -  She will require adjuvant radiation following her chemotherapy given tara disease with extranodal extension.  -  Following adjuvant radiation she will require hormonal therapy.  Will plan to get DEXA closer to that time.      2.  Abscess L back:  -Resolved    3.  Abnormal imaging findings:  -  She had CT CAP performed 9/25/24 which showed several tiny, nonspecific lung nodules in the R lung measuring 5-6 mm.  These were previously described on CTPE imaging 4/30/23.  Will need f/u CT imaging after an interval.  -  She also had 3 mm enhancing lesion in the L frontal bone noted on Brain MRI 9/25/24.  Could be c/w hemangioma.  Could not completely r/o metastatic lesion.  Bone scan 9/25/24 was negative.     -  Will plan to reimage after an interval.    4.  Diarrhea  - Turned out to be related to a norovirus infection from which she is now well recovered.  Diarrhea has resolved.      5.  Prophylaxis:  -  Kristyn Goff did not receive 2024 flu vaccine.  This was recommended but declined.  -  Kristyn Goff did not receive Prevnar vaccine.  This was recommended but declined.  -  Kristyn Goff did not receive COVID vaccine.  This was recommended but declined.      6. ACO / DANK/Other  Quality measures  -  Kristyn Goff did not receive 2024 flu vaccine.  This was recommended but declined.  -  Kristyn Goff reports a pain  score of 0.    -  Current outpatient and discharge medications have been reconciled for the patient.  Reviewed by: Kaylee Tellez MD      7.  F/u:  - proceed with cycle 2 dose dense AC today.  - Return to see APRN for toxicity check with cycle 3 dose dense AC on 2/14/2025 with treatment labs.  - I will see her back on 2/28/2025 with treatment labs prior to cycle 4 of chemotherapy.          I spent 30 minutes with Kristyn JONES Shell today.  In the office today, more than 50% of this time was spent face-to-face with her  in counseling / coordination of care, reviewing her medical history and counseling on the current treatment plan.  All questions were answered to her satisfaction      Electronically Signed by: Kaylee Tellez MD       CC:     JOHN Toledo DO Veronica Morgan Jones, MD

## 2025-01-31 ENCOUNTER — LAB (OUTPATIENT)
Dept: ONCOLOGY | Facility: CLINIC | Age: 75
End: 2025-01-31
Payer: MEDICARE

## 2025-01-31 ENCOUNTER — INFUSION (OUTPATIENT)
Dept: ONCOLOGY | Facility: HOSPITAL | Age: 75
End: 2025-01-31
Payer: MEDICARE

## 2025-01-31 ENCOUNTER — OFFICE VISIT (OUTPATIENT)
Dept: ONCOLOGY | Facility: CLINIC | Age: 75
End: 2025-01-31
Payer: MEDICARE

## 2025-01-31 VITALS
OXYGEN SATURATION: 97 % | DIASTOLIC BLOOD PRESSURE: 76 MMHG | BODY MASS INDEX: 34.77 KG/M2 | SYSTOLIC BLOOD PRESSURE: 143 MMHG | RESPIRATION RATE: 20 BRPM | HEIGHT: 68 IN | HEART RATE: 86 BPM | WEIGHT: 229.4 LBS | TEMPERATURE: 98 F

## 2025-01-31 VITALS
TEMPERATURE: 97.3 F | HEART RATE: 92 BPM | SYSTOLIC BLOOD PRESSURE: 162 MMHG | RESPIRATION RATE: 16 BRPM | DIASTOLIC BLOOD PRESSURE: 70 MMHG | OXYGEN SATURATION: 98 %

## 2025-01-31 DIAGNOSIS — C50.812 MALIGNANT NEOPLASM OF OVERLAPPING SITES OF LEFT BREAST IN FEMALE, ESTROGEN RECEPTOR POSITIVE: Primary | ICD-10-CM

## 2025-01-31 DIAGNOSIS — Z17.0 MALIGNANT NEOPLASM OF OVERLAPPING SITES OF LEFT BREAST IN FEMALE, ESTROGEN RECEPTOR POSITIVE: ICD-10-CM

## 2025-01-31 DIAGNOSIS — C50.812 MALIGNANT NEOPLASM OF OVERLAPPING SITES OF LEFT BREAST IN FEMALE, ESTROGEN RECEPTOR POSITIVE: ICD-10-CM

## 2025-01-31 DIAGNOSIS — R19.7 DIARRHEA, UNSPECIFIED TYPE: ICD-10-CM

## 2025-01-31 DIAGNOSIS — Z17.0 MALIGNANT NEOPLASM OF OVERLAPPING SITES OF LEFT BREAST IN FEMALE, ESTROGEN RECEPTOR POSITIVE: Primary | ICD-10-CM

## 2025-01-31 LAB
ALBUMIN SERPL-MCNC: 3.6 G/DL (ref 3.5–5.2)
ALBUMIN/GLOB SERPL: 1.2 G/DL
ALP SERPL-CCNC: 130 U/L (ref 39–117)
ALT SERPL W P-5'-P-CCNC: 18 U/L (ref 1–33)
ANION GAP SERPL CALCULATED.3IONS-SCNC: 11.8 MMOL/L (ref 5–15)
AST SERPL-CCNC: 20 U/L (ref 1–32)
BASOPHILS # BLD AUTO: 0.05 10*3/MM3 (ref 0–0.2)
BASOPHILS NFR BLD AUTO: 0.8 % (ref 0–1.5)
BILIRUB SERPL-MCNC: <0.2 MG/DL (ref 0–1.2)
BUN SERPL-MCNC: 18 MG/DL (ref 8–23)
BUN/CREAT SERPL: 17.6 (ref 7–25)
CALCIUM SPEC-SCNC: 8.4 MG/DL (ref 8.6–10.5)
CHLORIDE SERPL-SCNC: 102 MMOL/L (ref 98–107)
CO2 SERPL-SCNC: 28.2 MMOL/L (ref 22–29)
CREAT SERPL-MCNC: 1.02 MG/DL (ref 0.57–1)
DEPRECATED RDW RBC AUTO: 44 FL (ref 37–54)
EGFRCR SERPLBLD CKD-EPI 2021: 57.1 ML/MIN/1.73
EOSINOPHIL # BLD AUTO: 0.02 10*3/MM3 (ref 0–0.4)
EOSINOPHIL NFR BLD AUTO: 0.3 % (ref 0.3–6.2)
ERYTHROCYTE [DISTWIDTH] IN BLOOD BY AUTOMATED COUNT: 13.1 % (ref 12.3–15.4)
GLOBULIN UR ELPH-MCNC: 2.9 GM/DL
GLUCOSE SERPL-MCNC: 105 MG/DL (ref 65–99)
HCT VFR BLD AUTO: 34.7 % (ref 34–46.6)
HGB BLD-MCNC: 11 G/DL (ref 12–15.9)
IMM GRANULOCYTES # BLD AUTO: 0.25 10*3/MM3 (ref 0–0.05)
IMM GRANULOCYTES NFR BLD AUTO: 4.2 % (ref 0–0.5)
LYMPHOCYTES # BLD AUTO: 1.23 10*3/MM3 (ref 0.7–3.1)
LYMPHOCYTES NFR BLD AUTO: 20.8 % (ref 19.6–45.3)
MCH RBC QN AUTO: 29.3 PG (ref 26.6–33)
MCHC RBC AUTO-ENTMCNC: 31.7 G/DL (ref 31.5–35.7)
MCV RBC AUTO: 92.5 FL (ref 79–97)
MONOCYTES # BLD AUTO: 0.81 10*3/MM3 (ref 0.1–0.9)
MONOCYTES NFR BLD AUTO: 13.7 % (ref 5–12)
NEUTROPHILS NFR BLD AUTO: 3.56 10*3/MM3 (ref 1.7–7)
NEUTROPHILS NFR BLD AUTO: 60.2 % (ref 42.7–76)
NRBC BLD AUTO-RTO: 0 /100 WBC (ref 0–0.2)
PLATELET # BLD AUTO: 294 10*3/MM3 (ref 140–450)
PMV BLD AUTO: 10.1 FL (ref 6–12)
POTASSIUM SERPL-SCNC: 3.9 MMOL/L (ref 3.5–5.2)
PROT SERPL-MCNC: 6.5 G/DL (ref 6–8.5)
RBC # BLD AUTO: 3.75 10*6/MM3 (ref 3.77–5.28)
SODIUM SERPL-SCNC: 142 MMOL/L (ref 136–145)
WBC NRBC COR # BLD AUTO: 5.92 10*3/MM3 (ref 3.4–10.8)

## 2025-01-31 PROCEDURE — 25010000002 CYCLOPHOSPHAMIDE 1 GM/5ML SOLUTION 5 ML VIAL: Performed by: INTERNAL MEDICINE

## 2025-01-31 PROCEDURE — 25010000002 HEPARIN LOCK FLUSH PER 10 UNITS

## 2025-01-31 PROCEDURE — 85025 COMPLETE CBC W/AUTO DIFF WBC: CPT | Performed by: NURSE PRACTITIONER

## 2025-01-31 PROCEDURE — 25810000003 SODIUM CHLORIDE 0.9 % SOLUTION 250 ML FLEX CONT: Performed by: INTERNAL MEDICINE

## 2025-01-31 PROCEDURE — 96375 TX/PRO/DX INJ NEW DRUG ADDON: CPT

## 2025-01-31 PROCEDURE — 25010000002 FOSAPREPITANT PER 1 MG: Performed by: INTERNAL MEDICINE

## 2025-01-31 PROCEDURE — 25010000002 DOXORUBICIN PER 10 MG: Performed by: INTERNAL MEDICINE

## 2025-01-31 PROCEDURE — 80053 COMPREHEN METABOLIC PANEL: CPT | Performed by: NURSE PRACTITIONER

## 2025-01-31 PROCEDURE — 96411 CHEMO IV PUSH ADDL DRUG: CPT

## 2025-01-31 PROCEDURE — 96367 TX/PROPH/DG ADDL SEQ IV INF: CPT

## 2025-01-31 PROCEDURE — 96413 CHEMO IV INFUSION 1 HR: CPT

## 2025-01-31 PROCEDURE — 25010000002 PEGFILGRASTIM 6 MG/0.6ML PREFILLED SYRINGE KIT: Performed by: INTERNAL MEDICINE

## 2025-01-31 PROCEDURE — 99214 OFFICE O/P EST MOD 30 MIN: CPT | Performed by: INTERNAL MEDICINE

## 2025-01-31 PROCEDURE — 25010000002 PALONOSETRON PER 25 MCG: Performed by: INTERNAL MEDICINE

## 2025-01-31 PROCEDURE — 1126F AMNT PAIN NOTED NONE PRSNT: CPT | Performed by: INTERNAL MEDICINE

## 2025-01-31 PROCEDURE — 25010000002 DEXAMETHASONE SODIUM PHOSPHATE 20 MG/5ML SOLUTION: Performed by: INTERNAL MEDICINE

## 2025-01-31 PROCEDURE — 96377 APPLICATON ON-BODY INJECTOR: CPT

## 2025-01-31 RX ORDER — PALONOSETRON 0.05 MG/ML
0.25 INJECTION, SOLUTION INTRAVENOUS ONCE
Status: COMPLETED | OUTPATIENT
Start: 2025-01-31 | End: 2025-01-31

## 2025-01-31 RX ORDER — SODIUM CHLORIDE 9 MG/ML
20 INJECTION, SOLUTION INTRAVENOUS ONCE
Status: DISCONTINUED | OUTPATIENT
Start: 2025-01-31 | End: 2025-01-31 | Stop reason: HOSPADM

## 2025-01-31 RX ORDER — SODIUM CHLORIDE 0.9 % (FLUSH) 0.9 %
10 SYRINGE (ML) INJECTION AS NEEDED
OUTPATIENT
Start: 2025-01-31

## 2025-01-31 RX ORDER — HEPARIN SODIUM (PORCINE) LOCK FLUSH IV SOLN 100 UNIT/ML 100 UNIT/ML
500 SOLUTION INTRAVENOUS AS NEEDED
Status: DISCONTINUED | OUTPATIENT
Start: 2025-01-31 | End: 2025-01-31 | Stop reason: HOSPADM

## 2025-01-31 RX ORDER — SODIUM CHLORIDE 0.9 % (FLUSH) 0.9 %
10 SYRINGE (ML) INJECTION AS NEEDED
Status: DISCONTINUED | OUTPATIENT
Start: 2025-01-31 | End: 2025-01-31 | Stop reason: HOSPADM

## 2025-01-31 RX ORDER — DOXORUBICIN HYDROCHLORIDE 2 MG/ML
60 INJECTION, SOLUTION INTRAVENOUS ONCE
Status: COMPLETED | OUTPATIENT
Start: 2025-01-31 | End: 2025-01-31

## 2025-01-31 RX ORDER — HEPARIN SODIUM (PORCINE) LOCK FLUSH IV SOLN 100 UNIT/ML 100 UNIT/ML
500 SOLUTION INTRAVENOUS AS NEEDED
OUTPATIENT
Start: 2025-01-31

## 2025-01-31 RX ADMIN — PALONOSETRON HYDROCHLORIDE 0.25 MG: 0.25 INJECTION INTRAVENOUS at 11:52

## 2025-01-31 RX ADMIN — PEGFILGRASTIM 6 MG: KIT SUBCUTANEOUS at 13:50

## 2025-01-31 RX ADMIN — SODIUM CHLORIDE 150 MG: 9 INJECTION, SOLUTION INTRAVENOUS at 12:08

## 2025-01-31 RX ADMIN — DOXORUBICIN HYDROCHLORIDE 130 MG: 2 INJECTION, SOLUTION INTRAVENOUS at 12:48

## 2025-01-31 RX ADMIN — DEXAMETHASONE SODIUM PHOSPHATE 12 MG: 4 INJECTION, SOLUTION INTRA-ARTICULAR; INTRALESIONAL; INTRAMUSCULAR; INTRAVENOUS; SOFT TISSUE at 11:54

## 2025-01-31 RX ADMIN — HEPARIN 500 UNITS: 100 SYRINGE at 13:46

## 2025-01-31 RX ADMIN — CYCLOPHOSPHAMIDE 1300 MG: 1 INJECTION INTRAVENOUS at 13:11

## 2025-02-14 ENCOUNTER — LAB (OUTPATIENT)
Dept: ONCOLOGY | Facility: CLINIC | Age: 75
End: 2025-02-14
Payer: MEDICARE

## 2025-02-14 ENCOUNTER — INFUSION (OUTPATIENT)
Dept: ONCOLOGY | Facility: HOSPITAL | Age: 75
End: 2025-02-14
Payer: MEDICARE

## 2025-02-14 ENCOUNTER — APPOINTMENT (OUTPATIENT)
Dept: ONCOLOGY | Facility: HOSPITAL | Age: 75
End: 2025-02-14
Payer: MEDICARE

## 2025-02-14 ENCOUNTER — OFFICE VISIT (OUTPATIENT)
Dept: ONCOLOGY | Facility: CLINIC | Age: 75
End: 2025-02-14
Payer: MEDICARE

## 2025-02-14 VITALS
TEMPERATURE: 98 F | OXYGEN SATURATION: 95 % | WEIGHT: 227 LBS | BODY MASS INDEX: 34.4 KG/M2 | RESPIRATION RATE: 20 BRPM | HEART RATE: 125 BPM | HEIGHT: 68 IN | DIASTOLIC BLOOD PRESSURE: 76 MMHG | SYSTOLIC BLOOD PRESSURE: 137 MMHG

## 2025-02-14 VITALS
HEART RATE: 93 BPM | OXYGEN SATURATION: 95 % | TEMPERATURE: 97.6 F | DIASTOLIC BLOOD PRESSURE: 62 MMHG | RESPIRATION RATE: 20 BRPM | SYSTOLIC BLOOD PRESSURE: 123 MMHG

## 2025-02-14 DIAGNOSIS — C50.812 MALIGNANT NEOPLASM OF OVERLAPPING SITES OF LEFT BREAST IN FEMALE, ESTROGEN RECEPTOR POSITIVE: ICD-10-CM

## 2025-02-14 DIAGNOSIS — Z17.0 MALIGNANT NEOPLASM OF OVERLAPPING SITES OF LEFT BREAST IN FEMALE, ESTROGEN RECEPTOR POSITIVE: ICD-10-CM

## 2025-02-14 DIAGNOSIS — C50.812 MALIGNANT NEOPLASM OF OVERLAPPING SITES OF LEFT BREAST IN FEMALE, ESTROGEN RECEPTOR POSITIVE: Primary | ICD-10-CM

## 2025-02-14 DIAGNOSIS — Z17.0 MALIGNANT NEOPLASM OF OVERLAPPING SITES OF LEFT BREAST IN FEMALE, ESTROGEN RECEPTOR POSITIVE: Primary | ICD-10-CM

## 2025-02-14 DIAGNOSIS — R19.7 DIARRHEA, UNSPECIFIED TYPE: ICD-10-CM

## 2025-02-14 DIAGNOSIS — L02.212 ABSCESS OF BACK: ICD-10-CM

## 2025-02-14 DIAGNOSIS — R79.89 ELEVATED SERUM CREATININE: Primary | ICD-10-CM

## 2025-02-14 LAB
ALBUMIN SERPL-MCNC: 3.9 G/DL (ref 3.5–5.2)
ALBUMIN/GLOB SERPL: 1.5 G/DL
ALP SERPL-CCNC: 151 U/L (ref 39–117)
ALT SERPL W P-5'-P-CCNC: 15 U/L (ref 1–33)
ANION GAP SERPL CALCULATED.3IONS-SCNC: 14.1 MMOL/L (ref 5–15)
AST SERPL-CCNC: 22 U/L (ref 1–32)
BASOPHILS # BLD AUTO: 0.07 10*3/MM3 (ref 0–0.2)
BASOPHILS NFR BLD AUTO: 0.7 % (ref 0–1.5)
BILIRUB SERPL-MCNC: 0.2 MG/DL (ref 0–1.2)
BUN SERPL-MCNC: 15 MG/DL (ref 8–23)
BUN/CREAT SERPL: 11 (ref 7–25)
CALCIUM SPEC-SCNC: 8.5 MG/DL (ref 8.6–10.5)
CHLORIDE SERPL-SCNC: 101 MMOL/L (ref 98–107)
CO2 SERPL-SCNC: 24.9 MMOL/L (ref 22–29)
CREAT SERPL-MCNC: 1.36 MG/DL (ref 0.57–1)
DEPRECATED RDW RBC AUTO: 43.8 FL (ref 37–54)
EGFRCR SERPLBLD CKD-EPI 2021: 40.5 ML/MIN/1.73
EOSINOPHIL # BLD AUTO: 0.01 10*3/MM3 (ref 0–0.4)
EOSINOPHIL NFR BLD AUTO: 0.1 % (ref 0.3–6.2)
ERYTHROCYTE [DISTWIDTH] IN BLOOD BY AUTOMATED COUNT: 13.4 % (ref 12.3–15.4)
GLOBULIN UR ELPH-MCNC: 2.6 GM/DL
GLUCOSE SERPL-MCNC: 121 MG/DL (ref 65–99)
HCT VFR BLD AUTO: 35 % (ref 34–46.6)
HGB BLD-MCNC: 11.2 G/DL (ref 12–15.9)
IMM GRANULOCYTES # BLD AUTO: 0.37 10*3/MM3 (ref 0–0.05)
IMM GRANULOCYTES NFR BLD AUTO: 3.7 % (ref 0–0.5)
LYMPHOCYTES # BLD AUTO: 0.81 10*3/MM3 (ref 0.7–3.1)
LYMPHOCYTES NFR BLD AUTO: 8.1 % (ref 19.6–45.3)
MCH RBC QN AUTO: 28.9 PG (ref 26.6–33)
MCHC RBC AUTO-ENTMCNC: 32 G/DL (ref 31.5–35.7)
MCV RBC AUTO: 90.4 FL (ref 79–97)
MONOCYTES # BLD AUTO: 1.04 10*3/MM3 (ref 0.1–0.9)
MONOCYTES NFR BLD AUTO: 10.3 % (ref 5–12)
NEUTROPHILS NFR BLD AUTO: 7.76 10*3/MM3 (ref 1.7–7)
NEUTROPHILS NFR BLD AUTO: 77.1 % (ref 42.7–76)
NRBC BLD AUTO-RTO: 0.3 /100 WBC (ref 0–0.2)
PLATELET # BLD AUTO: 215 10*3/MM3 (ref 140–450)
PMV BLD AUTO: 9.8 FL (ref 6–12)
POTASSIUM SERPL-SCNC: 3.6 MMOL/L (ref 3.5–5.2)
PROT SERPL-MCNC: 6.5 G/DL (ref 6–8.5)
RBC # BLD AUTO: 3.87 10*6/MM3 (ref 3.77–5.28)
SODIUM SERPL-SCNC: 140 MMOL/L (ref 136–145)
WBC NRBC COR # BLD AUTO: 10.06 10*3/MM3 (ref 3.4–10.8)

## 2025-02-14 PROCEDURE — 1159F MED LIST DOCD IN RCRD: CPT | Performed by: NURSE PRACTITIONER

## 2025-02-14 PROCEDURE — 96413 CHEMO IV INFUSION 1 HR: CPT

## 2025-02-14 PROCEDURE — 99214 OFFICE O/P EST MOD 30 MIN: CPT | Performed by: NURSE PRACTITIONER

## 2025-02-14 PROCEDURE — 25010000002 HEPARIN LOCK FLUSH PER 10 UNITS

## 2025-02-14 PROCEDURE — 96367 TX/PROPH/DG ADDL SEQ IV INF: CPT

## 2025-02-14 PROCEDURE — 1160F RVW MEDS BY RX/DR IN RCRD: CPT | Performed by: NURSE PRACTITIONER

## 2025-02-14 PROCEDURE — 25010000002 DOXORUBICIN PER 10 MG: Performed by: INTERNAL MEDICINE

## 2025-02-14 PROCEDURE — 80053 COMPREHEN METABOLIC PANEL: CPT | Performed by: INTERNAL MEDICINE

## 2025-02-14 PROCEDURE — 25010000002 CYCLOPHOSPHAMIDE 1 GM/5ML SOLUTION 5 ML VIAL: Performed by: INTERNAL MEDICINE

## 2025-02-14 PROCEDURE — 25810000003 SODIUM CHLORIDE 0.9 % SOLUTION 250 ML FLEX CONT: Performed by: INTERNAL MEDICINE

## 2025-02-14 PROCEDURE — 96417 CHEMO IV INFUS EACH ADDL SEQ: CPT

## 2025-02-14 PROCEDURE — 25010000002 PALONOSETRON 0.25 MG/5ML SOLUTION PREFILLED SYRINGE: Performed by: INTERNAL MEDICINE

## 2025-02-14 PROCEDURE — 25810000003 SODIUM CHLORIDE 0.9 % SOLUTION: Performed by: NURSE PRACTITIONER

## 2025-02-14 PROCEDURE — 25010000002 DEXAMETHASONE SODIUM PHOSPHATE 20 MG/5ML SOLUTION: Performed by: INTERNAL MEDICINE

## 2025-02-14 PROCEDURE — 1126F AMNT PAIN NOTED NONE PRSNT: CPT | Performed by: NURSE PRACTITIONER

## 2025-02-14 PROCEDURE — 96377 APPLICATON ON-BODY INJECTOR: CPT

## 2025-02-14 PROCEDURE — 96411 CHEMO IV PUSH ADDL DRUG: CPT

## 2025-02-14 PROCEDURE — 85025 COMPLETE CBC W/AUTO DIFF WBC: CPT | Performed by: INTERNAL MEDICINE

## 2025-02-14 PROCEDURE — 25010000002 PEGFILGRASTIM 6 MG/0.6ML PREFILLED SYRINGE KIT: Performed by: INTERNAL MEDICINE

## 2025-02-14 PROCEDURE — 96375 TX/PRO/DX INJ NEW DRUG ADDON: CPT

## 2025-02-14 PROCEDURE — 25010000002 FOSAPREPITANT PER 1 MG: Performed by: INTERNAL MEDICINE

## 2025-02-14 RX ORDER — SODIUM CHLORIDE 9 MG/ML
20 INJECTION, SOLUTION INTRAVENOUS ONCE
Status: DISCONTINUED | OUTPATIENT
Start: 2025-02-14 | End: 2025-02-14

## 2025-02-14 RX ORDER — PALONOSETRON 0.05 MG/ML
0.25 INJECTION, SOLUTION INTRAVENOUS ONCE
Status: COMPLETED | OUTPATIENT
Start: 2025-02-14 | End: 2025-02-14

## 2025-02-14 RX ORDER — SODIUM CHLORIDE 0.9 % (FLUSH) 0.9 %
10 SYRINGE (ML) INJECTION AS NEEDED
Status: DISCONTINUED | OUTPATIENT
Start: 2025-02-14 | End: 2025-02-14 | Stop reason: HOSPADM

## 2025-02-14 RX ORDER — DOXORUBICIN HYDROCHLORIDE 2 MG/ML
60 INJECTION, SOLUTION INTRAVENOUS ONCE
Status: COMPLETED | OUTPATIENT
Start: 2025-02-14 | End: 2025-02-14

## 2025-02-14 RX ORDER — SODIUM CHLORIDE 0.9 % (FLUSH) 0.9 %
10 SYRINGE (ML) INJECTION AS NEEDED
OUTPATIENT
Start: 2025-02-14

## 2025-02-14 RX ORDER — HEPARIN SODIUM (PORCINE) LOCK FLUSH IV SOLN 100 UNIT/ML 100 UNIT/ML
500 SOLUTION INTRAVENOUS AS NEEDED
Status: DISCONTINUED | OUTPATIENT
Start: 2025-02-14 | End: 2025-02-14 | Stop reason: HOSPADM

## 2025-02-14 RX ORDER — HEPARIN SODIUM (PORCINE) LOCK FLUSH IV SOLN 100 UNIT/ML 100 UNIT/ML
500 SOLUTION INTRAVENOUS AS NEEDED
OUTPATIENT
Start: 2025-02-14

## 2025-02-14 RX ADMIN — PALONOSETRON 0.25 MG: 0.25 INJECTION, SOLUTION INTRAVENOUS at 10:10

## 2025-02-14 RX ADMIN — PEGFILGRASTIM 6 MG: KIT SUBCUTANEOUS at 12:17

## 2025-02-14 RX ADMIN — HEPARIN 500 UNITS: 100 SYRINGE at 12:19

## 2025-02-14 RX ADMIN — SODIUM CHLORIDE 1300 MG: 9 INJECTION, SOLUTION INTRAVENOUS at 11:38

## 2025-02-14 RX ADMIN — Medication 10 ML: at 12:19

## 2025-02-14 RX ADMIN — FOSAPREPITANT 150 MG: 150 INJECTION, POWDER, LYOPHILIZED, FOR SOLUTION INTRAVENOUS at 10:26

## 2025-02-14 RX ADMIN — SODIUM CHLORIDE 500 ML: 9 INJECTION, SOLUTION INTRAVENOUS at 10:10

## 2025-02-14 RX ADMIN — DEXAMETHASONE SODIUM PHOSPHATE 12 MG: 4 INJECTION, SOLUTION INTRA-ARTICULAR; INTRALESIONAL; INTRAMUSCULAR; INTRAVENOUS; SOFT TISSUE at 10:12

## 2025-02-14 RX ADMIN — DOXORUBICIN HYDROCHLORIDE 130 MG: 2 INJECTION, SOLUTION INTRAVENOUS at 11:19

## 2025-02-14 NOTE — PROGRESS NOTES
"NAME: Kristyn Goff    : 1950    DATE: 2025    DIAGNOSIS: Malignant neoplasm of upper-outer quadrant of left breast in female, estrogen receptor positive     TREATMENT HISTORY:   Left mastectomy and SLNBx  with Prophylactic Contralateral Mastectomy performed by Dr. Winnie He 24    2.            CHIEF COMPLAINT:  Follow Up Breast Cancer/Toxicity Check    HISTORY OF PRESENT ILLNESS:   Kristyn Goff is a very pleasant 75 y.o. female who is being seen today in the presence of her son and daughter-in-law at the request of Maria Esther He MD for evaluation and treatment of breast cancer.  Ms. Goff says she first noticed a \"knot\" in her left breast about 15 years prior around .  Then it was maybe the size of a marble.  She says it enlarged over time until it was about the size of a lime.  She was seeing her PEP, JOHN Toledo who noticed the lump during an exam and ordered further evaluation.  She had biopsy of two areas in the L breast as well as L axillary LN and this showed invasive moderately differentiated ductal carcinoma with micropapillary features Tumor was >95% 3+ ER+, 81-90% 2+ HI+, HER-2/Estelita - (0).  L axillary LN was involved.  Dr. He took her for L mastectomy w/ SLNBx and prophylactic contralateral mastectomy as below on 24.  Patholgy showed a Stage IIIA multifocal invasive ductal carcinoma of the left breast with micropapillary features. Tumors were 30 mm, 4 mm.  There was associated high grade DCIS.  4/5 LN were involved (3/4 SLN and 1/1 other LN), the largest being 22 mm with extranodal extension.  R breast was without malignancy.    Ms. Goff is recovering reasonably well.  She has healed somewhat slowly and still has L axillary drain in place but she is making steady progress and now says she has minimal output from the axillary drain.  She is to see Dr. He on Wednesday.   She denies weight loss.  No fevers or chills. No chest pain or shortness " "of breath. No abdominal pain, n/v/d/c, no rectal bleeding.  No bony pain. No headaches or visual disturbance.  They do complain that she has developed a \"place\" on her back they are concerned about that they want me to look at today.    Of note, prior to her surgery she had CT imaging as below which showed several small, nonspecific lung nodules in the R lung (5-6 mm).  Of note similar finding was noted on CTPE protocol from 23).  MRI of the brain also showed a 3 mm enhancing lesion in the L frontal bone which could represent hemangioma although metastatic lesion could not be completely excluded.  Bone scan was clear.        INTERVAL HISTORY:  Ms. Goff presents today for follow up of breast cancer and toxicity check. She has completed two cycles of DD AC to date and reports tolerating the treatments well without any noticeable side effects. She reports a good appetite and hydrating well. Denies any nausea/vomiting or constipation/diarrhea. She is without any specific complaints or concerns at this time.             PAST MEDICAL HISTORY:  Past Medical History:   Diagnosis Date    Breast cancer     Elevated cholesterol     GERD (gastroesophageal reflux disease)     High cholesterol    -  H/o Diverticulosis  -  H/o Hyperparathyroidism    Risk Factors:  Age of Menarche: 7 yo  Age of Menopause: around age 54  Prior Breast Disease: Denies prior bx but says this lesion was present for maybe 15 yrs before she sought attention.  Pregnancies:    Age of 1st pregnancy:23  Family History of Breast Cancer: denies  Family History of Ovarian Cancer:Denies  History of HRT use:  Denies       PAST SURGICAL HISTORY:  Past Surgical History:   Procedure Laterality Date    CATARACT EXTRACTION Right     COLONOSCOPY      GALLBLADDER SURGERY      MASTECTOMY Bilateral     PARATHYROIDECTOMY      VENOUS ACCESS DEVICE (PORT) INSERTION Right 2025    Procedure: INSERTION VENOUS ACCESS DEVICE;  Surgeon: Karen Johnston MD;  " Location: [facility] OR;  Service: General;  Laterality: Right;   -  Parathyrodectomy  -  S/p mario cataract surgery w/ implant placement  -  CCU    FAMILY HISTORY:  Family History   Problem Relation Age of Onset    Heart attack Father     Hypertension Sister     Heart attack Sister     Diabetes Sister    -  Denies family h/o malignancy of any kind    SOCIAL HISTORY:  Social History     Socioeconomic History    Marital status:    Tobacco Use    Smoking status: Former     Types: Cigarettes    Smokeless tobacco: Never   Vaping Use    Vaping status: Never Used   Substance and Sexual Activity    Alcohol use: Never    Drug use: Never    Sexual activity: Defer   -  .  Lives alone.  Used to own a flower shop but retired around 2012.  Former smoker, quit around age 35 yo      REVIEW OF SYSTEMS:   A comprehensive 14 point review of systems was performed.  Significant findings as mentioned above.  All other systems reviewed and are negative.      MEDICATIONS:  The current medication list was reviewed in the EMR    Current Outpatient Medications:     atorvastatin (LIPITOR) 20 MG tablet, Take 1 tablet by mouth Daily., Disp: , Rfl:     Carboxymethylcellulose Sodium (EYE DROPS OP), Apply 1 drop to eye(s) as directed by provider 2 (Two) Times a Day., Disp: , Rfl:     ibuprofen (ADVIL,MOTRIN) 600 MG tablet, Take 1 tablet by mouth Every 8 (Eight) Hours As Needed for Mild Pain. Please take on day following chemotherapy prior to growth factor injection., Disp: 30 tablet, Rfl: 0    lidocaine-prilocaine (EMLA) 2.5-2.5 % cream, Apply to port-a-cath site 30 minutes prior to arrival at infusion center. Cover with plastic wrap., Disp: 30 g, Rfl: 1    loratadine (CLARITIN) 10 MG tablet, Take 1 tablet by mouth daily on the day following chemotherapy prior to growth factor injection and continue for 7 days., Disp: 7 tablet, Rfl: 5    OLANZapine (zyPREXA) 5 MG tablet, Take 1 tablet by mouth for 4 doses starting night of chemotherapy.,  "Disp: 8 tablet, Rfl: 1    ondansetron (ZOFRAN) 8 MG tablet, Take 1 tablet by mouth 3 (Three) Times a Day As Needed for Nausea or Vomiting., Disp: 30 tablet, Rfl: 5    prochlorperazine (COMPAZINE) 10 MG tablet, Take 1 tablet by mouth Every 6 (Six) Hours As Needed for Nausea or Vomiting., Disp: 30 tablet, Rfl: 1    vitamin D (ERGOCALCIFEROL) 1.25 MG (18404 UT) capsule capsule, Take 1 capsule by mouth 1 (One) Time Per Week., Disp: 4 capsule, Rfl: 5    hydroCHLOROthiazide 25 MG tablet, Take 1 tablet by mouth Daily. (Patient not taking: Reported on 1/31/2025), Disp: , Rfl:     ALLERGIES:  No Known Allergies    PHYSICAL EXAM:  Vitals:    02/14/25 0851   BP: 137/76   Pulse: (!) 125   Resp: 20   Temp: 98 °F (36.7 °C)   TempSrc: Temporal   SpO2: 95%   Weight: 103 kg (227 lb)   Height: 172.7 cm (68\")   PainSc: 0-No pain         Body surface area is 2.16 meters squared.   Body mass index is 34.52 kg/m².   ECOG score: 0     General:  Awake, alert and oriented, in no distress.  In good spirits.  HEENT:  Pupils are equal, round and reactive to light and accommodation, Extra-ocular movements full, Oropharyx clear, mucous membranes moist  Neck:  No JVD, thyromegaly or lymphadenopathy  CV:  Regular rate and rhythm, no murmurs, rubs or gallops  Resp:  Lungs are clear to auscultation bilaterally, no rhonchi  Breast:  S/p mario mastectomies.  Surgical incisions are smooth and intact without nodularity. No axillary LAD on either side.  Abd:  Soft, non-tender, non-distended, bowel sounds present, no palpable organomegaly or masses  Ext:  No clubbing, cyanosis, no lower extremity edema  Back:  Over the lower L back near the waistline, she previously had an area of cellulitis maybe 8x2.5 cm with induration / thickening and erythema of the skin.  There was some element of abscess formation with purulent drainage.  This is now completely resolved without erythema warmth or fluctuance.  Lymph:  No cervical, supraclavicular, axillary, inguinal " or femoral adenopathy  Neuro:  MS as above, CN II-XII intact, grossly non-focal exam      PATHOLOGY:  8/28/24 11/4/24            ENDOSCOPY:        IMAGING:  Mammo Diagnostic Digital Tomosynthesis Bilateral With CAD (07/19/2024 10:42) & US Breast Left Limited (07/19/2024 11:35)   FINDINGS:    Irregular hypodense mass upper outer left breast, anterior depth, 1-2 o'clock radian, with pleomorphic calcifications, measures 3.9 x 2.4 cm (image 40 MLO, image 37 CC).      Irregular lesion with coarse calcification is seen at left 2-3 o'clock radian, posterior depth measuring 1.7 x 1.5 cm (image 26 MLO, image 23 CC).      Enlarged left axillary lymph nodes are seen.      Benign secretory calcifications are seen bilaterally.      No additional suspicious masses, tissue distortion, or suspicious calcifications are identified.      Ultrasound:      Real time, targeted, technologist performed sonographic imaging of the left breast was performed utilizing a multihertz transducer.       Ultrasound was performed in the left upper outer quadrant and axilla.      --- Lobulated mass is seen at left 11-2 o'clock radian, 4 cm from the nipple, central vascular flow, 5.0 x 2.9 x 3.4 cm. This corresponds to the mammogram.    --- Spiculated lesion at 2-3 o'clock radian, 9 cm from the nipple, measuring 1.7 x 1.0 x 1.1 cm. This corresponds to the distortion seen on mammogram.      Enlarging nodes are seen in the left axilla. The largest node measures 2.0 x 2.5 x 1.3 cm.     IMPRESSION:  1.   No mammographic evidence of malignancy is are seen in the right breast.        2.   Breast masses at left 11-3 o'clock radian. These are highly suspicious for malignant process.    3. Enlarging nodes in the left axilla which are suspicious for metastatic disease.         RECOMMENDED FOLLOWUP: Ultrasound guided biopsy of the large mass at 11-2 o'clock radian, left 2-3 o'clock radian and one of the left axillary lymph nodes.      BI-RADS  category 5: Highly suggestive of malignancy.       MRI Abdomen With & Without Contrast (08/14/2024 14:05)   FINDINGS:      LOWER CHEST: Lung bases are clear. Small hiatal hernia.      LIVER: Normal contours. No mass. Vascular shunt in the dome (for example on bolus phase series, image 119). No steatosis. No intrahepatic biliary dilatation. Portal and hepatic veins are patent.      GALLBLADDER: Removed.      COMMON BILE DUCT: Normal caliber. No stones.       SPLEEN: Within normal limits.      PANCREAS: No mass. No pancreatic fluid collections. The pancreatic duct is normal.      ADRENALS: No masses.      KIDNEYS: No solid left renal lesion identified, to correspond to the abnormality described on recent ultrasound. A parapelvic cyst in the left kidney measuring 2 cm. A couple of small cortical cysts in the right kidney measuring up to 1.3 cm. No hydronephrosis.      LYMPH NODES: No adenopathy.      STOMACH, SMALL BOWEL AND COLON: No bowel wall thickening or obstruction.      PERITONEAL CAVITY: No mesenteric stranding or free fluid.      ABDOMINAL AORTA: No aneurysm.      SOFT TISSUES: Normal.      OSSEOUS STRUCTURES: No acute fracture or destructive lesion.     IMPRESSION:  1.   No solid left renal lesion identified, to correspond to the abnormality described on recent ultrasound. Few bilateral renal cysts, including a parapelvic cyst in the left kidney measuring 2 cm.   2.   Additional noncontributory findings described above.     Mammo Diagnostic Digital Tomosynthesis Left With CAD (08/28/2024 09:46) & US Breast Left Limited (08/28/2024 11:11)   FINDINGS:   Findings: There are multiple suspicious findings in the left breast as listed below:     Finding 1: There is a irregular mass with associated calcifications abutting the skin, measuring mammographically 45 x 42 x 30 mm. Targeted left breast ultrasound at the 1:00 position, 4 cm from the revealed a corresponding sonographic irregular mass with calcifications  measuring sonographically 4.6 x 2.7 x 3 cm. This extends to the skin. Finding is hard Elastography. Finding is highly suspicious.     Finding 2: There is a irregular mass is associated architectural distortion and coarse central calcification as well as fine: calcifications in the approximate 2:00 position of the left breast middle to posterior depth, 13 cm from the nipple. Targeted left breast ultrasound the 2:00 position 13 cm from the reveals an irregular 8 x 6 x 6 mm mass, with adjacent vascularity, intermediate Elastography.   Finding is highly suspicious.     Finding 3: In the 12:00 position of the left breast there is a 45 mm span of fine pleomorphic calcifications with linear/segmental distribution with associated subtle asymmetry best appreciated in the magnified views, full field CC, slice 40 of 79 as well as ML slice 47/91. These are located approximately 8 cm from nipple. Targeted right breast ultrasound in the left breast at the 12:00 position 8 cm from the nipple identifies an irregular mass, soft on Elastography, measuring 14 x 11 x 12 mm, which appears underestimated sonographically compared to mammographic span of 45 mm linear calcifications. Finding is highly suspicious     Finding 4: There are additional small highly suspicious asymmetries with microcalcifications scattered throughout the inferior left breast and lower inner quadrant in the approximate 6:00-9:00 position middle to posterior depth: The total span of these lower inner quadrant asymmetries measures up to 10 x 7 x 6 cm as best appreciated on CC full-field, slice 13 of 79 and ML slice 27 out of 92. Findings are highly suspicious indicating multicentric disease.     Finding 5: There are  2 abnormal lymph nodes in the left axilla corresponding to completely replaced lymph nodes labeled as lymph node1 and 2 on US; these are highly suspicious with no demonstration of fatty hilum. This lymph nodes measure respectively 22 mm and 18 mm each,  adjacent to each other.       Right breast: A repeat right diagnostic mammogram was not performed as review of outside right breast mammogram did not reveal any suspicious areas of concern.     IMPRESSION:  BI-RADS Code: BI-RADS 5, Highly suggestive of malignancy.   Multicentric left breast disease with highly suspicious at least 2 left axillary lymph nodes     RECOMMENDATIONS:   Left Breast Recommendations: Ultrasound Guided Breast Biopsy for findings 1 and 2 to confirm multicentric disease.     Fine Needle Aspiration     Right diagnostic mammogram in one year.     US Axilla Left (09/12/2024 12:02)   FINDINGS:   Final mammographic images demonstrate the localizer tag to be located within the left axillary lymph node and adjacent to the twirl clip.     Left Breast Density: The breast tissue is heterogeneously dense, which may obscure small masses.     IMPRESSION:  Successful localization of the left axillary lymph node with a pink smartclip.     MR Head w and wo IV Contrast (09/25/2024 08:32)   Findings:     No enhancing lesions to suggest intracranial metastatic disease.     No evidence of restricted diffusion to suggest acute territorial infarct.     No evidence of mass effect, midline shift, ration, hydrocephalus.     No acute appearing intracranial hemorrhage.  No extra-axial fluid collections.  Scattered periventricular and subcortical T2/FLAIR hyperintense foci within the supratentorial brain, probably related to small vessel ischemic changes.  Age-appropriate ventricular size and configuration.     Basal cisterns are patent.  Major intracranial flow voids are preserved.     Paranasal sinuses  are clear.  Mastoid cells are clear.  Postsurgical changes of the orbits.     3 mm enhancing lesion within the left frontal bone could be related to hemangioma (series 6 image 20, series 13 image 20), although underlying metastatic disease cannot be totally excluded.  Attention to this area on follow-up imaging.     No  additional calvarial lesions.     Impression:  No abnormal intraparenchymal postcontrast enhancement to suggest intraparenchymal metastatic disease.      No acute intracranial process.  Sequelae of small vessel ischemic changes.     3 mm enhancing lesion within the left frontal bone could be related to hemangioma (series 6 image 20, series 13 image 20), although underlying metastatic disease cannot be totally excluded.  Attention to this area on follow-up imaging.     CT Abdomen Pelvis With Contrast (09/25/2024 12:40)   FINDINGS:   Mediastinum and Pleura: No mediastinal or hilar adenopathy. No pleural or pericardial effusion.     Lungs: 5 mm peripheral right upper lobe nodule, image 49 of series 3. Subpleural 6 mm peripheral right middle lobe nodule, image 58 series 3. Subpleural 6 mm right basal nodule, image 73 of series 3. No acute airspace disease. The central airways are unremarkable.     Abdomen and Pelvis: No focal hepatic lesions. Gallbladder is removed. No biliary dilatation. No splenic lesions. Normal adrenal glands. No pancreatic lesions or peripancreatic stranding. Exophytic right renal cortical cysts. No suspicious renal parenchymal lesions. No renal collecting system dilatation.     Calcifications involving abdominal aorta, bilateral iliac vessels and SMA, with associated moderate to significant stenosis of the distal SMA trunk.     No enlarged lymph nodes. No mesenteric or retroperitoneal nodularity. No free fluid or air within the abdomen or pelvis.     Unremarkable stomach. Normal caliber of small and large bowel. Distal colonic diverticulosis without evidence of complications.     Urinary bladder is within normal limits. No abnormal soft tissue densities in the regions of adnexa.     Musculoskeletal: Right breast is only partially included, without discrete suspicious lesions noted. There is a biopsied lesion within left breast, image 34 of series 2, measures up to 2.1 cm. There is also a left  axillary lymphadenopathy, status post biopsy, with example of left axillary lymph node on image 37 of series 2 measuring up to 1.7 cm.     No aggressive osseous lesions or acute fractures.     IMPRESSION:  Left breast lesion and left axillary lymphadenopathy, representing known breast cancer with metastasis.   Several peripheral right lung nodules, nonspecific.   No enlarged mediastinal lymph nodes.   No suspicious lesions within the abdomen or pelvis.   No suspicious osseous lesions are identified.     CT Chest With Contrast Diagnostic (09/25/2024 12:40)   FINDINGS:   Mediastinum and Pleura: No mediastinal or hilar adenopathy. No pleural or pericardial effusion.     Lungs: 5 mm peripheral right upper lobe nodule, image 49 of series 3. Subpleural 6 mm peripheral right middle lobe nodule, image 58 series 3. Subpleural 6 mm right basal nodule, image 73 of series 3. No acute airspace disease. The central airways are unremarkable.     Abdomen and Pelvis: No focal hepatic lesions. Gallbladder is removed. No biliary dilatation. No splenic lesions. Normal adrenal glands. No pancreatic lesions or peripancreatic stranding. Exophytic right renal cortical cysts. No suspicious renal parenchymal lesions. No renal collecting system dilatation.     Calcifications involving abdominal aorta, bilateral iliac vessels and SMA, with associated moderate to significant stenosis of the distal SMA trunk.     No enlarged lymph nodes. No mesenteric or retroperitoneal nodularity. No free fluid or air within the abdomen or pelvis.     Unremarkable stomach. Normal caliber of small and large bowel. Distal colonic diverticulosis without evidence of complications.     Urinary bladder is within normal limits. No abnormal soft tissue densities in the regions of adnexa.     Musculoskeletal: Right breast is only partially included, without discrete suspicious lesions noted. There is a biopsied lesion within left breast, image 34 of series 2, measures  up to 2.1 cm. There is also a left axillary lymphadenopathy, status post biopsy, with example of left axillary lymph node on image 37 of series 2 measuring up to 1.7 cm.     No aggressive osseous lesions or acute fractures.     IMPRESSION:  Left breast lesion and left axillary lymphadenopathy, representing known breast cancer with metastasis.   Several peripheral right lung nodules, nonspecific.   No enlarged mediastinal lymph nodes.   No suspicious lesions within the abdomen or pelvis.   No suspicious osseous lesions are identified.     NM Bone Scan Whole Body (09/25/2024 13:41)   FINDINGS:   Bones: No sites of focal increased (hot) and decreased (cold) uptake in axial and appendicular skeleton to suggest bone metastasis.     Bones/Joints: Sites of mild uptake consistent with benign arthritic, degenerative or post traumatic changes.     Soft-tissues: Asymmetric mild uptake in the left breast soft tissues relative to the right. Two kidneys visualized.     IMPRESSION:  No evidence of osseous metastatic disease.     Adult Transthoracic Echo Complete W/ Cont if Necessary Per Protocol (01/03/2025 09:52)   Interpretation Summary    Left ventricular systolic function is normal. Estimated left ventricular EF = 55%    Left ventricular wall thickness is consistent with moderate concentric hypertrophy.    Left ventricular diastolic function is consistent with (grade I) impaired relaxation.    Estimated right ventricular systolic pressure from tricuspid regurgitation is normal (<35 mmHg).    RECENT LABS:  Lab Results   Component Value Date    WBC 5.92 01/31/2025    HGB 11.0 (L) 01/31/2025    HCT 34.7 01/31/2025    MCV 92.5 01/31/2025    RDW 13.1 01/31/2025     01/31/2025    NEUTRORELPCT 60.2 01/31/2025    LYMPHORELPCT 20.8 01/31/2025    MONORELPCT 13.7 (H) 01/31/2025    EOSRELPCT 0.3 01/31/2025    BASORELPCT 0.8 01/31/2025    NEUTROABS 3.56 01/31/2025    LYMPHSABS 1.23 01/31/2025       Lab Results   Component Value Date      01/31/2025    K 3.9 01/31/2025    CO2 28.2 01/31/2025     01/31/2025    BUN 18 01/31/2025    CREATININE 1.02 (H) 01/31/2025    GLUCOSE 105 (H) 01/31/2025    CALCIUM 8.4 (L) 01/31/2025    ALKPHOS 130 (H) 01/31/2025    AST 20 01/31/2025    ALT 18 01/31/2025    BILITOT <0.2 01/31/2025    ALBUMIN 3.6 01/31/2025    PROTEINTOT 6.5 01/31/2025    MG 1.9 01/22/2025     Lab Results   Component Value Date    FERRITIN 87.37 11/25/2024    IRON 40 11/25/2024    TIBC 459 11/25/2024    LABIRON 9 (L) 11/25/2024    [id number] 335 11/25/2024    FOLATE 7.32 11/25/2024     Lab Results   Component Value Date    TSH 2.790 11/25/2024     Lab Results   Component Value Date    JLJB52LT 26.3 (L) 11/25/2024     Lab Results   Component Value Date    LABCA2 44.6 (H) 11/25/2024      Lab Results   Component Value Date     37.7 (H) 11/25/2024       ASSESSMENT & PLAN:  Kristyn Goff is a very pleasant 75 y.o. female with Stage IIIA (pT2(m)N2aMX moderately differentiated, multifocal invasive ductal carcinoma with micropapillary features.  Tumors were 30 mm and 4 mm in diameter.  There was associated high grade DCIS.  Surgical margins were clear.  She had involvement of 3/4 SLN and 1/1 other LN.  Largest LN metastasis was 22 mm with extranodal extension.  Tumor was >95% 3+ ER+, 81-90% 2+ DC+, HER-2/Estelita - (0).    1.  Left Breast Cancer:  - Patient and family report that the mass was present for about 15 years before she had it evaluated.  - She is s/p successful L mastectomy w/ clear margins and prophylactic contralateral mastectomy performed by Dr. Winnie He 11/4/24.  - She is now well-healed from her surgery.  - Baseline echocardiogram performed 1/3/2025 showed low normal LVEF 55%.  - Dr. Tellez recommended adjuvant chemotherapy to consist of DDAC x 4 with growth factor support followed by weekly Taxol x 12. We discussed potential risks benefits and side effects and she decided to proceed.  - She received her first cycle  of dose dense AC chemotherapy on 1/16/2025.  Unfortunately shortly thereafter she came down with norovirus infection with prolific diarrhea.  This resolved.  She was able to tolerate C2 DD AC well without any noticeable side effects.   - Exam and labs from today without cause for concern. Will proceed with C3 DD AC today as planned.   - She will follow up with MD as scheduled on day of C4 DD AC with treatment labs.   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  - Following adjuvant radiation she will require hormonal therapy.  Will plan to get DEXA closer to that time.      2.  Abscess L back:  - Resolved.    3.  Abnormal imaging findings:  -  She had CT CAP performed 9/25/24 which showed several tiny, nonspecific lung nodules in the R lung measuring 5-6 mm.  These were previously described on CTPE imaging 4/30/23.  Will need f/u CT imaging after an interval.  -  She also had 3 mm enhancing lesion in the L frontal bone noted on Brain MRI 9/25/24.  Could be c/w hemangioma.  Could not completely r/o metastatic lesion.  Bone scan 9/25/24 was negative.     -  Will plan to reimage after an interval.    4.  Diarrhea  - Turned out to be related to a norovirus infection from which she is now well recovered.  Diarrhea has resolved.      5.  Prophylaxis:  -  Kristyn JONES Samples did not receive 2024 flu vaccine.  This was recommended but declined.  -  Kristyn L Samples did not receive Prevnar vaccine.  This was recommended but declined.  -  Kristyn L Samples did not receive COVID vaccine.  This was recommended but declined.      6. Follow Up:  - Will proceed with C3 DD AC today as planned.   - With MD as scheduled on day of C4 DD AC with treatment labs.         ACO / DANK/Other  Quality measures  -  Kristyn L Samples did not receive 2024 flu vaccine.  This was recommended.  -  Kristyn L Samples reports a pain score of 0.   -  Current outpatient and discharge medications  have been reconciled for the patient.  Reviewed by: JOHN Morgan        I spent 30 minutes caring for Kristyn on this date of service. This time includes time spent by me in the following activities: preparing for the visit, reviewing tests, performing a medically appropriate examination and/or evaluation, counseling and educating the patient/family/caregiver, referring and communicating with other health care professionals, documenting information in the medical record, independently interpreting results and communicating that information with the patient/family/caregiver, care coordination, obtaining a separately obtained history, and reviewing a separately obtained history.                 Electronically Signed by: JOHN Caballero       CC:     JOHN Toledo DO Veronica Morgan Jones, MD

## 2025-02-14 NOTE — PROGRESS NOTES
Venipuncture Blood Specimen Collection  Venipuncture performed in left arm by Stephany Juarez MA with good hemostasis. Patient tolerated the procedure well without complications.   02/14/25   Stephany Juarez MA

## 2025-02-28 ENCOUNTER — OFFICE VISIT (OUTPATIENT)
Dept: ONCOLOGY | Facility: CLINIC | Age: 75
End: 2025-02-28
Payer: MEDICARE

## 2025-02-28 ENCOUNTER — LAB (OUTPATIENT)
Dept: ONCOLOGY | Facility: CLINIC | Age: 75
End: 2025-02-28
Payer: MEDICARE

## 2025-02-28 ENCOUNTER — INFUSION (OUTPATIENT)
Dept: ONCOLOGY | Facility: HOSPITAL | Age: 75
End: 2025-02-28
Payer: MEDICARE

## 2025-02-28 ENCOUNTER — PATIENT OUTREACH (OUTPATIENT)
Dept: ONCOLOGY | Facility: CLINIC | Age: 75
End: 2025-02-28
Payer: MEDICARE

## 2025-02-28 VITALS
HEIGHT: 68 IN | DIASTOLIC BLOOD PRESSURE: 76 MMHG | BODY MASS INDEX: 33.98 KG/M2 | WEIGHT: 224.2 LBS | RESPIRATION RATE: 20 BRPM | SYSTOLIC BLOOD PRESSURE: 143 MMHG | TEMPERATURE: 97.9 F | OXYGEN SATURATION: 97 % | HEART RATE: 116 BPM

## 2025-02-28 VITALS
OXYGEN SATURATION: 97 % | RESPIRATION RATE: 16 BRPM | DIASTOLIC BLOOD PRESSURE: 63 MMHG | SYSTOLIC BLOOD PRESSURE: 119 MMHG | TEMPERATURE: 97.6 F | HEART RATE: 106 BPM

## 2025-02-28 DIAGNOSIS — T45.1X5A CHEMOTHERAPY-INDUCED NAUSEA: ICD-10-CM

## 2025-02-28 DIAGNOSIS — L02.212 ABSCESS OF BACK: ICD-10-CM

## 2025-02-28 DIAGNOSIS — D64.9 NORMOCYTIC ANEMIA: ICD-10-CM

## 2025-02-28 DIAGNOSIS — C50.812 MALIGNANT NEOPLASM OF OVERLAPPING SITES OF LEFT BREAST IN FEMALE, ESTROGEN RECEPTOR POSITIVE: ICD-10-CM

## 2025-02-28 DIAGNOSIS — C50.812 MALIGNANT NEOPLASM OF OVERLAPPING SITES OF LEFT BREAST IN FEMALE, ESTROGEN RECEPTOR POSITIVE: Primary | ICD-10-CM

## 2025-02-28 DIAGNOSIS — Z17.0 MALIGNANT NEOPLASM OF OVERLAPPING SITES OF LEFT BREAST IN FEMALE, ESTROGEN RECEPTOR POSITIVE: Primary | ICD-10-CM

## 2025-02-28 DIAGNOSIS — R53.1 GENERALIZED WEAKNESS: ICD-10-CM

## 2025-02-28 DIAGNOSIS — Z17.0 MALIGNANT NEOPLASM OF OVERLAPPING SITES OF LEFT BREAST IN FEMALE, ESTROGEN RECEPTOR POSITIVE: ICD-10-CM

## 2025-02-28 DIAGNOSIS — R11.0 CHEMOTHERAPY-INDUCED NAUSEA: ICD-10-CM

## 2025-02-28 LAB
ALBUMIN SERPL-MCNC: 3.9 G/DL (ref 3.5–5.2)
ALBUMIN/GLOB SERPL: 1.6 G/DL
ALP SERPL-CCNC: 133 U/L (ref 39–117)
ALT SERPL W P-5'-P-CCNC: 20 U/L (ref 1–33)
ANION GAP SERPL CALCULATED.3IONS-SCNC: 13.6 MMOL/L (ref 5–15)
AST SERPL-CCNC: 26 U/L (ref 1–32)
BASOPHILS # BLD AUTO: 0.07 10*3/MM3 (ref 0–0.2)
BASOPHILS NFR BLD AUTO: 1 % (ref 0–1.5)
BILIRUB SERPL-MCNC: 0.2 MG/DL (ref 0–1.2)
BUN SERPL-MCNC: 14 MG/DL (ref 8–23)
BUN/CREAT SERPL: 13.3 (ref 7–25)
CALCIUM SPEC-SCNC: 9.3 MG/DL (ref 8.6–10.5)
CHLORIDE SERPL-SCNC: 100 MMOL/L (ref 98–107)
CO2 SERPL-SCNC: 24.4 MMOL/L (ref 22–29)
CREAT SERPL-MCNC: 1.05 MG/DL (ref 0.57–1)
DEPRECATED RDW RBC AUTO: 45.3 FL (ref 37–54)
EGFRCR SERPLBLD CKD-EPI 2021: 55.2 ML/MIN/1.73
EOSINOPHIL # BLD AUTO: 0.02 10*3/MM3 (ref 0–0.4)
EOSINOPHIL NFR BLD AUTO: 0.3 % (ref 0.3–6.2)
ERYTHROCYTE [DISTWIDTH] IN BLOOD BY AUTOMATED COUNT: 14.2 % (ref 12.3–15.4)
FERRITIN SERPL-MCNC: 384.2 NG/ML (ref 13–150)
GLOBULIN UR ELPH-MCNC: 2.5 GM/DL
GLUCOSE SERPL-MCNC: 132 MG/DL (ref 65–99)
HCT VFR BLD AUTO: 30.8 % (ref 34–46.6)
HGB BLD-MCNC: 9.7 G/DL (ref 12–15.9)
IMM GRANULOCYTES # BLD AUTO: 0.24 10*3/MM3 (ref 0–0.05)
IMM GRANULOCYTES NFR BLD AUTO: 3.5 % (ref 0–0.5)
IRON 24H UR-MRATE: 92 MCG/DL (ref 37–145)
IRON SATN MFR SERPL: 26 % (ref 20–50)
LYMPHOCYTES # BLD AUTO: 0.56 10*3/MM3 (ref 0.7–3.1)
LYMPHOCYTES NFR BLD AUTO: 8.1 % (ref 19.6–45.3)
MCH RBC QN AUTO: 29 PG (ref 26.6–33)
MCHC RBC AUTO-ENTMCNC: 31.5 G/DL (ref 31.5–35.7)
MCV RBC AUTO: 91.9 FL (ref 79–97)
MONOCYTES # BLD AUTO: 0.81 10*3/MM3 (ref 0.1–0.9)
MONOCYTES NFR BLD AUTO: 11.7 % (ref 5–12)
NEUTROPHILS NFR BLD AUTO: 5.23 10*3/MM3 (ref 1.7–7)
NEUTROPHILS NFR BLD AUTO: 75.4 % (ref 42.7–76)
NRBC BLD AUTO-RTO: 0.6 /100 WBC (ref 0–0.2)
PLATELET # BLD AUTO: 188 10*3/MM3 (ref 140–450)
PMV BLD AUTO: 10.5 FL (ref 6–12)
POTASSIUM SERPL-SCNC: 4 MMOL/L (ref 3.5–5.2)
PROT SERPL-MCNC: 6.4 G/DL (ref 6–8.5)
RBC # BLD AUTO: 3.35 10*6/MM3 (ref 3.77–5.28)
SODIUM SERPL-SCNC: 138 MMOL/L (ref 136–145)
TIBC SERPL-MCNC: 352 MCG/DL (ref 298–536)
TRANSFERRIN SERPL-MCNC: 236 MG/DL (ref 200–360)
WBC NRBC COR # BLD AUTO: 6.93 10*3/MM3 (ref 3.4–10.8)

## 2025-02-28 PROCEDURE — 25810000003 SODIUM CHLORIDE 0.9 % SOLUTION: Performed by: NURSE PRACTITIONER

## 2025-02-28 PROCEDURE — 25010000002 HEPARIN LOCK FLUSH PER 10 UNITS

## 2025-02-28 PROCEDURE — 96413 CHEMO IV INFUSION 1 HR: CPT

## 2025-02-28 PROCEDURE — 96377 APPLICATON ON-BODY INJECTOR: CPT

## 2025-02-28 PROCEDURE — 25010000002 CYCLOPHOSPHAMIDE 1 GM/5ML SOLUTION 5 ML VIAL: Performed by: NURSE PRACTITIONER

## 2025-02-28 PROCEDURE — 25010000002 FOSAPREPITANT PER 1 MG: Performed by: NURSE PRACTITIONER

## 2025-02-28 PROCEDURE — 25010000002 PALONOSETRON 0.25 MG/5ML SOLUTION PREFILLED SYRINGE: Performed by: NURSE PRACTITIONER

## 2025-02-28 PROCEDURE — 25010000002 PEGFILGRASTIM 6 MG/0.6ML PREFILLED SYRINGE KIT: Performed by: NURSE PRACTITIONER

## 2025-02-28 PROCEDURE — 85025 COMPLETE CBC W/AUTO DIFF WBC: CPT | Performed by: INTERNAL MEDICINE

## 2025-02-28 PROCEDURE — 82728 ASSAY OF FERRITIN: CPT | Performed by: INTERNAL MEDICINE

## 2025-02-28 PROCEDURE — 80053 COMPREHEN METABOLIC PANEL: CPT | Performed by: INTERNAL MEDICINE

## 2025-02-28 PROCEDURE — 83540 ASSAY OF IRON: CPT | Performed by: INTERNAL MEDICINE

## 2025-02-28 PROCEDURE — 96367 TX/PROPH/DG ADDL SEQ IV INF: CPT

## 2025-02-28 PROCEDURE — 84466 ASSAY OF TRANSFERRIN: CPT | Performed by: INTERNAL MEDICINE

## 2025-02-28 PROCEDURE — 25010000002 DEXAMETHASONE SODIUM PHOSPHATE 20 MG/5ML SOLUTION: Performed by: NURSE PRACTITIONER

## 2025-02-28 PROCEDURE — 96375 TX/PRO/DX INJ NEW DRUG ADDON: CPT

## 2025-02-28 PROCEDURE — 25010000002 DOXORUBICIN PER 10 MG: Performed by: NURSE PRACTITIONER

## 2025-02-28 PROCEDURE — 25810000003 SODIUM CHLORIDE 0.9 % SOLUTION 250 ML FLEX CONT: Performed by: NURSE PRACTITIONER

## 2025-02-28 PROCEDURE — 96411 CHEMO IV PUSH ADDL DRUG: CPT

## 2025-02-28 RX ORDER — SODIUM CHLORIDE 0.9 % (FLUSH) 0.9 %
10 SYRINGE (ML) INJECTION AS NEEDED
Status: DISCONTINUED | OUTPATIENT
Start: 2025-02-28 | End: 2025-02-28 | Stop reason: HOSPADM

## 2025-02-28 RX ORDER — HEPARIN SODIUM (PORCINE) LOCK FLUSH IV SOLN 100 UNIT/ML 100 UNIT/ML
500 SOLUTION INTRAVENOUS AS NEEDED
Status: DISCONTINUED | OUTPATIENT
Start: 2025-02-28 | End: 2025-02-28 | Stop reason: HOSPADM

## 2025-02-28 RX ORDER — SODIUM CHLORIDE 0.9 % (FLUSH) 0.9 %
10 SYRINGE (ML) INJECTION AS NEEDED
OUTPATIENT
Start: 2025-02-28

## 2025-02-28 RX ORDER — DOXORUBICIN HYDROCHLORIDE 2 MG/ML
130 INJECTION, SOLUTION INTRAVENOUS ONCE
Status: COMPLETED | OUTPATIENT
Start: 2025-02-28 | End: 2025-02-28

## 2025-02-28 RX ORDER — PALONOSETRON 0.05 MG/ML
0.25 INJECTION, SOLUTION INTRAVENOUS ONCE
Status: COMPLETED | OUTPATIENT
Start: 2025-02-28 | End: 2025-02-28

## 2025-02-28 RX ORDER — DOXORUBICIN HYDROCHLORIDE 2 MG/ML
60 INJECTION, SOLUTION INTRAVENOUS ONCE
Status: CANCELLED | OUTPATIENT
Start: 2025-02-28

## 2025-02-28 RX ORDER — HEPARIN SODIUM (PORCINE) LOCK FLUSH IV SOLN 100 UNIT/ML 100 UNIT/ML
500 SOLUTION INTRAVENOUS AS NEEDED
OUTPATIENT
Start: 2025-02-28

## 2025-02-28 RX ORDER — PALONOSETRON 0.05 MG/ML
0.25 INJECTION, SOLUTION INTRAVENOUS ONCE
Status: CANCELLED | OUTPATIENT
Start: 2025-02-28

## 2025-02-28 RX ORDER — SODIUM CHLORIDE 9 MG/ML
20 INJECTION, SOLUTION INTRAVENOUS ONCE
Status: COMPLETED | OUTPATIENT
Start: 2025-02-28 | End: 2025-02-28

## 2025-02-28 RX ORDER — SODIUM CHLORIDE 9 MG/ML
20 INJECTION, SOLUTION INTRAVENOUS ONCE
Status: CANCELLED | OUTPATIENT
Start: 2025-02-28

## 2025-02-28 RX ADMIN — PALONOSETRON 0.25 MG: 0.25 INJECTION, SOLUTION INTRAVENOUS at 09:57

## 2025-02-28 RX ADMIN — Medication 10 ML: at 12:05

## 2025-02-28 RX ADMIN — DOXORUBICIN HYDROCHLORIDE 130 MG: 2 INJECTION, SOLUTION INTRAVENOUS at 10:58

## 2025-02-28 RX ADMIN — HEPARIN 500 UNITS: 100 SYRINGE at 12:05

## 2025-02-28 RX ADMIN — SODIUM CHLORIDE 20 ML/HR: 9 INJECTION, SOLUTION INTRAVENOUS at 09:55

## 2025-02-28 RX ADMIN — CYCLOPHOSPHAMIDE 1300 MG: 1 INJECTION INTRAVENOUS at 11:22

## 2025-02-28 RX ADMIN — FOSAPREPITANT 150 MG: 150 INJECTION, POWDER, LYOPHILIZED, FOR SOLUTION INTRAVENOUS at 10:14

## 2025-02-28 RX ADMIN — PEGFILGRASTIM 6 MG: KIT SUBCUTANEOUS at 12:03

## 2025-02-28 RX ADMIN — DEXAMETHASONE SODIUM PHOSPHATE 12 MG: 4 INJECTION, SOLUTION INTRA-ARTICULAR; INTRALESIONAL; INTRAMUSCULAR; INTRAVENOUS; SOFT TISSUE at 09:58

## 2025-02-28 NOTE — SIGNIFICANT NOTE
Met with patient during chemo infusion. Spent time talking with patient and gave small gift bag. NN contact information was given and encouraged patient to call with any questions or concerns.

## 2025-02-28 NOTE — PROGRESS NOTES
Venipuncture Blood Specimen Collection  Venipuncture performed in right arm by Carlee Graham MA with good hemostasis. Patient tolerated the procedure well without complications.   02/28/25   Carlee Graham MA

## 2025-02-28 NOTE — PROGRESS NOTES
"NAME: Kristyn Goff    : 1950    DATE: 2025    DIAGNOSIS: Malignant neoplasm of upper-outer quadrant of left breast in female, estrogen receptor positive     TREATMENT HISTORY:   Left mastectomy and SLNBx  with Prophylactic Contralateral Mastectomy performed by Dr. Winnie He 24    2.            CHIEF COMPLAINT:  Follow Up Breast Cancer/Toxicity Check    HISTORY OF PRESENT ILLNESS:   Kristyn Goff is a very pleasant 75 y.o. female who is being seen today in the presence of her son and daughter-in-law at the request of Maria Esther He MD for evaluation and treatment of breast cancer.  Ms. Goff says she first noticed a \"knot\" in her left breast about 15 years prior around .  Then it was maybe the size of a marble.  She says it enlarged over time until it was about the size of a lime.  She was seeing her PEP, JOHN Toledo who noticed the lump during an exam and ordered further evaluation.  She had biopsy of two areas in the L breast as well as L axillary LN and this showed invasive moderately differentiated ductal carcinoma with micropapillary features Tumor was >95% 3+ ER+, 81-90% 2+ CO+, HER-2/Estelita - (0).  L axillary LN was involved.  Dr. He took her for L mastectomy w/ SLNBx and prophylactic contralateral mastectomy as below on 24.  Patholgy showed a Stage IIIA multifocal invasive ductal carcinoma of the left breast with micropapillary features. Tumors were 30 mm, 4 mm.  There was associated high grade DCIS.  4/5 LN were involved (3/4 SLN and 1/1 other LN), the largest being 22 mm with extranodal extension.  R breast was without malignancy.    Ms. Goff is recovering reasonably well.  She has healed somewhat slowly and still has L axillary drain in place but she is making steady progress and now says she has minimal output from the axillary drain.  She is to see Dr. He on Wednesday.   She denies weight loss.  No fevers or chills. No chest pain or shortness " "of breath. No abdominal pain, n/v/d/c, no rectal bleeding.  No bony pain. No headaches or visual disturbance.  They do complain that she has developed a \"place\" on her back they are concerned about that they want me to look at today.    Of note, prior to her surgery she had CT imaging as below which showed several small, nonspecific lung nodules in the R lung (5-6 mm).  Of note similar finding was noted on CTPE protocol from 23).  MRI of the brain also showed a 3 mm enhancing lesion in the L frontal bone which could represent hemangioma although metastatic lesion could not be completely excluded.  Bone scan was clear.        INTERVAL HISTORY:  Ms. Goff presents today accompanied by a friend for follow up of breast cancer and toxicity check. Since her last visit, she states that she has been well. She states that she is having increased fatigue/generalized weakness as time goes on. She states that she feels \"blah\" at times. She reports intermittent episodes of nausea that are well controlled with antiemetics. She denies vomiting, diarrhea or constipation. She states that foods do not have a taste anymore and notes a decreased appetite. She denies infectious symptoms. She is otherwise without specific complaints today.         PAST MEDICAL HISTORY:  Past Medical History:   Diagnosis Date    Breast cancer     Elevated cholesterol     GERD (gastroesophageal reflux disease)     High cholesterol    -  H/o Diverticulosis  -  H/o Hyperparathyroidism    Risk Factors:  Age of Menarche: 7 yo  Age of Menopause: around age 54  Prior Breast Disease: Denies prior bx but says this lesion was present for maybe 15 yrs before she sought attention.  Pregnancies:    Age of 1st pregnancy:23  Family History of Breast Cancer: denies  Family History of Ovarian Cancer:Denies  History of HRT use:  Denies       PAST SURGICAL HISTORY:  Past Surgical History:   Procedure Laterality Date    CATARACT EXTRACTION Right     COLONOSCOPY   "    GALLBLADDER SURGERY      MASTECTOMY Bilateral     PARATHYROIDECTOMY      VENOUS ACCESS DEVICE (PORT) INSERTION Right 1/13/2025    Procedure: INSERTION VENOUS ACCESS DEVICE;  Surgeon: Karen Johnston MD;  Location: [facility];  Service: General;  Laterality: Right;   -  Parathyrodectomy  -  S/p mario cataract surgery w/ implant placement  -  CCU    FAMILY HISTORY:  Family History   Problem Relation Age of Onset    Heart attack Father     Hypertension Sister     Heart attack Sister     Diabetes Sister    -  Denies family h/o malignancy of any kind    SOCIAL HISTORY:  Social History     Socioeconomic History    Marital status:    Tobacco Use    Smoking status: Former     Types: Cigarettes    Smokeless tobacco: Never   Vaping Use    Vaping status: Never Used   Substance and Sexual Activity    Alcohol use: Never    Drug use: Never    Sexual activity: Defer   -  .  Lives alone.  Used to own a flower shop but retired around 2012.  Former smoker, quit around age 33 yo      REVIEW OF SYSTEMS:   A comprehensive 14 point review of systems was performed.  Significant findings as mentioned above.  All other systems reviewed and are negative.      MEDICATIONS:  The current medication list was reviewed in the EMR    Current Outpatient Medications:     atorvastatin (LIPITOR) 20 MG tablet, Take 1 tablet by mouth Daily., Disp: , Rfl:     Carboxymethylcellulose Sodium (EYE DROPS OP), Apply 1 drop to eye(s) as directed by provider 2 (Two) Times a Day., Disp: , Rfl:     ibuprofen (ADVIL,MOTRIN) 600 MG tablet, Take 1 tablet by mouth Every 8 (Eight) Hours As Needed for Mild Pain. Please take on day following chemotherapy prior to growth factor injection., Disp: 30 tablet, Rfl: 0    lidocaine-prilocaine (EMLA) 2.5-2.5 % cream, Apply to port-a-cath site 30 minutes prior to arrival at infusion center. Cover with plastic wrap., Disp: 30 g, Rfl: 1    loratadine (CLARITIN) 10 MG tablet, Take 1 tablet by mouth daily on the day  "following chemotherapy prior to growth factor injection and continue for 7 days., Disp: 7 tablet, Rfl: 5    ondansetron (ZOFRAN) 8 MG tablet, Take 1 tablet by mouth 3 (Three) Times a Day As Needed for Nausea or Vomiting., Disp: 30 tablet, Rfl: 5    prochlorperazine (COMPAZINE) 10 MG tablet, Take 1 tablet by mouth Every 6 (Six) Hours As Needed for Nausea or Vomiting., Disp: 30 tablet, Rfl: 1    vitamin D (ERGOCALCIFEROL) 1.25 MG (52618 UT) capsule capsule, Take 1 capsule by mouth 1 (One) Time Per Week., Disp: 4 capsule, Rfl: 5    hydroCHLOROthiazide 25 MG tablet, Take 1 tablet by mouth Daily. (Patient not taking: Reported on 2/28/2025), Disp: , Rfl:     OLANZapine (zyPREXA) 5 MG tablet, Take 1 tablet by mouth for 4 doses starting night of chemotherapy., Disp: 8 tablet, Rfl: 1  No current facility-administered medications for this visit.    Facility-Administered Medications Ordered in Other Visits:     heparin injection 500 Units, 500 Units, Intravenous, PRN, Griffie, Karen Maddy, APRN, 500 Units at 02/28/25 1205    sodium chloride 0.9 % flush 10 mL, 10 mL, Intravenous, PRN, Griffie, Karen Maddy, APRN, 10 mL at 02/28/25 1205    ALLERGIES:  No Known Allergies    PHYSICAL EXAM:  Vitals:    02/28/25 0840   BP: 143/76   Pulse: 116   Resp: 20   Temp: 97.9 °F (36.6 °C)   TempSrc: Temporal   SpO2: 97%   Weight: 102 kg (224 lb 3.2 oz)   Height: 172.7 cm (67.99\")   PainSc: 0-No pain         Body surface area is 2.15 meters squared.   Body mass index is 34.1 kg/m².   ECOG score: 0     General:  Awake, alert and oriented, in no distress.  In good spirits.  HEENT:  Pupils are equal, round and reactive to light and accommodation, Extra-ocular movements full, Oropharyx clear, mucous membranes moist  Neck:  No JVD, thyromegaly or lymphadenopathy  CV:  Regular rate and rhythm, no murmurs, rubs or gallops  Resp:  Lungs are clear to auscultation bilaterally, no rhonchi  Breast:  S/p mario mastectomies.  Surgical incisions are smooth " and intact without nodularity. No axillary LAD on either side.  Abd:  Soft, non-tender, non-distended, bowel sounds present, no palpable organomegaly or masses  Ext:  No clubbing, cyanosis, no lower extremity edema  Back:  Over the lower L back near the waistline, she previously had an area of cellulitis maybe 8x2.5 cm with induration / thickening and erythema of the skin.  There was some element of abscess formation with purulent drainage.  This is now completely resolved without erythema warmth or fluctuance.  Lymph:  No cervical, supraclavicular, axillary, inguinal or femoral adenopathy  Neuro:  MS as above, CN II-XII intact, grossly non-focal exam      PATHOLOGY:  8/28/24 11/4/24            ENDOSCOPY:        IMAGING:  Mammo Diagnostic Digital Tomosynthesis Bilateral With CAD (07/19/2024 10:42) & US Breast Left Limited (07/19/2024 11:35)   FINDINGS:    Irregular hypodense mass upper outer left breast, anterior depth, 1-2 o'clock radian, with pleomorphic calcifications, measures 3.9 x 2.4 cm (image 40 MLO, image 37 CC).      Irregular lesion with coarse calcification is seen at left 2-3 o'clock radian, posterior depth measuring 1.7 x 1.5 cm (image 26 MLO, image 23 CC).      Enlarged left axillary lymph nodes are seen.      Benign secretory calcifications are seen bilaterally.      No additional suspicious masses, tissue distortion, or suspicious calcifications are identified.      Ultrasound:      Real time, targeted, technologist performed sonographic imaging of the left breast was performed utilizing a multihertz transducer.       Ultrasound was performed in the left upper outer quadrant and axilla.      --- Lobulated mass is seen at left 11-2 o'clock radian, 4 cm from the nipple, central vascular flow, 5.0 x 2.9 x 3.4 cm. This corresponds to the mammogram.    --- Spiculated lesion at 2-3 o'clock radian, 9 cm from the nipple, measuring 1.7 x 1.0 x 1.1 cm. This corresponds to the distortion seen on  mammogram.      Enlarging nodes are seen in the left axilla. The largest node measures 2.0 x 2.5 x 1.3 cm.     IMPRESSION:  1.   No mammographic evidence of malignancy is are seen in the right breast.        2.   Breast masses at left 11-3 o'clock radian. These are highly suspicious for malignant process.    3. Enlarging nodes in the left axilla which are suspicious for metastatic disease.         RECOMMENDED FOLLOWUP: Ultrasound guided biopsy of the large mass at 11-2 o'clock radian, left 2-3 o'clock radian and one of the left axillary lymph nodes.      BI-RADS category 5: Highly suggestive of malignancy.       MRI Abdomen With & Without Contrast (08/14/2024 14:05)   FINDINGS:      LOWER CHEST: Lung bases are clear. Small hiatal hernia.      LIVER: Normal contours. No mass. Vascular shunt in the dome (for example on bolus phase series, image 119). No steatosis. No intrahepatic biliary dilatation. Portal and hepatic veins are patent.      GALLBLADDER: Removed.      COMMON BILE DUCT: Normal caliber. No stones.       SPLEEN: Within normal limits.      PANCREAS: No mass. No pancreatic fluid collections. The pancreatic duct is normal.      ADRENALS: No masses.      KIDNEYS: No solid left renal lesion identified, to correspond to the abnormality described on recent ultrasound. A parapelvic cyst in the left kidney measuring 2 cm. A couple of small cortical cysts in the right kidney measuring up to 1.3 cm. No hydronephrosis.      LYMPH NODES: No adenopathy.      STOMACH, SMALL BOWEL AND COLON: No bowel wall thickening or obstruction.      PERITONEAL CAVITY: No mesenteric stranding or free fluid.      ABDOMINAL AORTA: No aneurysm.      SOFT TISSUES: Normal.      OSSEOUS STRUCTURES: No acute fracture or destructive lesion.     IMPRESSION:  1.   No solid left renal lesion identified, to correspond to the abnormality described on recent ultrasound. Few bilateral renal cysts, including a parapelvic cyst in the left kidney  measuring 2 cm.   2.   Additional noncontributory findings described above.     Mammo Diagnostic Digital Tomosynthesis Left With CAD (08/28/2024 09:46) & US Breast Left Limited (08/28/2024 11:11)   FINDINGS:   Findings: There are multiple suspicious findings in the left breast as listed below:     Finding 1: There is a irregular mass with associated calcifications abutting the skin, measuring mammographically 45 x 42 x 30 mm. Targeted left breast ultrasound at the 1:00 position, 4 cm from the revealed a corresponding sonographic irregular mass with calcifications measuring sonographically 4.6 x 2.7 x 3 cm. This extends to the skin. Finding is hard Elastography. Finding is highly suspicious.     Finding 2: There is a irregular mass is associated architectural distortion and coarse central calcification as well as fine: calcifications in the approximate 2:00 position of the left breast middle to posterior depth, 13 cm from the nipple. Targeted left breast ultrasound the 2:00 position 13 cm from the reveals an irregular 8 x 6 x 6 mm mass, with adjacent vascularity, intermediate Elastography.   Finding is highly suspicious.     Finding 3: In the 12:00 position of the left breast there is a 45 mm span of fine pleomorphic calcifications with linear/segmental distribution with associated subtle asymmetry best appreciated in the magnified views, full field CC, slice 40 of 79 as well as ML slice 47/91. These are located approximately 8 cm from nipple. Targeted right breast ultrasound in the left breast at the 12:00 position 8 cm from the nipple identifies an irregular mass, soft on Elastography, measuring 14 x 11 x 12 mm, which appears underestimated sonographically compared to mammographic span of 45 mm linear calcifications. Finding is highly suspicious     Finding 4: There are additional small highly suspicious asymmetries with microcalcifications scattered throughout the inferior left breast and lower inner quadrant in  the approximate 6:00-9:00 position middle to posterior depth: The total span of these lower inner quadrant asymmetries measures up to 10 x 7 x 6 cm as best appreciated on CC full-field, slice 13 of 79 and ML slice 27 out of 92. Findings are highly suspicious indicating multicentric disease.     Finding 5: There are  2 abnormal lymph nodes in the left axilla corresponding to completely replaced lymph nodes labeled as lymph node1 and 2 on US; these are highly suspicious with no demonstration of fatty hilum. This lymph nodes measure respectively 22 mm and 18 mm each, adjacent to each other.       Right breast: A repeat right diagnostic mammogram was not performed as review of outside right breast mammogram did not reveal any suspicious areas of concern.     IMPRESSION:  BI-RADS Code: BI-RADS 5, Highly suggestive of malignancy.   Multicentric left breast disease with highly suspicious at least 2 left axillary lymph nodes     RECOMMENDATIONS:   Left Breast Recommendations: Ultrasound Guided Breast Biopsy for findings 1 and 2 to confirm multicentric disease.     Fine Needle Aspiration     Right diagnostic mammogram in one year.     US Axilla Left (09/12/2024 12:02)   FINDINGS:   Final mammographic images demonstrate the localizer tag to be located within the left axillary lymph node and adjacent to the twirl clip.     Left Breast Density: The breast tissue is heterogeneously dense, which may obscure small masses.     IMPRESSION:  Successful localization of the left axillary lymph node with a pink smartclip.     MR Head w and wo IV Contrast (09/25/2024 08:32)   Findings:     No enhancing lesions to suggest intracranial metastatic disease.     No evidence of restricted diffusion to suggest acute territorial infarct.     No evidence of mass effect, midline shift, ration, hydrocephalus.     No acute appearing intracranial hemorrhage.  No extra-axial fluid collections.  Scattered periventricular and subcortical T2/FLAIR  hyperintense foci within the supratentorial brain, probably related to small vessel ischemic changes.  Age-appropriate ventricular size and configuration.     Basal cisterns are patent.  Major intracranial flow voids are preserved.     Paranasal sinuses  are clear.  Mastoid cells are clear.  Postsurgical changes of the orbits.     3 mm enhancing lesion within the left frontal bone could be related to hemangioma (series 6 image 20, series 13 image 20), although underlying metastatic disease cannot be totally excluded.  Attention to this area on follow-up imaging.     No additional calvarial lesions.     Impression:  No abnormal intraparenchymal postcontrast enhancement to suggest intraparenchymal metastatic disease.      No acute intracranial process.  Sequelae of small vessel ischemic changes.     3 mm enhancing lesion within the left frontal bone could be related to hemangioma (series 6 image 20, series 13 image 20), although underlying metastatic disease cannot be totally excluded.  Attention to this area on follow-up imaging.     CT Abdomen Pelvis With Contrast (09/25/2024 12:40)   FINDINGS:   Mediastinum and Pleura: No mediastinal or hilar adenopathy. No pleural or pericardial effusion.     Lungs: 5 mm peripheral right upper lobe nodule, image 49 of series 3. Subpleural 6 mm peripheral right middle lobe nodule, image 58 series 3. Subpleural 6 mm right basal nodule, image 73 of series 3. No acute airspace disease. The central airways are unremarkable.     Abdomen and Pelvis: No focal hepatic lesions. Gallbladder is removed. No biliary dilatation. No splenic lesions. Normal adrenal glands. No pancreatic lesions or peripancreatic stranding. Exophytic right renal cortical cysts. No suspicious renal parenchymal lesions. No renal collecting system dilatation.     Calcifications involving abdominal aorta, bilateral iliac vessels and SMA, with associated moderate to significant stenosis of the distal SMA trunk.     No  enlarged lymph nodes. No mesenteric or retroperitoneal nodularity. No free fluid or air within the abdomen or pelvis.     Unremarkable stomach. Normal caliber of small and large bowel. Distal colonic diverticulosis without evidence of complications.     Urinary bladder is within normal limits. No abnormal soft tissue densities in the regions of adnexa.     Musculoskeletal: Right breast is only partially included, without discrete suspicious lesions noted. There is a biopsied lesion within left breast, image 34 of series 2, measures up to 2.1 cm. There is also a left axillary lymphadenopathy, status post biopsy, with example of left axillary lymph node on image 37 of series 2 measuring up to 1.7 cm.     No aggressive osseous lesions or acute fractures.     IMPRESSION:  Left breast lesion and left axillary lymphadenopathy, representing known breast cancer with metastasis.   Several peripheral right lung nodules, nonspecific.   No enlarged mediastinal lymph nodes.   No suspicious lesions within the abdomen or pelvis.   No suspicious osseous lesions are identified.     CT Chest With Contrast Diagnostic (09/25/2024 12:40)   FINDINGS:   Mediastinum and Pleura: No mediastinal or hilar adenopathy. No pleural or pericardial effusion.     Lungs: 5 mm peripheral right upper lobe nodule, image 49 of series 3. Subpleural 6 mm peripheral right middle lobe nodule, image 58 series 3. Subpleural 6 mm right basal nodule, image 73 of series 3. No acute airspace disease. The central airways are unremarkable.     Abdomen and Pelvis: No focal hepatic lesions. Gallbladder is removed. No biliary dilatation. No splenic lesions. Normal adrenal glands. No pancreatic lesions or peripancreatic stranding. Exophytic right renal cortical cysts. No suspicious renal parenchymal lesions. No renal collecting system dilatation.     Calcifications involving abdominal aorta, bilateral iliac vessels and SMA, with associated moderate to significant  stenosis of the distal SMA trunk.     No enlarged lymph nodes. No mesenteric or retroperitoneal nodularity. No free fluid or air within the abdomen or pelvis.     Unremarkable stomach. Normal caliber of small and large bowel. Distal colonic diverticulosis without evidence of complications.     Urinary bladder is within normal limits. No abnormal soft tissue densities in the regions of adnexa.     Musculoskeletal: Right breast is only partially included, without discrete suspicious lesions noted. There is a biopsied lesion within left breast, image 34 of series 2, measures up to 2.1 cm. There is also a left axillary lymphadenopathy, status post biopsy, with example of left axillary lymph node on image 37 of series 2 measuring up to 1.7 cm.     No aggressive osseous lesions or acute fractures.     IMPRESSION:  Left breast lesion and left axillary lymphadenopathy, representing known breast cancer with metastasis.   Several peripheral right lung nodules, nonspecific.   No enlarged mediastinal lymph nodes.   No suspicious lesions within the abdomen or pelvis.   No suspicious osseous lesions are identified.     NM Bone Scan Whole Body (09/25/2024 13:41)   FINDINGS:   Bones: No sites of focal increased (hot) and decreased (cold) uptake in axial and appendicular skeleton to suggest bone metastasis.     Bones/Joints: Sites of mild uptake consistent with benign arthritic, degenerative or post traumatic changes.     Soft-tissues: Asymmetric mild uptake in the left breast soft tissues relative to the right. Two kidneys visualized.     IMPRESSION:  No evidence of osseous metastatic disease.     Adult Transthoracic Echo Complete W/ Cont if Necessary Per Protocol (01/03/2025 09:52)   Interpretation Summary    Left ventricular systolic function is normal. Estimated left ventricular EF = 55%    Left ventricular wall thickness is consistent with moderate concentric hypertrophy.    Left ventricular diastolic function is consistent  with (grade I) impaired relaxation.    Estimated right ventricular systolic pressure from tricuspid regurgitation is normal (<35 mmHg).    RECENT LABS:  Lab Results   Component Value Date    WBC 6.93 02/28/2025    HGB 9.7 (L) 02/28/2025    HCT 30.8 (L) 02/28/2025    MCV 91.9 02/28/2025    RDW 14.2 02/28/2025     02/28/2025    NEUTRORELPCT 75.4 02/28/2025    LYMPHORELPCT 8.1 (L) 02/28/2025    MONORELPCT 11.7 02/28/2025    EOSRELPCT 0.3 02/28/2025    BASORELPCT 1.0 02/28/2025    NEUTROABS 5.23 02/28/2025    LYMPHSABS 0.56 (L) 02/28/2025       Lab Results   Component Value Date     02/28/2025    K 4.0 02/28/2025    CO2 24.4 02/28/2025     02/28/2025    BUN 14 02/28/2025    CREATININE 1.05 (H) 02/28/2025    GLUCOSE 132 (H) 02/28/2025    CALCIUM 9.3 02/28/2025    ALKPHOS 133 (H) 02/28/2025    AST 26 02/28/2025    ALT 20 02/28/2025    BILITOT 0.2 02/28/2025    ALBUMIN 3.9 02/28/2025    PROTEINTOT 6.4 02/28/2025    MG 1.9 01/22/2025     Lab Results   Component Value Date    FERRITIN 384.20 (H) 02/28/2025    IRON 92 02/28/2025    TIBC 352 02/28/2025    LABIRON 26 02/28/2025    QZUCVKCM24 335 11/25/2024    FOLATE 7.32 11/25/2024     Lab Results   Component Value Date    TSH 2.790 11/25/2024     Lab Results   Component Value Date    HTKR53PF 26.3 (L) 11/25/2024     Lab Results   Component Value Date    LABCA2 44.6 (H) 11/25/2024      Lab Results   Component Value Date     37.7 (H) 11/25/2024       ASSESSMENT & PLAN:  Kristyn Goff is a very pleasant 75 y.o. female with Stage IIIA (pT2(m)N2aMX moderately differentiated, multifocal invasive ductal carcinoma with micropapillary features.  Tumors were 30 mm and 4 mm in diameter.  There was associated high grade DCIS.  Surgical margins were clear.  She had involvement of 3/4 SLN and 1/1 other LN.  Largest LN metastasis was 22 mm with extranodal extension.  Tumor was >95% 3+ ER+, 81-90% 2+ UT+, HER-2/Estelita - (0).    1.  Left Breast Cancer:  - Patient and  family report that the mass was present for about 15 years before she had it evaluated.  - She is s/p successful L mastectomy w/ clear margins and prophylactic contralateral mastectomy performed by Dr. Winnie He 11/4/24.  - She is now well-healed from her surgery.  - Baseline echocardiogram performed 1/3/2025 showed low normal LVEF 55%.  - Dr. Tellez recommended adjuvant chemotherapy to consist of DDAC x 4 with growth factor support followed by weekly Taxol x 12. We discussed potential risks benefits and side effects and she decided to proceed.  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  - Following adjuvant radiation she will require hormonal therapy.  Will plan to get DEXA closer to that time.  - She received her first cycle of dose dense AC chemotherapy on 1/16/2025.  Unfortunately shortly thereafter she came down with norovirus infection with prolific diarrhea. This resolved.   -  She reports that she continues to tolerate treatment well overall. She does note increased fatigue/generalized weakness following C3 DD AC. Intermittent nausea has been well controlled with antiemetics. She denies vomiting, diarrhea, constipation.  -  Exam today without cause for concern. Will proceed with C4 DD AC today.  -  She will follow up in ~1 week for Taxol chemotherapy education visit.  -  She will follow up with Dr. Tellez in ~3 weeks prior to C2 weekly Taxol (planned to begin 03/14/2025).     2.  Abscess L back:  - Resolved.    3.  Abnormal imaging findings:  -  She had CT CAP performed 9/25/24 which showed several tiny, nonspecific lung nodules in the R lung measuring 5-6 mm.  These were previously described on CTPE imaging 4/30/23.  Will need f/u CT imaging after an interval.  -  She also had 3 mm enhancing lesion in the L frontal bone noted on Brain MRI 9/25/24.  Could be c/w hemangioma.  Could not completely r/o metastatic lesion.  Bone scan  9/25/24 was negative.     -  Will plan to reimage after an interval.    4.  Diarrhea  - Turned out to be related to a norovirus infection from which she is now well recovered.  Diarrhea has resolved.    5. Normocytic anemia  6. Generalized weakness/fatigue  - Hg has been trending down (9.7 today). I suspect that this is related to chemotherapy and we discussed this. This is likely contributing to fatigue, generalized weakness. She also endorses shortness of breath with exertion.  - Iron is replete. Vitamin B12 and folate pending. Will monitor.    7.  Prophylaxis:  -  Kristyn L Samples did not receive 2024 flu vaccine.  This was recommended but declined.  -  Kristyn L Samples did not receive Prevnar vaccine.  This was recommended but declined.  -  Kristyn L Samples did not receive COVID vaccine.  This was recommended but declined.    8. ACO / DANK/Other  Quality measures  -  Kristyn L Samples did not receive 2024 flu vaccine.  This was recommended.  -  Kristyn L Samples reports a pain score of 0.    -  Current outpatient and discharge medications have been reconciled for the patient.  Reviewed by: JOHN Yan    9. Follow Up:  -  Proceed with C4 DD AC today.  -  Follow up with JOHN in ~1 week for Taxol chemotherapy education visit.  -  Plan to begin qweekly Taxol on 03/14/2025.  -  She will follow up with Dr. Tellez w/ C2 weekly Taxol w/ CBC, CMP.        I spent 30 minutes with Kristyn Goff today.  In the office today, more than 50% of this time was spent face-to-face with her  in counseling / coordination of care, reviewing her interim medical history and counseling on the current treatment plan.  All questions were answered to her satisfaction.           Electronically Signed by: JOHN Peters       CC:     JOHN Toledo DO Veronica Morgan Jones, MD

## 2025-03-06 DIAGNOSIS — C50.812 MALIGNANT NEOPLASM OF OVERLAPPING SITES OF LEFT BREAST IN FEMALE, ESTROGEN RECEPTOR POSITIVE: Primary | ICD-10-CM

## 2025-03-06 DIAGNOSIS — Z17.0 MALIGNANT NEOPLASM OF OVERLAPPING SITES OF LEFT BREAST IN FEMALE, ESTROGEN RECEPTOR POSITIVE: Primary | ICD-10-CM

## 2025-03-06 RX ORDER — FAMOTIDINE 10 MG/ML
20 INJECTION, SOLUTION INTRAVENOUS AS NEEDED
OUTPATIENT
Start: 2025-03-14

## 2025-03-06 RX ORDER — DIPHENHYDRAMINE HYDROCHLORIDE 50 MG/ML
50 INJECTION INTRAMUSCULAR; INTRAVENOUS AS NEEDED
OUTPATIENT
Start: 2025-03-14

## 2025-03-06 RX ORDER — SODIUM CHLORIDE 9 MG/ML
20 INJECTION, SOLUTION INTRAVENOUS ONCE
OUTPATIENT
Start: 2025-03-14

## 2025-03-06 RX ORDER — FAMOTIDINE 10 MG/ML
20 INJECTION, SOLUTION INTRAVENOUS ONCE
OUTPATIENT
Start: 2025-03-14

## 2025-03-06 RX ORDER — HYDROCORTISONE SODIUM SUCCINATE 100 MG/2ML
100 INJECTION INTRAMUSCULAR; INTRAVENOUS AS NEEDED
OUTPATIENT
Start: 2025-03-14

## 2025-03-10 ENCOUNTER — TELEPHONE (OUTPATIENT)
Dept: ONCOLOGY | Facility: CLINIC | Age: 75
End: 2025-03-10

## 2025-03-10 NOTE — TELEPHONE ENCOUNTER
Kenneth called to r/s her Chemo Education appt this morning because she went to the ER at [facility] in [place] early this morning. He stated that she just got out of ER at 3 am this morning. The reason he took her she was very dizzy and weak. They told her she has a UTI. They gave her Augmentin.   They told him that her white blood count was .2 and she is neutropenic. He stated she was so weak that he had to call an ambulance to get her to the hospital  . She is has not been eating well and has had diarrhea for a while. When she did feel like eating it is going straight through her. He wanted to know if  DR. Tellez would want her to have any thing other than Augmentin and wanted to know if she could have something for the diarrhea called in to [facility] in [place] . He stated she would have to get better form this before he would be able to get her here for her Chemo Ed.

## 2025-03-12 ENCOUNTER — TELEPHONE (OUTPATIENT)
Dept: ONCOLOGY | Facility: CLINIC | Age: 75
End: 2025-03-12

## 2025-03-12 NOTE — TELEPHONE ENCOUNTER
Called Kristyn to offer Acute Visit appointment for today. She stated that she didn't feel like coming in but she was feeling better.She stated that she would probably need to cancel appointment for Thursday and Friday. Until she was feeling better. She said,her son would call back to reschedule her appointments.

## 2025-03-14 DIAGNOSIS — Z17.0 MALIGNANT NEOPLASM OF OVERLAPPING SITES OF LEFT BREAST IN FEMALE, ESTROGEN RECEPTOR POSITIVE: Primary | ICD-10-CM

## 2025-03-14 DIAGNOSIS — C50.812 MALIGNANT NEOPLASM OF OVERLAPPING SITES OF LEFT BREAST IN FEMALE, ESTROGEN RECEPTOR POSITIVE: Primary | ICD-10-CM

## 2025-03-14 RX ORDER — DIPHENHYDRAMINE HYDROCHLORIDE 50 MG/ML
50 INJECTION INTRAMUSCULAR; INTRAVENOUS AS NEEDED
OUTPATIENT
Start: 2025-03-21

## 2025-03-14 RX ORDER — FAMOTIDINE 10 MG/ML
20 INJECTION, SOLUTION INTRAVENOUS ONCE
OUTPATIENT
Start: 2025-03-21

## 2025-03-14 RX ORDER — SODIUM CHLORIDE 9 MG/ML
20 INJECTION, SOLUTION INTRAVENOUS ONCE
OUTPATIENT
Start: 2025-03-21

## 2025-03-14 RX ORDER — HYDROCORTISONE SODIUM SUCCINATE 100 MG/2ML
100 INJECTION INTRAMUSCULAR; INTRAVENOUS AS NEEDED
OUTPATIENT
Start: 2025-03-21

## 2025-03-14 RX ORDER — FAMOTIDINE 10 MG/ML
20 INJECTION, SOLUTION INTRAVENOUS AS NEEDED
OUTPATIENT
Start: 2025-03-21

## 2025-03-20 ENCOUNTER — OFFICE VISIT (OUTPATIENT)
Dept: ONCOLOGY | Facility: CLINIC | Age: 75
End: 2025-03-20
Payer: MEDICARE

## 2025-03-20 ENCOUNTER — CLINICAL SUPPORT (OUTPATIENT)
Dept: ONCOLOGY | Facility: CLINIC | Age: 75
End: 2025-03-20
Payer: MEDICARE

## 2025-03-20 VITALS
DIASTOLIC BLOOD PRESSURE: 64 MMHG | BODY MASS INDEX: 32.67 KG/M2 | TEMPERATURE: 97.1 F | SYSTOLIC BLOOD PRESSURE: 132 MMHG | WEIGHT: 214.8 LBS | OXYGEN SATURATION: 96 % | RESPIRATION RATE: 18 BRPM | HEART RATE: 81 BPM

## 2025-03-20 DIAGNOSIS — R53.81 DEBILITY: ICD-10-CM

## 2025-03-20 DIAGNOSIS — Z17.0 MALIGNANT NEOPLASM OF OVERLAPPING SITES OF LEFT BREAST IN FEMALE, ESTROGEN RECEPTOR POSITIVE: ICD-10-CM

## 2025-03-20 DIAGNOSIS — C50.812 MALIGNANT NEOPLASM OF OVERLAPPING SITES OF LEFT BREAST IN FEMALE, ESTROGEN RECEPTOR POSITIVE: Primary | ICD-10-CM

## 2025-03-20 DIAGNOSIS — C50.812 MALIGNANT NEOPLASM OF OVERLAPPING SITES OF LEFT BREAST IN FEMALE, ESTROGEN RECEPTOR POSITIVE: ICD-10-CM

## 2025-03-20 DIAGNOSIS — Z17.0 MALIGNANT NEOPLASM OF OVERLAPPING SITES OF LEFT BREAST IN FEMALE, ESTROGEN RECEPTOR POSITIVE: Primary | ICD-10-CM

## 2025-03-20 LAB
ALBUMIN SERPL-MCNC: 3.7 G/DL (ref 3.5–5.2)
ALBUMIN/GLOB SERPL: 1.3 G/DL
ALP SERPL-CCNC: 141 U/L (ref 39–117)
ALT SERPL W P-5'-P-CCNC: 17 U/L (ref 1–33)
ANION GAP SERPL CALCULATED.3IONS-SCNC: 15.1 MMOL/L (ref 5–15)
AST SERPL-CCNC: 28 U/L (ref 1–32)
BASOPHILS # BLD AUTO: 0.03 10*3/MM3 (ref 0–0.2)
BASOPHILS NFR BLD AUTO: 0.5 % (ref 0–1.5)
BILIRUB SERPL-MCNC: 0.3 MG/DL (ref 0–1.2)
BUN SERPL-MCNC: 11 MG/DL (ref 8–23)
BUN/CREAT SERPL: 12.5 (ref 7–25)
CALCIUM SPEC-SCNC: 9 MG/DL (ref 8.6–10.5)
CHLORIDE SERPL-SCNC: 101 MMOL/L (ref 98–107)
CO2 SERPL-SCNC: 24.9 MMOL/L (ref 22–29)
CREAT SERPL-MCNC: 0.88 MG/DL (ref 0.57–1)
DEPRECATED RDW RBC AUTO: 52.6 FL (ref 37–54)
EGFRCR SERPLBLD CKD-EPI 2021: 68.2 ML/MIN/1.73
EOSINOPHIL # BLD AUTO: 0.01 10*3/MM3 (ref 0–0.4)
EOSINOPHIL NFR BLD AUTO: 0.2 % (ref 0.3–6.2)
ERYTHROCYTE [DISTWIDTH] IN BLOOD BY AUTOMATED COUNT: 21.3 % (ref 12.3–15.4)
GLOBULIN UR ELPH-MCNC: 2.9 GM/DL
GLUCOSE SERPL-MCNC: 122 MG/DL (ref 65–99)
HCT VFR BLD AUTO: 28.6 % (ref 34–46.6)
HGB BLD-MCNC: 9.1 G/DL (ref 12–15.9)
IMM GRANULOCYTES # BLD AUTO: 0.05 10*3/MM3 (ref 0–0.05)
IMM GRANULOCYTES NFR BLD AUTO: 0.8 % (ref 0–0.5)
LYMPHOCYTES # BLD AUTO: 0.71 10*3/MM3 (ref 0.7–3.1)
LYMPHOCYTES NFR BLD AUTO: 11.3 % (ref 19.6–45.3)
MCH RBC QN AUTO: 29.9 PG (ref 26.6–33)
MCHC RBC AUTO-ENTMCNC: 31.8 G/DL (ref 31.5–35.7)
MCV RBC AUTO: 94.1 FL (ref 79–97)
MONOCYTES # BLD AUTO: 0.84 10*3/MM3 (ref 0.1–0.9)
MONOCYTES NFR BLD AUTO: 13.3 % (ref 5–12)
NEUTROPHILS NFR BLD AUTO: 4.66 10*3/MM3 (ref 1.7–7)
NEUTROPHILS NFR BLD AUTO: 73.9 % (ref 42.7–76)
NRBC BLD AUTO-RTO: 0 /100 WBC (ref 0–0.2)
PLATELET # BLD AUTO: 177 10*3/MM3 (ref 140–450)
PMV BLD AUTO: 10.1 FL (ref 6–12)
POTASSIUM SERPL-SCNC: 3.2 MMOL/L (ref 3.5–5.2)
PROT SERPL-MCNC: 6.6 G/DL (ref 6–8.5)
RBC # BLD AUTO: 3.04 10*6/MM3 (ref 3.77–5.28)
SODIUM SERPL-SCNC: 141 MMOL/L (ref 136–145)
WBC NRBC COR # BLD AUTO: 6.3 10*3/MM3 (ref 3.4–10.8)

## 2025-03-20 PROCEDURE — 85025 COMPLETE CBC W/AUTO DIFF WBC: CPT | Performed by: NURSE PRACTITIONER

## 2025-03-20 PROCEDURE — 80053 COMPREHEN METABOLIC PANEL: CPT | Performed by: NURSE PRACTITIONER

## 2025-03-20 RX ORDER — POTASSIUM CHLORIDE 1500 MG/1
20 TABLET, EXTENDED RELEASE ORAL 2 TIMES DAILY
Qty: 6 TABLET | Refills: 0 | Status: SHIPPED | OUTPATIENT
Start: 2025-03-20

## 2025-03-20 NOTE — PROGRESS NOTES
CHEMOTHERAPY PREPARATION    Kristyn Goff  [id number]  1950    Chief Complaint: Chemotherapy Education    History of present illness:  Kristyn Goff is a 75 y.o. year old female who is here today for chemotherapy preparation and needs assessment. The patient has been diagnosed with breast cancer and is scheduled to begin treatment with Taxol. She is accompanied by her son today who reports that she has been having difficulty with performing ADLs and is becoming increasingly weak. She states that she has palpitations and shortness of breath with exertion and states that this resolves when she sits down. She has not been eating well but has been supplementing with 1-2 boost per day. She is otherwise without specific complaints.     Oncology History:    Oncology/Hematology History   Malignant neoplasm of overlapping sites of left breast in female, estrogen receptor positive   11/25/2024 Initial Diagnosis    Malignant neoplasm of overlapping sites of left breast in female, estrogen receptor positive     1/16/2025 - 2/28/2025 Chemotherapy    OP Breast DD AC DOXOrubicin / Cyclophosphamide      1/16/2025 -  Chemotherapy    OP CENTRAL VENOUS ACCESS DEVICE Access, Care, and Maintenance (CVAD)     4/4/2025 -  Chemotherapy    OP BREAST PACLitaxel Adjuvant (Weekly X 12)       Past Medical History:   Diagnosis Date    Breast cancer     Elevated cholesterol     GERD (gastroesophageal reflux disease)     High cholesterol      Past Surgical History:   Procedure Laterality Date    CATARACT EXTRACTION Right     COLONOSCOPY      GALLBLADDER SURGERY      MASTECTOMY Bilateral     PARATHYROIDECTOMY      VENOUS ACCESS DEVICE (PORT) INSERTION Right 1/13/2025    Procedure: INSERTION VENOUS ACCESS DEVICE;  Surgeon: Karen Johnston MD;  Location: [facility];  Service: General;  Laterality: Right;     Family History   Problem Relation Age of Onset    Heart attack Father     Hypertension Sister     Heart attack Sister     Diabetes  Sister        Medications: The current medication list was reviewed and reconciled.     Allergies:  has no known allergies.    Review of Systems:  A comprehensive 14 point review of systems was conducted with patient and positive as per HPI otherwise negative.    Physical Exam:    Vitals:    03/20/25 0926   BP: 132/64   Pulse: 81   Resp: 18   Temp: 97.1 °F (36.2 °C)   SpO2: 96%     Vitals:    03/20/25 0926   PainSc: 0-No pain        ECOG: (2) Ambulatory & Capable of Self Care, Unable to Carry Out Work Activity, Up & About Greater Than 50% of Waking Hours    General/Constitutional: Awake, alert and oriented. No apparent acute distress is noted.  HEENT: Normocephalic, atraumatic. PERRLA, conjunctiva normal. Extraocular movements are intact.  Neck: No JVD, thyromegaly or cervical lymphadenopathy.  Cardiovascular: S1, S2. Regular rate and rhythm, no murmurs, rubs or gallops.  Respiratory: Pulmonary effort is normal, lungs are clear to auscultation bilaterally. No wheezing, rhonchi or rales. No use of accessory muscles, no retractions.  Abdomen: Soft, non-tender, non-distended with normoactive bowel sounds x 4 quadrants. No palpable hepatosplenomegaly.  Lymph: No cervical, supraclavicular, axillary, inguinal or femoral adenopathy.  Integumentary: Skin warm and dry. No bruising, ecchymosis.  Extremities: No clubbing, cyanosis or edema.  Neurological: Alert and oriented x 3. Grossly non-focal examination.            NEEDS ASSESSMENTS    Genetics  The patient's new diagnosis and family history have been reviewed for genetic counseling needs. A genetic referral is not recommended.     Barriers to care  A barriers form was also completed by the patient today. We discussed services offered by our facility to help her have adequate access to care. The patient was given the name and card for our Oncology Social Worker, Elena Garvin. Based upon barriers assessment today, the patient will not require a follow-up call from the social  "worker to further discuss needs.     VAD Assessment  The patient and I discussed planned intervenous chemotherapy as well as other IV treatments that are often needed throughout the course of treatment. These may include, but are not limited to blood transfusions, antibiotics, and IV hydration. The vasculature does not appear to be adequate for multiple peripheral IVs throughout their treatment course. Discussed risks and benefits of VADs. The patient would like to pursue Port-A-Cath insertion prior to initiation of treatment.     Advanced Care Planning  The patient and I discussed advanced care planning, \"Conversations that Matter\".   This service was offered, free of charge, for development of advance directives with a certified ACP facilitator.  The patient does not have an up-to-date advanced directive. This document is not on file with our office. The patient is not interested in an appointment with one of our facilitators to create or update their advanced directives.      Palliative Care  The patient and I discussed palliative care services. Palliative care is not the same as Hospice care. This is specialized medical care for people living with serious illness with the goal of improving quality of life for the patient and their family. [facility] has partnered with [facility] to offer our patients outpatient palliative care early along with their treatment to assist in coordination of care, symptom management, pain management, and medical decision making.  Oncology criteria for palliative care referral is not met at this time. The patient is not interested in a palliative care consultation.     Additional Referral needs  none      CHEMOTHERAPY EDUCATION    Booklets Given: Chemotherapy and You [x]  Eating Hints [x]    Sexuality/Fertility Books []      Chemotherapy/Biotherapy Education Sheets: (list all that apply)  nausea management, acid reflux management, diarrhea management, Cancer resourse " contacts information, skin and mouth care, and vaccination information                                                                                                                                                                 Chemotherapy Regimen:   Treatment Plans       Name Type Plan Dates Plan Provider         Active    OP BREAST PACLitaxel Adjuvant (Weekly X 12) ONCOLOGY TREATMENT 3/13/2025 - Present Kaylee Tellez MD                      TOPICS EDUCATION PROVIDED COMMENTS   ANEMIA:  role of RBC, cause, s/s, ways to manage, role of transfusion [x]    THROMBOCYTOPENIA:  role of platelet, cause, s/s, ways to prevent bleeding, things to avoid, when to seek help [x]    NEUTROPENIA:  role of WBC, cause, infection precautions, s/s of infection, when to call MD [x]    NUTRITION & APPETITE CHANGES:  importance of maintaining healthy diet & weight, ways to manage to improve intake, dietary consult, exercise regimen [x]    DIARRHEA:  causes, s/s of dehydration, ways to manage, dietary changes, when to call MD [x]    CONSTIPATION:  causes, ways to manage, dietary changes, when to call MD [x]    NAUSEA & VOMITING:  cause, use of antiemetics, dietary changes, when to call MD [x]    MOUTH SORES:  causes, oral care, ways to manage [x]    ALOPECIA:  cause, ways to manage, resources [x]    INFERTILITY & SEXUALITY:  causes, fertility preservation options, sexuality changes, ways to manage, importance of birth control [x]    NERVOUS SYSTEM CHANGES:  causes, s/s, neuropathies, cognitive changes, ways to manage [x]    PAIN:  causes, ways to manage [x]    SKIN & NAIL CHANGES:  cause, s/s, ways to manage [x]    ORGAN TOXICITIES:  cause, s/s, need for diagnostic tests, labs, when to notify MD [x]    SURVIVORSHIP:  distress, distress assessment, secondary malignancies, early/late effects, follow-up, social issues, social support [x]    HOME CARE:  use of spill kits, storing of PO chemo, how to manage bodily fluids [x]     MISCELLANEOUS:  drug interactions, administration, vesicant, et [x]      Assessment and Plan:    Diagnoses and all orders for this visit:    1. Malignant neoplasm of overlapping sites of left breast in female, estrogen receptor positive (Primary)    2. Debility    Other orders  -     potassium chloride (KLOR-CON M20) 20 MEQ CR tablet; Take 1 tablet by mouth 2 (Two) Times a Day.  Dispense: 6 tablet; Refill: 0      - Chemotherapy teaching was also completed today as documented above. Adequate time was given to answer all questions to her satisfaction. Patient and family are aware of their care team members and contact information if they have questions or problems throughout the treatment course. Needs assessments and education has been completed. The patient is adequately prepared to begin treatment as scheduled.   - Reviewed with patient education regarding EMLA cream, Compazine, and Zofran and prescriptions were sent to the pharmacy of patient's choice.  - I advised the patient thatHIM@ can take Tylenol or Ibuprofen as needed for aches/pains related to cancer/treatment. I also advised patientHINATASHA@ could use Senakot or Miralax as needed for constipation or Imodium as needed for diarrhea.    - I reviewed with the patient the care team members. I also reviewed the option of the urgent care clinic through our oncology office for evaluation and management of symptoms related to treatment. Patient was provided with phone number to call during regular office hours [phone] press #1 and for treatment related questions call [phone] to speak to a nurse. If after hours or on the weekend call [phone] and ask to page the MD on call for [facility] Oncology services.   - Consent for treatment with Paclitaxel as recommended by Dr. Tellez for the treatment of breast cancer was signed by the patient today.     Debility  - Likely secondary to immobility that she feels is secondary to chemotherapy  "treatments. She has difficulty performing ADLs.   - Discussed referral to PT through HH with patient and is adamant that she is not participating in physical therapy and declines referral. I have encouraged her to move around in her home for 10 minutes every hour and states that she will try to do better but that she \"gets worn out\".  - Will also delay start of Taxol by 2 weeks to allow for recover. She will follow up with JOHN prior to resumption of treatment.         I spent 60 minutes with Kristyn Goff today.  In the office today, more than 50% of this time was spent face-to-face with her  in counseling / coordination of care, reviewing her interim medical history and counseling on the current treatment plan.  All questions were answered to her satisfaction.               Electronically Signed by: JOHN Peters   "

## 2025-03-20 NOTE — PROGRESS NOTES
Venipuncture Blood Specimen Collection  Venipuncture performed in Right arm by Lynda Whitley MA with good hemostasis. Patient tolerated the procedure well without complications.   03/20/25   Lynda Whitley MA

## 2025-04-04 ENCOUNTER — INFUSION (OUTPATIENT)
Dept: ONCOLOGY | Facility: HOSPITAL | Age: 75
End: 2025-04-04
Payer: MEDICARE

## 2025-04-04 ENCOUNTER — LAB (OUTPATIENT)
Dept: ONCOLOGY | Facility: HOSPITAL | Age: 75
End: 2025-04-04
Payer: MEDICARE

## 2025-04-04 ENCOUNTER — OFFICE VISIT (OUTPATIENT)
Dept: ONCOLOGY | Facility: CLINIC | Age: 75
End: 2025-04-04
Payer: MEDICARE

## 2025-04-04 ENCOUNTER — PATIENT OUTREACH (OUTPATIENT)
Dept: ONCOLOGY | Facility: CLINIC | Age: 75
End: 2025-04-04
Payer: MEDICARE

## 2025-04-04 VITALS
RESPIRATION RATE: 20 BRPM | WEIGHT: 219.5 LBS | OXYGEN SATURATION: 99 % | SYSTOLIC BLOOD PRESSURE: 145 MMHG | TEMPERATURE: 98.1 F | BODY MASS INDEX: 33.38 KG/M2 | DIASTOLIC BLOOD PRESSURE: 74 MMHG | HEART RATE: 113 BPM

## 2025-04-04 VITALS
RESPIRATION RATE: 20 BRPM | WEIGHT: 219.5 LBS | SYSTOLIC BLOOD PRESSURE: 145 MMHG | TEMPERATURE: 98.1 F | DIASTOLIC BLOOD PRESSURE: 74 MMHG | OXYGEN SATURATION: 99 % | HEART RATE: 113 BPM | BODY MASS INDEX: 33.38 KG/M2

## 2025-04-04 DIAGNOSIS — Z17.0 MALIGNANT NEOPLASM OF OVERLAPPING SITES OF LEFT BREAST IN FEMALE, ESTROGEN RECEPTOR POSITIVE: Primary | ICD-10-CM

## 2025-04-04 DIAGNOSIS — C50.812 MALIGNANT NEOPLASM OF OVERLAPPING SITES OF LEFT BREAST IN FEMALE, ESTROGEN RECEPTOR POSITIVE: Primary | ICD-10-CM

## 2025-04-04 DIAGNOSIS — Z17.0 MALIGNANT NEOPLASM OF OVERLAPPING SITES OF LEFT BREAST IN FEMALE, ESTROGEN RECEPTOR POSITIVE: ICD-10-CM

## 2025-04-04 DIAGNOSIS — C50.812 MALIGNANT NEOPLASM OF OVERLAPPING SITES OF LEFT BREAST IN FEMALE, ESTROGEN RECEPTOR POSITIVE: ICD-10-CM

## 2025-04-04 LAB
ALBUMIN SERPL-MCNC: 3.9 G/DL (ref 3.5–5.2)
ALBUMIN/GLOB SERPL: 1.4 G/DL
ALP SERPL-CCNC: 120 U/L (ref 39–117)
ALT SERPL W P-5'-P-CCNC: 20 U/L (ref 1–33)
ANION GAP SERPL CALCULATED.3IONS-SCNC: 13 MMOL/L (ref 5–15)
AST SERPL-CCNC: 26 U/L (ref 1–32)
BASOPHILS # BLD AUTO: 0.03 10*3/MM3 (ref 0–0.2)
BASOPHILS NFR BLD AUTO: 0.4 % (ref 0–1.5)
BILIRUB SERPL-MCNC: 0.4 MG/DL (ref 0–1.2)
BUN SERPL-MCNC: 19 MG/DL (ref 8–23)
BUN/CREAT SERPL: 24.1 (ref 7–25)
CALCIUM SPEC-SCNC: 9.2 MG/DL (ref 8.6–10.5)
CHLORIDE SERPL-SCNC: 104 MMOL/L (ref 98–107)
CO2 SERPL-SCNC: 25 MMOL/L (ref 22–29)
CREAT SERPL-MCNC: 0.79 MG/DL (ref 0.57–1)
DEPRECATED RDW RBC AUTO: 74.8 FL (ref 37–54)
EGFRCR SERPLBLD CKD-EPI 2021: 77.6 ML/MIN/1.73
EOSINOPHIL # BLD AUTO: 0.08 10*3/MM3 (ref 0–0.4)
EOSINOPHIL NFR BLD AUTO: 1.1 % (ref 0.3–6.2)
ERYTHROCYTE [DISTWIDTH] IN BLOOD BY AUTOMATED COUNT: 21.2 % (ref 12.3–15.4)
GLOBULIN UR ELPH-MCNC: 2.8 GM/DL
GLUCOSE SERPL-MCNC: 124 MG/DL (ref 65–99)
HCT VFR BLD AUTO: 32.5 % (ref 34–46.6)
HGB BLD-MCNC: 10 G/DL (ref 12–15.9)
IMM GRANULOCYTES # BLD AUTO: 0.03 10*3/MM3 (ref 0–0.05)
IMM GRANULOCYTES NFR BLD AUTO: 0.4 % (ref 0–0.5)
LYMPHOCYTES # BLD AUTO: 0.6 10*3/MM3 (ref 0.7–3.1)
LYMPHOCYTES NFR BLD AUTO: 8 % (ref 19.6–45.3)
MCH RBC QN AUTO: 30.4 PG (ref 26.6–33)
MCHC RBC AUTO-ENTMCNC: 30.8 G/DL (ref 31.5–35.7)
MCV RBC AUTO: 98.8 FL (ref 79–97)
MONOCYTES # BLD AUTO: 0.84 10*3/MM3 (ref 0.1–0.9)
MONOCYTES NFR BLD AUTO: 11.2 % (ref 5–12)
NEUTROPHILS NFR BLD AUTO: 5.92 10*3/MM3 (ref 1.7–7)
NEUTROPHILS NFR BLD AUTO: 78.9 % (ref 42.7–76)
NRBC BLD AUTO-RTO: 0 /100 WBC (ref 0–0.2)
PLATELET # BLD AUTO: 234 10*3/MM3 (ref 140–450)
PMV BLD AUTO: 9.4 FL (ref 6–12)
POTASSIUM SERPL-SCNC: 4.1 MMOL/L (ref 3.5–5.2)
PROT SERPL-MCNC: 6.7 G/DL (ref 6–8.5)
RBC # BLD AUTO: 3.29 10*6/MM3 (ref 3.77–5.28)
SODIUM SERPL-SCNC: 142 MMOL/L (ref 136–145)
WBC NRBC COR # BLD AUTO: 7.5 10*3/MM3 (ref 3.4–10.8)

## 2025-04-04 PROCEDURE — 25810000003 SODIUM CHLORIDE 0.9 % SOLUTION 250 ML FLEX CONT: Performed by: INTERNAL MEDICINE

## 2025-04-04 PROCEDURE — 85025 COMPLETE CBC W/AUTO DIFF WBC: CPT | Performed by: NURSE PRACTITIONER

## 2025-04-04 PROCEDURE — 96413 CHEMO IV INFUSION 1 HR: CPT

## 2025-04-04 PROCEDURE — 25010000002 HEPARIN LOCK FLUSH PER 10 UNITS

## 2025-04-04 PROCEDURE — 80053 COMPREHEN METABOLIC PANEL: CPT | Performed by: NURSE PRACTITIONER

## 2025-04-04 PROCEDURE — 25010000002 DIPHENHYDRAMINE PER 50 MG: Performed by: INTERNAL MEDICINE

## 2025-04-04 PROCEDURE — 25010000002 PACLITAXEL PER 1 MG: Performed by: INTERNAL MEDICINE

## 2025-04-04 PROCEDURE — 25010000002 FAMOTIDINE 10 MG/ML SOLUTION: Performed by: INTERNAL MEDICINE

## 2025-04-04 PROCEDURE — 96375 TX/PRO/DX INJ NEW DRUG ADDON: CPT

## 2025-04-04 PROCEDURE — 25010000002 DEXAMETHASONE SODIUM PHOSPHATE 20 MG/5ML SOLUTION: Performed by: INTERNAL MEDICINE

## 2025-04-04 RX ORDER — HEPARIN SODIUM (PORCINE) LOCK FLUSH IV SOLN 100 UNIT/ML 100 UNIT/ML
500 SOLUTION INTRAVENOUS AS NEEDED
OUTPATIENT
Start: 2025-04-04

## 2025-04-04 RX ORDER — SODIUM CHLORIDE 0.9 % (FLUSH) 0.9 %
10 SYRINGE (ML) INJECTION AS NEEDED
OUTPATIENT
Start: 2025-04-04

## 2025-04-04 RX ORDER — SODIUM CHLORIDE 0.9 % (FLUSH) 0.9 %
10 SYRINGE (ML) INJECTION AS NEEDED
Status: DISCONTINUED | OUTPATIENT
Start: 2025-04-04 | End: 2025-04-04 | Stop reason: HOSPADM

## 2025-04-04 RX ORDER — HEPARIN SODIUM (PORCINE) LOCK FLUSH IV SOLN 100 UNIT/ML 100 UNIT/ML
500 SOLUTION INTRAVENOUS AS NEEDED
Status: DISCONTINUED | OUTPATIENT
Start: 2025-04-04 | End: 2025-04-04 | Stop reason: HOSPADM

## 2025-04-04 RX ORDER — FAMOTIDINE 10 MG/ML
20 INJECTION, SOLUTION INTRAVENOUS ONCE
Status: COMPLETED | OUTPATIENT
Start: 2025-04-04 | End: 2025-04-04

## 2025-04-04 RX ORDER — SODIUM CHLORIDE 9 MG/ML
20 INJECTION, SOLUTION INTRAVENOUS ONCE
Status: DISCONTINUED | OUTPATIENT
Start: 2025-04-04 | End: 2025-04-04 | Stop reason: HOSPADM

## 2025-04-04 RX ADMIN — FAMOTIDINE 20 MG: 10 INJECTION, SOLUTION INTRAVENOUS at 13:28

## 2025-04-04 RX ADMIN — DEXAMETHASONE SODIUM PHOSPHATE 12 MG: 4 INJECTION, SOLUTION INTRA-ARTICULAR; INTRALESIONAL; INTRAMUSCULAR; INTRAVENOUS; SOFT TISSUE at 13:43

## 2025-04-04 RX ADMIN — DIPHENHYDRAMINE HYDROCHLORIDE 50 MG: 50 INJECTION, SOLUTION INTRAMUSCULAR; INTRAVENOUS at 13:29

## 2025-04-04 RX ADMIN — Medication 10 ML: at 15:43

## 2025-04-04 RX ADMIN — SODIUM CHLORIDE 171 MG: 9 INJECTION, SOLUTION INTRAVENOUS at 14:21

## 2025-04-04 RX ADMIN — HEPARIN 500 UNITS: 100 SYRINGE at 15:43

## 2025-04-04 NOTE — PROGRESS NOTES
"NAME: Kristyn Goff    : 1950    DATE: 2025    DIAGNOSIS: Malignant neoplasm of upper-outer quadrant of left breast in female, estrogen receptor positive     TREATMENT HISTORY:   Left mastectomy and SLNBx  with Prophylactic Contralateral Mastectomy performed by Dr. Winnie He 24    2.            CHIEF COMPLAINT:  Follow Up Breast Cancer/Toxicity Check    HISTORY OF PRESENT ILLNESS:   Kristyn Goff is a very pleasant 75 y.o. female who is being seen today in the presence of her son and daughter-in-law at the request of Maria Esther He MD for evaluation and treatment of breast cancer.  Ms. Goff says she first noticed a \"knot\" in her left breast about 15 years prior around .  Then it was maybe the size of a marble.  She says it enlarged over time until it was about the size of a lime.  She was seeing her PEP, JOHN Toledo who noticed the lump during an exam and ordered further evaluation.  She had biopsy of two areas in the L breast as well as L axillary LN and this showed invasive moderately differentiated ductal carcinoma with micropapillary features Tumor was >95% 3+ ER+, 81-90% 2+ LA+, HER-2/Estelita - (0).  L axillary LN was involved.  Dr. He took her for L mastectomy w/ SLNBx and prophylactic contralateral mastectomy as below on 24.  Patholgy showed a Stage IIIA multifocal invasive ductal carcinoma of the left breast with micropapillary features. Tumors were 30 mm, 4 mm.  There was associated high grade DCIS.  4/5 LN were involved (3/4 SLN and 1/1 other LN), the largest being 22 mm with extranodal extension.  R breast was without malignancy.    Ms. Goff is recovering reasonably well.  She has healed somewhat slowly and still has L axillary drain in place but she is making steady progress and now says she has minimal output from the axillary drain.  She is to see Dr. He on Wednesday.   She denies weight loss.  No fevers or chills. No chest pain or shortness " "of breath. No abdominal pain, n/v/d/c, no rectal bleeding.  No bony pain. No headaches or visual disturbance.  They do complain that she has developed a \"place\" on her back they are concerned about that they want me to look at today.    Of note, prior to her surgery she had CT imaging as below which showed several small, nonspecific lung nodules in the R lung (5-6 mm).  Of note similar finding was noted on CTPE protocol from 23).  MRI of the brain also showed a 3 mm enhancing lesion in the L frontal bone which could represent hemangioma although metastatic lesion could not be completely excluded.  Bone scan was clear.        INTERVAL HISTORY:  Ms. Goff presents today for follow up of breast cancer and toxicity check.  She reports doing well since her last visit with us.  Fatigue and weakness have improved.  She is not having any nausea, vomiting or diarrhea.  She reports eating and drinking well today.  She is otherwise without any other new or specific complaints today.      PAST MEDICAL HISTORY:  Past Medical History:   Diagnosis Date    Breast cancer     Elevated cholesterol     GERD (gastroesophageal reflux disease)     High cholesterol    -  H/o Diverticulosis  -  H/o Hyperparathyroidism    Risk Factors:  Age of Menarche: 7 yo  Age of Menopause: around age 54  Prior Breast Disease: Denies prior bx but says this lesion was present for maybe 15 yrs before she sought attention.  Pregnancies:    Age of 1st pregnancy:23  Family History of Breast Cancer: denies  Family History of Ovarian Cancer:Denies  History of HRT use:  Denies       PAST SURGICAL HISTORY:  Past Surgical History:   Procedure Laterality Date    CATARACT EXTRACTION Right     COLONOSCOPY      GALLBLADDER SURGERY      MASTECTOMY Bilateral     PARATHYROIDECTOMY      VENOUS ACCESS DEVICE (PORT) INSERTION Right 2025    Procedure: INSERTION VENOUS ACCESS DEVICE;  Surgeon: Karen Johnston MD;  Location: [facility];  Service: General;  " Laterality: Right;   -  Parathyrodectomy  -  S/p mario cataract surgery w/ implant placement  -  CCU    FAMILY HISTORY:  Family History   Problem Relation Age of Onset    Heart attack Father     Hypertension Sister     Heart attack Sister     Diabetes Sister    -  Denies family h/o malignancy of any kind    SOCIAL HISTORY:  Social History     Socioeconomic History    Marital status:    Tobacco Use    Smoking status: Former     Types: Cigarettes    Smokeless tobacco: Never   Vaping Use    Vaping status: Never Used   Substance and Sexual Activity    Alcohol use: Never    Drug use: Never    Sexual activity: Defer   -  .  Lives alone.  Used to own a flower shop but retired around 2012.  Former smoker, quit around age 33 yo      REVIEW OF SYSTEMS:   A comprehensive 14 point review of systems was performed.  Significant findings as mentioned above.  All other systems reviewed and are negative.      MEDICATIONS:  The current medication list was reviewed in the EMR    Current Outpatient Medications:     atorvastatin (LIPITOR) 20 MG tablet, Take 1 tablet by mouth Daily. (Patient not taking: Reported on 3/20/2025), Disp: , Rfl:     Carboxymethylcellulose Sodium (EYE DROPS OP), Apply 1 drop to eye(s) as directed by provider 2 (Two) Times a Day., Disp: , Rfl:     hydroCHLOROthiazide 25 MG tablet, Take 1 tablet by mouth Daily. (Patient not taking: Reported on 3/20/2025), Disp: , Rfl:     ibuprofen (ADVIL,MOTRIN) 600 MG tablet, Take 1 tablet by mouth Every 8 (Eight) Hours As Needed for Mild Pain. Please take on day following chemotherapy prior to growth factor injection., Disp: 30 tablet, Rfl: 0    lidocaine-prilocaine (EMLA) 2.5-2.5 % cream, Apply to port-a-cath site 30 minutes prior to arrival at infusion center. Cover with plastic wrap., Disp: 30 g, Rfl: 1    loratadine (CLARITIN) 10 MG tablet, Take 1 tablet by mouth daily on the day following chemotherapy prior to growth factor injection and continue for 7 days.,  Disp: 7 tablet, Rfl: 5    OLANZapine (zyPREXA) 5 MG tablet, Take 1 tablet by mouth for 4 doses starting night of chemotherapy., Disp: 8 tablet, Rfl: 1    ondansetron (ZOFRAN) 8 MG tablet, Take 1 tablet by mouth 3 (Three) Times a Day As Needed for Nausea or Vomiting., Disp: 30 tablet, Rfl: 5    potassium chloride (KLOR-CON M20) 20 MEQ CR tablet, Take 1 tablet by mouth 2 (Two) Times a Day., Disp: 6 tablet, Rfl: 0    prochlorperazine (COMPAZINE) 10 MG tablet, Take 1 tablet by mouth Every 6 (Six) Hours As Needed for Nausea or Vomiting., Disp: 30 tablet, Rfl: 1    vitamin D (ERGOCALCIFEROL) 1.25 MG (41904 UT) capsule capsule, Take 1 capsule by mouth 1 (One) Time Per Week., Disp: 4 capsule, Rfl: 5    ALLERGIES:  No Known Allergies    PHYSICAL EXAM:  Vitals:    04/04/25 1159   BP: 145/74   Pulse: 113   Resp: 20   Temp: 98.1 °F (36.7 °C)   TempSrc: Oral   SpO2: 99%   Weight: 99.6 kg (219 lb 8 oz)   PainSc: 0-No pain         Body surface area is 2.13 meters squared.   Body mass index is 33.38 kg/m².         General:  Awake, alert and oriented, in no distress.  In good spirits.  HEENT:  Pupils are equal, round and reactive to light and accommodation, Extra-ocular movements full, Oropharyx clear, mucous membranes moist  Neck:  No JVD, thyromegaly or lymphadenopathy  CV:  Regular rate and rhythm, no murmurs, rubs or gallops  Resp:  Lungs are clear to auscultation bilaterally, no wheezing  Breast:  S/p mario mastectomies.  Surgical incisions are smooth and intact without nodularity. No axillary LAD on either side.  Abd:  Soft, non-tender, non-distended, bowel sounds present, no palpable organomegaly or masses  Ext:  No clubbing, cyanosis, no lower extremity edema  Back:  Over the lower L back near the waistline, she previously had an area of cellulitis maybe 8x2.5 cm with induration / thickening and erythema of the skin.  There was some element of abscess formation with purulent drainage.  This is now completely resolved without  erythema warmth or fluctuance.  Lymph:  No cervical, supraclavicular, axillary, inguinal or femoral adenopathy  Neuro:  MS as above, CN II-XII intact, grossly non-focal exam      PATHOLOGY:  8/28/24 11/4/24            ENDOSCOPY:        IMAGING:  Mammo Diagnostic Digital Tomosynthesis Bilateral With CAD (07/19/2024 10:42) & US Breast Left Limited (07/19/2024 11:35)   FINDINGS:    Irregular hypodense mass upper outer left breast, anterior depth, 1-2 o'clock radian, with pleomorphic calcifications, measures 3.9 x 2.4 cm (image 40 MLO, image 37 CC).      Irregular lesion with coarse calcification is seen at left 2-3 o'clock radian, posterior depth measuring 1.7 x 1.5 cm (image 26 MLO, image 23 CC).      Enlarged left axillary lymph nodes are seen.      Benign secretory calcifications are seen bilaterally.      No additional suspicious masses, tissue distortion, or suspicious calcifications are identified.      Ultrasound:      Real time, targeted, technologist performed sonographic imaging of the left breast was performed utilizing a multihertz transducer.       Ultrasound was performed in the left upper outer quadrant and axilla.      --- Lobulated mass is seen at left 11-2 o'clock radian, 4 cm from the nipple, central vascular flow, 5.0 x 2.9 x 3.4 cm. This corresponds to the mammogram.    --- Spiculated lesion at 2-3 o'clock radian, 9 cm from the nipple, measuring 1.7 x 1.0 x 1.1 cm. This corresponds to the distortion seen on mammogram.      Enlarging nodes are seen in the left axilla. The largest node measures 2.0 x 2.5 x 1.3 cm.     IMPRESSION:  1.   No mammographic evidence of malignancy is are seen in the right breast.        2.   Breast masses at left 11-3 o'clock radian. These are highly suspicious for malignant process.    3. Enlarging nodes in the left axilla which are suspicious for metastatic disease.         RECOMMENDED FOLLOWUP: Ultrasound guided biopsy of the large mass at 11-2 o'clock  radian, left 2-3 o'clock radian and one of the left axillary lymph nodes.      BI-RADS category 5: Highly suggestive of malignancy.       MRI Abdomen With & Without Contrast (08/14/2024 14:05)   FINDINGS:      LOWER CHEST: Lung bases are clear. Small hiatal hernia.      LIVER: Normal contours. No mass. Vascular shunt in the dome (for example on bolus phase series, image 119). No steatosis. No intrahepatic biliary dilatation. Portal and hepatic veins are patent.      GALLBLADDER: Removed.      COMMON BILE DUCT: Normal caliber. No stones.       SPLEEN: Within normal limits.      PANCREAS: No mass. No pancreatic fluid collections. The pancreatic duct is normal.      ADRENALS: No masses.      KIDNEYS: No solid left renal lesion identified, to correspond to the abnormality described on recent ultrasound. A parapelvic cyst in the left kidney measuring 2 cm. A couple of small cortical cysts in the right kidney measuring up to 1.3 cm. No hydronephrosis.      LYMPH NODES: No adenopathy.      STOMACH, SMALL BOWEL AND COLON: No bowel wall thickening or obstruction.      PERITONEAL CAVITY: No mesenteric stranding or free fluid.      ABDOMINAL AORTA: No aneurysm.      SOFT TISSUES: Normal.      OSSEOUS STRUCTURES: No acute fracture or destructive lesion.     IMPRESSION:  1.   No solid left renal lesion identified, to correspond to the abnormality described on recent ultrasound. Few bilateral renal cysts, including a parapelvic cyst in the left kidney measuring 2 cm.   2.   Additional noncontributory findings described above.     Mammo Diagnostic Digital Tomosynthesis Left With CAD (08/28/2024 09:46) & US Breast Left Limited (08/28/2024 11:11)   FINDINGS:   Findings: There are multiple suspicious findings in the left breast as listed below:     Finding 1: There is a irregular mass with associated calcifications abutting the skin, measuring mammographically 45 x 42 x 30 mm. Targeted left breast ultrasound at the 1:00 position, 4  cm from the revealed a corresponding sonographic irregular mass with calcifications measuring sonographically 4.6 x 2.7 x 3 cm. This extends to the skin. Finding is hard Elastography. Finding is highly suspicious.     Finding 2: There is a irregular mass is associated architectural distortion and coarse central calcification as well as fine: calcifications in the approximate 2:00 position of the left breast middle to posterior depth, 13 cm from the nipple. Targeted left breast ultrasound the 2:00 position 13 cm from the reveals an irregular 8 x 6 x 6 mm mass, with adjacent vascularity, intermediate Elastography.   Finding is highly suspicious.     Finding 3: In the 12:00 position of the left breast there is a 45 mm span of fine pleomorphic calcifications with linear/segmental distribution with associated subtle asymmetry best appreciated in the magnified views, full field CC, slice 40 of 79 as well as ML slice 47/91. These are located approximately 8 cm from nipple. Targeted right breast ultrasound in the left breast at the 12:00 position 8 cm from the nipple identifies an irregular mass, soft on Elastography, measuring 14 x 11 x 12 mm, which appears underestimated sonographically compared to mammographic span of 45 mm linear calcifications. Finding is highly suspicious     Finding 4: There are additional small highly suspicious asymmetries with microcalcifications scattered throughout the inferior left breast and lower inner quadrant in the approximate 6:00-9:00 position middle to posterior depth: The total span of these lower inner quadrant asymmetries measures up to 10 x 7 x 6 cm as best appreciated on CC full-field, slice 13 of 79 and ML slice 27 out of 92. Findings are highly suspicious indicating multicentric disease.     Finding 5: There are  2 abnormal lymph nodes in the left axilla corresponding to completely replaced lymph nodes labeled as lymph node1 and 2 on US; these are highly suspicious with no  demonstration of fatty hilum. This lymph nodes measure respectively 22 mm and 18 mm each, adjacent to each other.       Right breast: A repeat right diagnostic mammogram was not performed as review of outside right breast mammogram did not reveal any suspicious areas of concern.     IMPRESSION:  BI-RADS Code: BI-RADS 5, Highly suggestive of malignancy.   Multicentric left breast disease with highly suspicious at least 2 left axillary lymph nodes     RECOMMENDATIONS:   Left Breast Recommendations: Ultrasound Guided Breast Biopsy for findings 1 and 2 to confirm multicentric disease.     Fine Needle Aspiration     Right diagnostic mammogram in one year.     US Axilla Left (09/12/2024 12:02)   FINDINGS:   Final mammographic images demonstrate the localizer tag to be located within the left axillary lymph node and adjacent to the twirl clip.     Left Breast Density: The breast tissue is heterogeneously dense, which may obscure small masses.     IMPRESSION:  Successful localization of the left axillary lymph node with a pink smartclip.     MR Head w and wo IV Contrast (09/25/2024 08:32)   Findings:     No enhancing lesions to suggest intracranial metastatic disease.     No evidence of restricted diffusion to suggest acute territorial infarct.     No evidence of mass effect, midline shift, ration, hydrocephalus.     No acute appearing intracranial hemorrhage.  No extra-axial fluid collections.  Scattered periventricular and subcortical T2/FLAIR hyperintense foci within the supratentorial brain, probably related to small vessel ischemic changes.  Age-appropriate ventricular size and configuration.     Basal cisterns are patent.  Major intracranial flow voids are preserved.     Paranasal sinuses  are clear.  Mastoid cells are clear.  Postsurgical changes of the orbits.     3 mm enhancing lesion within the left frontal bone could be related to hemangioma (series 6 image 20, series 13 image 20), although underlying metastatic  disease cannot be totally excluded.  Attention to this area on follow-up imaging.     No additional calvarial lesions.     Impression:  No abnormal intraparenchymal postcontrast enhancement to suggest intraparenchymal metastatic disease.      No acute intracranial process.  Sequelae of small vessel ischemic changes.     3 mm enhancing lesion within the left frontal bone could be related to hemangioma (series 6 image 20, series 13 image 20), although underlying metastatic disease cannot be totally excluded.  Attention to this area on follow-up imaging.     CT Abdomen Pelvis With Contrast (09/25/2024 12:40)   FINDINGS:   Mediastinum and Pleura: No mediastinal or hilar adenopathy. No pleural or pericardial effusion.     Lungs: 5 mm peripheral right upper lobe nodule, image 49 of series 3. Subpleural 6 mm peripheral right middle lobe nodule, image 58 series 3. Subpleural 6 mm right basal nodule, image 73 of series 3. No acute airspace disease. The central airways are unremarkable.     Abdomen and Pelvis: No focal hepatic lesions. Gallbladder is removed. No biliary dilatation. No splenic lesions. Normal adrenal glands. No pancreatic lesions or peripancreatic stranding. Exophytic right renal cortical cysts. No suspicious renal parenchymal lesions. No renal collecting system dilatation.     Calcifications involving abdominal aorta, bilateral iliac vessels and SMA, with associated moderate to significant stenosis of the distal SMA trunk.     No enlarged lymph nodes. No mesenteric or retroperitoneal nodularity. No free fluid or air within the abdomen or pelvis.     Unremarkable stomach. Normal caliber of small and large bowel. Distal colonic diverticulosis without evidence of complications.     Urinary bladder is within normal limits. No abnormal soft tissue densities in the regions of adnexa.     Musculoskeletal: Right breast is only partially included, without discrete suspicious lesions noted. There is a biopsied lesion  within left breast, image 34 of series 2, measures up to 2.1 cm. There is also a left axillary lymphadenopathy, status post biopsy, with example of left axillary lymph node on image 37 of series 2 measuring up to 1.7 cm.     No aggressive osseous lesions or acute fractures.     IMPRESSION:  Left breast lesion and left axillary lymphadenopathy, representing known breast cancer with metastasis.   Several peripheral right lung nodules, nonspecific.   No enlarged mediastinal lymph nodes.   No suspicious lesions within the abdomen or pelvis.   No suspicious osseous lesions are identified.     CT Chest With Contrast Diagnostic (09/25/2024 12:40)   FINDINGS:   Mediastinum and Pleura: No mediastinal or hilar adenopathy. No pleural or pericardial effusion.     Lungs: 5 mm peripheral right upper lobe nodule, image 49 of series 3. Subpleural 6 mm peripheral right middle lobe nodule, image 58 series 3. Subpleural 6 mm right basal nodule, image 73 of series 3. No acute airspace disease. The central airways are unremarkable.     Abdomen and Pelvis: No focal hepatic lesions. Gallbladder is removed. No biliary dilatation. No splenic lesions. Normal adrenal glands. No pancreatic lesions or peripancreatic stranding. Exophytic right renal cortical cysts. No suspicious renal parenchymal lesions. No renal collecting system dilatation.     Calcifications involving abdominal aorta, bilateral iliac vessels and SMA, with associated moderate to significant stenosis of the distal SMA trunk.     No enlarged lymph nodes. No mesenteric or retroperitoneal nodularity. No free fluid or air within the abdomen or pelvis.     Unremarkable stomach. Normal caliber of small and large bowel. Distal colonic diverticulosis without evidence of complications.     Urinary bladder is within normal limits. No abnormal soft tissue densities in the regions of adnexa.     Musculoskeletal: Right breast is only partially included, without discrete suspicious lesions  noted. There is a biopsied lesion within left breast, image 34 of series 2, measures up to 2.1 cm. There is also a left axillary lymphadenopathy, status post biopsy, with example of left axillary lymph node on image 37 of series 2 measuring up to 1.7 cm.     No aggressive osseous lesions or acute fractures.     IMPRESSION:  Left breast lesion and left axillary lymphadenopathy, representing known breast cancer with metastasis.   Several peripheral right lung nodules, nonspecific.   No enlarged mediastinal lymph nodes.   No suspicious lesions within the abdomen or pelvis.   No suspicious osseous lesions are identified.     NM Bone Scan Whole Body (09/25/2024 13:41)   FINDINGS:   Bones: No sites of focal increased (hot) and decreased (cold) uptake in axial and appendicular skeleton to suggest bone metastasis.     Bones/Joints: Sites of mild uptake consistent with benign arthritic, degenerative or post traumatic changes.     Soft-tissues: Asymmetric mild uptake in the left breast soft tissues relative to the right. Two kidneys visualized.     IMPRESSION:  No evidence of osseous metastatic disease.     Adult Transthoracic Echo Complete W/ Cont if Necessary Per Protocol (01/03/2025 09:52)   Interpretation Summary    Left ventricular systolic function is normal. Estimated left ventricular EF = 55%    Left ventricular wall thickness is consistent with moderate concentric hypertrophy.    Left ventricular diastolic function is consistent with (grade I) impaired relaxation.    Estimated right ventricular systolic pressure from tricuspid regurgitation is normal (<35 mmHg).    RECENT LABS:  Lab Results   Component Value Date    WBC 7.50 04/04/2025    HGB 10.0 (L) 04/04/2025    HCT 32.5 (L) 04/04/2025    MCV 98.8 (H) 04/04/2025    RDW 21.2 (H) 04/04/2025     04/04/2025    NEUTRORELPCT 78.9 (H) 04/04/2025    LYMPHORELPCT 8.0 (L) 04/04/2025    MONORELPCT 11.2 04/04/2025    EOSRELPCT 1.1 04/04/2025    BASORELPCT 0.4  04/04/2025    NEUTROABS 5.92 04/04/2025    LYMPHSABS 0.60 (L) 04/04/2025       Lab Results   Component Value Date     04/04/2025    K 4.1 04/04/2025    CO2 25.0 04/04/2025     04/04/2025    BUN 19 04/04/2025    CREATININE 0.79 04/04/2025    GLUCOSE 124 (H) 04/04/2025    CALCIUM 9.2 04/04/2025    ALKPHOS 120 (H) 04/04/2025    AST 26 04/04/2025    ALT 20 04/04/2025    BILITOT 0.4 04/04/2025    ALBUMIN 3.9 04/04/2025    PROTEINTOT 6.7 04/04/2025    MG 1.9 01/22/2025     Lab Results   Component Value Date    FERRITIN 384.20 (H) 02/28/2025    IRON 92 02/28/2025    TIBC 352 02/28/2025    LABIRON 26 02/28/2025    FLSGWXPQ90 335 11/25/2024    FOLATE 7.32 11/25/2024     Lab Results   Component Value Date    TSH 2.790 11/25/2024     Lab Results   Component Value Date    EPGL00SN 26.3 (L) 11/25/2024     Lab Results   Component Value Date    LABCA2 44.6 (H) 11/25/2024      Lab Results   Component Value Date     37.7 (H) 11/25/2024       ASSESSMENT & PLAN:  Kristyn Goff is a very pleasant 75 y.o. female with Stage IIIA (pT2(m)N2aMX moderately differentiated, multifocal invasive ductal carcinoma with micropapillary features.  Tumors were 30 mm and 4 mm in diameter.  There was associated high grade DCIS.  Surgical margins were clear.  She had involvement of 3/4 SLN and 1/1 other LN.  Largest LN metastasis was 22 mm with extranodal extension.  Tumor was >95% 3+ ER+, 81-90% 2+ FL+, HER-2/Estelita - (0).    1.  Left Breast Cancer:  - Patient and family report that the mass was present for about 15 years before she had it evaluated.  - She is s/p successful L mastectomy w/ clear margins and prophylactic contralateral mastectomy performed by Dr. Winnie He 11/4/24.  - She is now well-healed from her surgery.  - Baseline echocardiogram performed 1/3/2025 showed low normal LVEF 55%.  - Dr. Tellez recommended adjuvant chemotherapy to consist of DDAC x 4 with growth factor support followed by weekly Taxol x 12. We  discussed potential risks benefits and side effects and she decided to proceed.  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  - Following adjuvant radiation she will require hormonal therapy.  Will plan to get DEXA closer to that time.  - She received her first cycle of dose dense AC chemotherapy on 1/16/2025.  Unfortunately shortly thereafter she came down with norovirus infection with prolific diarrhea. This resolved.   -  She completed ddAC with only and intermittent nausea controlled with antiemetics.  -  She is back today to begin her weekly Taxol treatments.  Today is cycle 1.  -  Exam today without cause for concern. Will proceed.  -  She will follow up with Dr. Tellez in ~1 week prior to C2 weekly Taxol.    2.  Abscess L back:  - Resolved.    3.  Abnormal imaging findings:  -  She had CT CAP performed 9/25/24 which showed several tiny, nonspecific lung nodules in the R lung measuring 5-6 mm.  These were previously described on CTPE imaging 4/30/23.  Will need f/u CT imaging after an interval.  -  She also had 3 mm enhancing lesion in the L frontal bone noted on Brain MRI 9/25/24.  Could be c/w hemangioma.  Could not completely r/o metastatic lesion.  Bone scan 9/25/24 was negative.     -  Will plan to reimage after an interval.    4.  Diarrhea  - Turned out to be related to a norovirus infection from which she is now well recovered.  Diarrhea has resolved.    5. Normocytic anemia  6. Generalized weakness/fatigue  - Hg has been trending down (9.7 today). I suspect that this is related to chemotherapy and we discussed this. This is likely contributing to fatigue, generalized weakness. She also endorses shortness of breath with exertion.  - Iron is replete. Vitamin B12 and folate pending. Will monitor.    7.  Prophylaxis:  -  Kristyn JONES Samples did not receive 2024 flu vaccine.  This was recommended but declined.  -  Kristyn JONES Samples did not  receive Prevnar vaccine.  This was recommended but declined.  -  Kristyn Goff did not receive COVID vaccine.  This was recommended but declined.    8. ACO / DANK/Other  Quality measures  -  Kristyn Goff did not receive 2024 flu vaccine.  This was recommended.  -  Kristyn Goff reports a pain score of 0.   -  Current outpatient and discharge medications have been reconciled for the patient.  Reviewed by: JOHN Rebolledo    9. Follow Up:  -  Proceed with C1 Taxol today.  -  She will follow up with Dr. Tellez w/ C2 weekly Taxol w/ CBC, CMP.        I spent 30 minutes with Kristyn Goff today.  In the office today, more than 50% of this time was spent face-to-face with her  in counseling / coordination of care, reviewing her interim medical history and counseling on the current treatment plan.  All questions were answered to her satisfaction.           Electronically Signed by: JOHN Rebolledo       CC:     JOHN Toledo DO Veronica Morgan Jones, MD

## 2025-04-09 NOTE — PROGRESS NOTES
"NAME: Kristyn Goff    : 1950    DATE: 2025    DIAGNOSIS: Malignant neoplasm of upper-outer quadrant of left breast in female, estrogen receptor positive     TREATMENT HISTORY:   Left mastectomy and SLNBx  with Prophylactic Contralateral Mastectomy performed by Dr. Winnie He 23    2.      3.            CHIEF COMPLAINT:  Follow Up Breast Cancer/Toxicity Check    HISTORY OF PRESENT ILLNESS:   Kristyn Goff is a very pleasant 75 y.o. female who is being seen today in the presence of her son and daughter-in-law at the request of Maria Esther He MD for evaluation and treatment of breast cancer.  Ms. Goff says she first noticed a \"knot\" in her left breast about 15 years prior around .  Then it was maybe the size of a marble.  She says it enlarged over time until it was about the size of a lime.  She was seeing her PEP, JOHN Toledo who noticed the lump during an exam and ordered further evaluation.  She had biopsy of two areas in the L breast as well as L axillary LN and this showed invasive moderately differentiated ductal carcinoma with micropapillary features Tumor was >95% 3+ ER+, 81-90% 2+ MS+, HER-2/Estelita - (0).  L axillary LN was involved.  Dr. He took her for L mastectomy w/ SLNBx and prophylactic contralateral mastectomy as below on 24.  Patholgy showed a Stage IIIA multifocal invasive ductal carcinoma of the left breast with micropapillary features. Tumors were 30 mm, 4 mm.  There was associated high grade DCIS.  4/5 LN were involved (3/4 SLN and 1/1 other LN), the largest being 22 mm with extranodal extension.  R breast was without malignancy.    Ms. Goff is recovering reasonably well.  She has healed somewhat slowly and still has L axillary drain in place but she is making steady progress and now says she has minimal output from the axillary drain.  She is to see Dr. He on Wednesday.   She denies weight loss.  No fevers or chills. No chest pain or " "shortness of breath. No abdominal pain, n/v/d/c, no rectal bleeding.  No bony pain. No headaches or visual disturbance.  They do complain that she has developed a \"place\" on her back they are concerned about that they want me to look at today.    Of note, prior to her surgery she had CT imaging as below which showed several small, nonspecific lung nodules in the R lung (5-6 mm).  Of note similar finding was noted on CTPE protocol from 23).  MRI of the brain also showed a 3 mm enhancing lesion in the L frontal bone which could represent hemangioma although metastatic lesion could not be completely excluded.  Bone scan was clear.        INTERVAL HISTORY:  Ms. Goff presents today for follow up of breast cancer and toxicity check.  She is started Taxol therapy which she is tolerating much better than she tolerated AC chemotherapy.  She says her taste buds are back and her appetite is improved.  She did have a little tingling in her feet after her first cycle of Taxol but this is resolved.  She has purchased cryotherapy mittens and booties which she plans to use as of today.  She had a norovirus infection which has thankfully resolved and she is no longer having diarrhea.  She is overall doing well without complaint today.        PAST MEDICAL HISTORY:  Past Medical History:   Diagnosis Date    Breast cancer     Elevated cholesterol     GERD (gastroesophageal reflux disease)     High cholesterol    -  H/o Diverticulosis  -  H/o Hyperparathyroidism    Risk Factors:  Age of Menarche: 7 yo  Age of Menopause: around age 54  Prior Breast Disease: Denies prior bx but says this lesion was present for maybe 15 yrs before she sought attention.  Pregnancies:    Age of 1st pregnancy:23  Family History of Breast Cancer: denies  Family History of Ovarian Cancer:Denies  History of HRT use:  Denies       PAST SURGICAL HISTORY:  Past Surgical History:   Procedure Laterality Date    CATARACT EXTRACTION Right     COLONOSCOPY   "    GALLBLADDER SURGERY      MASTECTOMY Bilateral     PARATHYROIDECTOMY      VENOUS ACCESS DEVICE (PORT) INSERTION Right 1/13/2025    Procedure: INSERTION VENOUS ACCESS DEVICE;  Surgeon: Karen Johnston MD;  Location: [facility];  Service: General;  Laterality: Right;   -  Parathyrodectomy  -  S/p mario cataract surgery w/ implant placement  -  CCU    FAMILY HISTORY:  Family History   Problem Relation Age of Onset    Heart attack Father     Hypertension Sister     Heart attack Sister     Diabetes Sister    -  Denies family h/o malignancy of any kind    SOCIAL HISTORY:  Social History     Socioeconomic History    Marital status:    Tobacco Use    Smoking status: Former     Types: Cigarettes    Smokeless tobacco: Never   Vaping Use    Vaping status: Never Used   Substance and Sexual Activity    Alcohol use: Never    Drug use: Never    Sexual activity: Defer   -  .  Lives alone.  Used to own a flower shop but retired around 2012.  Former smoker, quit around age 35 yo      REVIEW OF SYSTEMS:   A comprehensive 14 point review of systems was performed.  Significant findings as mentioned above.  All other systems reviewed and are negative.      MEDICATIONS:  The current medication list was reviewed in the EMR    Current Outpatient Medications:     atorvastatin (LIPITOR) 20 MG tablet, Take 1 tablet by mouth Daily., Disp: , Rfl:     Carboxymethylcellulose Sodium (EYE DROPS OP), Apply 1 drop to eye(s) as directed by provider 2 (Two) Times a Day., Disp: , Rfl:     lidocaine-prilocaine (EMLA) 2.5-2.5 % cream, Apply to port-a-cath site 30 minutes prior to arrival at infusion center. Cover with plastic wrap., Disp: 30 g, Rfl: 1    loratadine (CLARITIN) 10 MG tablet, Take 1 tablet by mouth daily on the day following chemotherapy prior to growth factor injection and continue for 7 days., Disp: 7 tablet, Rfl: 5    ondansetron (ZOFRAN) 8 MG tablet, Take 1 tablet by mouth 3 (Three) Times a Day As Needed for Nausea or  "Vomiting., Disp: 30 tablet, Rfl: 5    potassium chloride (KLOR-CON M20) 20 MEQ CR tablet, Take 1 tablet by mouth 2 (Two) Times a Day., Disp: 6 tablet, Rfl: 0    prochlorperazine (COMPAZINE) 10 MG tablet, Take 1 tablet by mouth Every 6 (Six) Hours As Needed for Nausea or Vomiting., Disp: 30 tablet, Rfl: 1    vitamin D (ERGOCALCIFEROL) 1.25 MG (90044 UT) capsule capsule, Take 1 capsule by mouth 1 (One) Time Per Week., Disp: 4 capsule, Rfl: 5    hydroCHLOROthiazide 25 MG tablet, Take 1 tablet by mouth Daily. (Patient not taking: Reported on 2/28/2025), Disp: , Rfl:     ibuprofen (ADVIL,MOTRIN) 600 MG tablet, Take 1 tablet by mouth Every 8 (Eight) Hours As Needed for Mild Pain. Please take on day following chemotherapy prior to growth factor injection. (Patient not taking: Reported on 4/11/2025), Disp: 30 tablet, Rfl: 0    OLANZapine (zyPREXA) 5 MG tablet, Take 1 tablet by mouth for 4 doses starting night of chemotherapy., Disp: 8 tablet, Rfl: 1    ALLERGIES:  No Known Allergies    PHYSICAL EXAM:  Vitals:    04/11/25 1201   BP: 131/68   Pulse: 115   Resp: 20   Temp: 98 °F (36.7 °C)   TempSrc: Temporal   SpO2: 92%   Weight: 98.9 kg (218 lb)   Height: 172.7 cm (68\")   PainSc: 0-No pain       Body surface area is 2.12 meters squared.   Body mass index is 33.15 kg/m².   ECOG score: 0     General:  Awake, alert and oriented, in no distress.  In good spirits.  HEENT:  Pupils are equal, round and reactive to light and accommodation, Extra-ocular movements full, Oropharyx clear, mucous membranes moist  Neck:  No JVD, thyromegaly or lymphadenopathy  CV:  Regular rate and rhythm, no murmurs, rubs or gallops  Resp:  Lungs are clear to auscultation bilaterally, no crackles.  PAC in R chest c/d/i  Breast:  S/p mario mastectomies.  Surgical incisions are smooth and intact without nodularity. No axillary LAD on either side.   Abd:  Soft, non-tender, non-distended, bowel sounds present, no palpable organomegaly or masses  Ext:  No " clubbing, cyanosis, no lower extremity edema  Back:  Over the lower L back near the waistline, she previously had an area of cellulitis maybe 8x2.5 cm with induration / thickening and erythema of the skin.  There was some element of abscess formation with purulent drainage.  This is now completely resolved without erythema warmth or fluctuance.  Lymph:  No cervical, supraclavicular, axillary, inguinal or femoral adenopathy  Neuro:  MS as above, CN II-XII intact, grossly non-focal exam      PATHOLOGY:  8/28/24 11/4/24            ENDOSCOPY:        IMAGING:  Mammo Diagnostic Digital Tomosynthesis Bilateral With CAD (07/19/2024 10:42) & US Breast Left Limited (07/19/2024 11:35)   FINDINGS:    Irregular hypodense mass upper outer left breast, anterior depth, 1-2 o'clock radian, with pleomorphic calcifications, measures 3.9 x 2.4 cm (image 40 MLO, image 37 CC).      Irregular lesion with coarse calcification is seen at left 2-3 o'clock radian, posterior depth measuring 1.7 x 1.5 cm (image 26 MLO, image 23 CC).      Enlarged left axillary lymph nodes are seen.      Benign secretory calcifications are seen bilaterally.      No additional suspicious masses, tissue distortion, or suspicious calcifications are identified.      Ultrasound:      Real time, targeted, technologist performed sonographic imaging of the left breast was performed utilizing a multihertz transducer.       Ultrasound was performed in the left upper outer quadrant and axilla.      --- Lobulated mass is seen at left 11-2 o'clock radian, 4 cm from the nipple, central vascular flow, 5.0 x 2.9 x 3.4 cm. This corresponds to the mammogram.    --- Spiculated lesion at 2-3 o'clock radian, 9 cm from the nipple, measuring 1.7 x 1.0 x 1.1 cm. This corresponds to the distortion seen on mammogram.      Enlarging nodes are seen in the left axilla. The largest node measures 2.0 x 2.5 x 1.3 cm.     IMPRESSION:  1.   No mammographic evidence of malignancy is  are seen in the right breast.        2.   Breast masses at left 11-3 o'clock radian. These are highly suspicious for malignant process.    3. Enlarging nodes in the left axilla which are suspicious for metastatic disease.         RECOMMENDED FOLLOWUP: Ultrasound guided biopsy of the large mass at 11-2 o'clock radian, left 2-3 o'clock radian and one of the left axillary lymph nodes.      BI-RADS category 5: Highly suggestive of malignancy.       MRI Abdomen With & Without Contrast (08/14/2024 14:05)   FINDINGS:      LOWER CHEST: Lung bases are clear. Small hiatal hernia.      LIVER: Normal contours. No mass. Vascular shunt in the dome (for example on bolus phase series, image 119). No steatosis. No intrahepatic biliary dilatation. Portal and hepatic veins are patent.      GALLBLADDER: Removed.      COMMON BILE DUCT: Normal caliber. No stones.       SPLEEN: Within normal limits.      PANCREAS: No mass. No pancreatic fluid collections. The pancreatic duct is normal.      ADRENALS: No masses.      KIDNEYS: No solid left renal lesion identified, to correspond to the abnormality described on recent ultrasound. A parapelvic cyst in the left kidney measuring 2 cm. A couple of small cortical cysts in the right kidney measuring up to 1.3 cm. No hydronephrosis.      LYMPH NODES: No adenopathy.      STOMACH, SMALL BOWEL AND COLON: No bowel wall thickening or obstruction.      PERITONEAL CAVITY: No mesenteric stranding or free fluid.      ABDOMINAL AORTA: No aneurysm.      SOFT TISSUES: Normal.      OSSEOUS STRUCTURES: No acute fracture or destructive lesion.     IMPRESSION:  1.   No solid left renal lesion identified, to correspond to the abnormality described on recent ultrasound. Few bilateral renal cysts, including a parapelvic cyst in the left kidney measuring 2 cm.   2.   Additional noncontributory findings described above.     Mammo Diagnostic Digital Tomosynthesis Left With CAD (08/28/2024 09:46) & US Breast Left Limited  (08/28/2024 11:11)   FINDINGS:   Findings: There are multiple suspicious findings in the left breast as listed below:     Finding 1: There is a irregular mass with associated calcifications abutting the skin, measuring mammographically 45 x 42 x 30 mm. Targeted left breast ultrasound at the 1:00 position, 4 cm from the revealed a corresponding sonographic irregular mass with calcifications measuring sonographically 4.6 x 2.7 x 3 cm. This extends to the skin. Finding is hard Elastography. Finding is highly suspicious.     Finding 2: There is a irregular mass is associated architectural distortion and coarse central calcification as well as fine: calcifications in the approximate 2:00 position of the left breast middle to posterior depth, 13 cm from the nipple. Targeted left breast ultrasound the 2:00 position 13 cm from the reveals an irregular 8 x 6 x 6 mm mass, with adjacent vascularity, intermediate Elastography.   Finding is highly suspicious.     Finding 3: In the 12:00 position of the left breast there is a 45 mm span of fine pleomorphic calcifications with linear/segmental distribution with associated subtle asymmetry best appreciated in the magnified views, full field CC, slice 40 of 79 as well as ML slice 47/91. These are located approximately 8 cm from nipple. Targeted right breast ultrasound in the left breast at the 12:00 position 8 cm from the nipple identifies an irregular mass, soft on Elastography, measuring 14 x 11 x 12 mm, which appears underestimated sonographically compared to mammographic span of 45 mm linear calcifications. Finding is highly suspicious     Finding 4: There are additional small highly suspicious asymmetries with microcalcifications scattered throughout the inferior left breast and lower inner quadrant in the approximate 6:00-9:00 position middle to posterior depth: The total span of these lower inner quadrant asymmetries measures up to 10 x 7 x 6 cm as best appreciated on CC  full-field, slice 13 of 79 and ML slice 27 out of 92. Findings are highly suspicious indicating multicentric disease.     Finding 5: There are  2 abnormal lymph nodes in the left axilla corresponding to completely replaced lymph nodes labeled as lymph node1 and 2 on US; these are highly suspicious with no demonstration of fatty hilum. This lymph nodes measure respectively 22 mm and 18 mm each, adjacent to each other.       Right breast: A repeat right diagnostic mammogram was not performed as review of outside right breast mammogram did not reveal any suspicious areas of concern.     IMPRESSION:  BI-RADS Code: BI-RADS 5, Highly suggestive of malignancy.   Multicentric left breast disease with highly suspicious at least 2 left axillary lymph nodes     RECOMMENDATIONS:   Left Breast Recommendations: Ultrasound Guided Breast Biopsy for findings 1 and 2 to confirm multicentric disease.     Fine Needle Aspiration     Right diagnostic mammogram in one year.     US Axilla Left (09/12/2024 12:02)   FINDINGS:   Final mammographic images demonstrate the localizer tag to be located within the left axillary lymph node and adjacent to the twirl clip.     Left Breast Density: The breast tissue is heterogeneously dense, which may obscure small masses.     IMPRESSION:  Successful localization of the left axillary lymph node with a pink smartclip.     MR Head w and wo IV Contrast (09/25/2024 08:32)   Findings:     No enhancing lesions to suggest intracranial metastatic disease.     No evidence of restricted diffusion to suggest acute territorial infarct.     No evidence of mass effect, midline shift, ration, hydrocephalus.     No acute appearing intracranial hemorrhage.  No extra-axial fluid collections.  Scattered periventricular and subcortical T2/FLAIR hyperintense foci within the supratentorial brain, probably related to small vessel ischemic changes.  Age-appropriate ventricular size and configuration.     Basal cisterns are  patent.  Major intracranial flow voids are preserved.     Paranasal sinuses  are clear.  Mastoid cells are clear.  Postsurgical changes of the orbits.     3 mm enhancing lesion within the left frontal bone could be related to hemangioma (series 6 image 20, series 13 image 20), although underlying metastatic disease cannot be totally excluded.  Attention to this area on follow-up imaging.     No additional calvarial lesions.     Impression:  No abnormal intraparenchymal postcontrast enhancement to suggest intraparenchymal metastatic disease.      No acute intracranial process.  Sequelae of small vessel ischemic changes.     3 mm enhancing lesion within the left frontal bone could be related to hemangioma (series 6 image 20, series 13 image 20), although underlying metastatic disease cannot be totally excluded.  Attention to this area on follow-up imaging.     CT Abdomen Pelvis With Contrast (09/25/2024 12:40)   FINDINGS:   Mediastinum and Pleura: No mediastinal or hilar adenopathy. No pleural or pericardial effusion.     Lungs: 5 mm peripheral right upper lobe nodule, image 49 of series 3. Subpleural 6 mm peripheral right middle lobe nodule, image 58 series 3. Subpleural 6 mm right basal nodule, image 73 of series 3. No acute airspace disease. The central airways are unremarkable.     Abdomen and Pelvis: No focal hepatic lesions. Gallbladder is removed. No biliary dilatation. No splenic lesions. Normal adrenal glands. No pancreatic lesions or peripancreatic stranding. Exophytic right renal cortical cysts. No suspicious renal parenchymal lesions. No renal collecting system dilatation.     Calcifications involving abdominal aorta, bilateral iliac vessels and SMA, with associated moderate to significant stenosis of the distal SMA trunk.     No enlarged lymph nodes. No mesenteric or retroperitoneal nodularity. No free fluid or air within the abdomen or pelvis.     Unremarkable stomach. Normal caliber of small and large  bowel. Distal colonic diverticulosis without evidence of complications.     Urinary bladder is within normal limits. No abnormal soft tissue densities in the regions of adnexa.     Musculoskeletal: Right breast is only partially included, without discrete suspicious lesions noted. There is a biopsied lesion within left breast, image 34 of series 2, measures up to 2.1 cm. There is also a left axillary lymphadenopathy, status post biopsy, with example of left axillary lymph node on image 37 of series 2 measuring up to 1.7 cm.     No aggressive osseous lesions or acute fractures.     IMPRESSION:  Left breast lesion and left axillary lymphadenopathy, representing known breast cancer with metastasis.   Several peripheral right lung nodules, nonspecific.   No enlarged mediastinal lymph nodes.   No suspicious lesions within the abdomen or pelvis.   No suspicious osseous lesions are identified.     CT Chest With Contrast Diagnostic (09/25/2024 12:40)   FINDINGS:   Mediastinum and Pleura: No mediastinal or hilar adenopathy. No pleural or pericardial effusion.     Lungs: 5 mm peripheral right upper lobe nodule, image 49 of series 3. Subpleural 6 mm peripheral right middle lobe nodule, image 58 series 3. Subpleural 6 mm right basal nodule, image 73 of series 3. No acute airspace disease. The central airways are unremarkable.     Abdomen and Pelvis: No focal hepatic lesions. Gallbladder is removed. No biliary dilatation. No splenic lesions. Normal adrenal glands. No pancreatic lesions or peripancreatic stranding. Exophytic right renal cortical cysts. No suspicious renal parenchymal lesions. No renal collecting system dilatation.     Calcifications involving abdominal aorta, bilateral iliac vessels and SMA, with associated moderate to significant stenosis of the distal SMA trunk.     No enlarged lymph nodes. No mesenteric or retroperitoneal nodularity. No free fluid or air within the abdomen or pelvis.     Unremarkable stomach.  Normal caliber of small and large bowel. Distal colonic diverticulosis without evidence of complications.     Urinary bladder is within normal limits. No abnormal soft tissue densities in the regions of adnexa.     Musculoskeletal: Right breast is only partially included, without discrete suspicious lesions noted. There is a biopsied lesion within left breast, image 34 of series 2, measures up to 2.1 cm. There is also a left axillary lymphadenopathy, status post biopsy, with example of left axillary lymph node on image 37 of series 2 measuring up to 1.7 cm.     No aggressive osseous lesions or acute fractures.     IMPRESSION:  Left breast lesion and left axillary lymphadenopathy, representing known breast cancer with metastasis.   Several peripheral right lung nodules, nonspecific.   No enlarged mediastinal lymph nodes.   No suspicious lesions within the abdomen or pelvis.   No suspicious osseous lesions are identified.     NM Bone Scan Whole Body (09/25/2024 13:41)   FINDINGS:   Bones: No sites of focal increased (hot) and decreased (cold) uptake in axial and appendicular skeleton to suggest bone metastasis.     Bones/Joints: Sites of mild uptake consistent with benign arthritic, degenerative or post traumatic changes.     Soft-tissues: Asymmetric mild uptake in the left breast soft tissues relative to the right. Two kidneys visualized.     IMPRESSION:  No evidence of osseous metastatic disease.     Adult Transthoracic Echo Complete W/ Cont if Necessary Per Protocol (01/03/2025 09:52)   Interpretation Summary    Left ventricular systolic function is normal. Estimated left ventricular EF = 55%    Left ventricular wall thickness is consistent with moderate concentric hypertrophy.    Left ventricular diastolic function is consistent with (grade I) impaired relaxation.    Estimated right ventricular systolic pressure from tricuspid regurgitation is normal (<35 mmHg).    RECENT LABS:  Lab Results   Component Value Date     WBC 7.50 04/04/2025    HGB 10.0 (L) 04/04/2025    HCT 32.5 (L) 04/04/2025    MCV 98.8 (H) 04/04/2025    RDW 21.2 (H) 04/04/2025     04/04/2025    NEUTRORELPCT 78.9 (H) 04/04/2025    LYMPHORELPCT 8.0 (L) 04/04/2025    MONORELPCT 11.2 04/04/2025    EOSRELPCT 1.1 04/04/2025    BASORELPCT 0.4 04/04/2025    NEUTROABS 5.92 04/04/2025    LYMPHSABS 0.60 (L) 04/04/2025       Lab Results   Component Value Date     04/04/2025    K 4.1 04/04/2025    CO2 25.0 04/04/2025     04/04/2025    BUN 19 04/04/2025    CREATININE 0.79 04/04/2025    GLUCOSE 124 (H) 04/04/2025    CALCIUM 9.2 04/04/2025    ALKPHOS 120 (H) 04/04/2025    AST 26 04/04/2025    ALT 20 04/04/2025    BILITOT 0.4 04/04/2025    ALBUMIN 3.9 04/04/2025    PROTEINTOT 6.7 04/04/2025    MG 1.9 01/22/2025     Lab Results   Component Value Date    FERRITIN 384.20 (H) 02/28/2025    IRON 92 02/28/2025    TIBC 352 02/28/2025    LABIRON 26 02/28/2025    [id number] 335 11/25/2024    FOLATE 7.32 11/25/2024     Lab Results   Component Value Date    TSH 2.790 11/25/2024     Lab Results   Component Value Date    POON40DN 26.3 (L) 11/25/2024     Lab Results   Component Value Date    LABCA2 44.6 (H) 11/25/2024      Lab Results   Component Value Date     37.7 (H) 11/25/2024       ASSESSMENT & PLAN:  Kristyn Goff is a very pleasant 75 y.o. female with Stage IIIA (pT2(m)N2aMX moderately differentiated, multifocal invasive ductal carcinoma with micropapillary features.  Tumors were 30 mm and 4 mm in diameter.  There was associated high grade DCIS.  Surgical margins were clear.  She had involvement of 3/4 SLN and 1/1 other LN.  Largest LN metastasis was 22 mm with extranodal extension.  Tumor was >95% 3+ ER+, 81-90% 2+ MS+, HER-2/Estelita - (0).    1.  Left Breast Cancer:  - Patient and family report that the mass was present for about 15 years before she had it evaluated.  - She is s/p successful L mastectomy w/ clear margins and prophylactic contralateral  mastectomy performed by Dr. Winnie He 11/4/24.  - She is now well-healed from her surgery.  - Baseline echocardiogram performed 1/3/2025 showed low normal LVEF 55%.  - We recommended adjuvant chemotherapy to consist of DDAC x 4 with growth factor support followed by weekly Taxol x 12. We discussed potential risks benefits and side effects and she decided to proceed.  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  - Following adjuvant radiation she will require hormonal therapy.  Will plan to get DEXA closer to that time.  - She received her first cycle of dose dense AC chemotherapy on 1/16/2025.  Unfortunately shortly thereafter she came down with norovirus infection with prolific diarrhea. This thankfully resolved.   -  She completed DDAC with only and intermittent nausea controlled with antiemetics.  -  She has now started low dose weekly Taxol therapy which she is tolerating well. .  -  Exam today without cause for concern. Will proceed.  -  Encouraged use of cryotherapy mittens/booties which she has purchased.  -  F/u with APRN in 2 weeks and me in 4 weeks.  CBCD, CMP, TSH, Vit D when I see her.      2.  Abscess L back:  - Resolved.    3.  Abnormal imaging findings:  -  She had CT CAP performed 9/25/24 which showed several tiny, nonspecific lung nodules in the R lung measuring 5-6 mm.  These were previously described on CTPE imaging 4/30/23.  Will need f/u CT imaging after an interval.  -  She also had 3 mm enhancing lesion in the L frontal bone noted on Brain MRI 9/25/24.  Could be c/w hemangioma.  Could not completely r/o metastatic lesion.  Bone scan 9/25/24 was negative.     -  Will plan to reimage after an interval.    4.  Diarrhea  - Turned out to be related to a norovirus infection from which she is now well recovered.  Diarrhea has resolved.    5. Normocytic anemia:  - Likely related to chemotherapy. Nutritionally replete. Will repeat  CBC today.    6. Generalized weakness/fatigue  -  Improved with weekly TAxol.  Will monitor.    7.  Prophylaxis:  -  Kristyn JONES Samples did not receive 2024 flu vaccine.  This was recommended but declined.  -  Kristyn L Samples did not receive Prevnar vaccine.  This was recommended but declined.  -  Kristyn L Samples did not receive COVID vaccine.  This was recommended but declined.    8. ACO / DANK/Other  Quality measures  -  Kristyn JONSE Samples did not receive 2024 flu vaccine.  This was recommended.  -  Kristyn JONES Samples reports a pain score of 0.   -  Current outpatient and discharge medications have been reconciled for the patient.  Reviewed by: Kaylee Tellez MD    9. Follow Up:  -  Proceed with [id number] today.  -  RTC to see APRN for toxicity check in 2 wks with treatment labs  -  Use Cryotherapy mittens/booties  -  F/u with me 4 wks with CBCD, CMP, TSH, VIt D.          I spent30 minutes with Kristyn Goff today.  In the office today, more than 50% of this time was spent face-to-face with her  in counseling / coordination of care, reviewing her interim medical history and counseling on the current treatment plan.  All questions were answered to her satisfaction.           Electronically Signed by: Kaylee Tellez MD        CC:     JOHN Toledo DO Veronica Morgan Jones, MD

## 2025-04-11 ENCOUNTER — OFFICE VISIT (OUTPATIENT)
Dept: ONCOLOGY | Facility: CLINIC | Age: 75
End: 2025-04-11
Payer: MEDICARE

## 2025-04-11 ENCOUNTER — INFUSION (OUTPATIENT)
Dept: ONCOLOGY | Facility: HOSPITAL | Age: 75
End: 2025-04-11
Payer: MEDICARE

## 2025-04-11 ENCOUNTER — LAB (OUTPATIENT)
Dept: ONCOLOGY | Facility: CLINIC | Age: 75
End: 2025-04-11
Payer: MEDICARE

## 2025-04-11 ENCOUNTER — LAB (OUTPATIENT)
Dept: ONCOLOGY | Facility: HOSPITAL | Age: 75
End: 2025-04-11
Payer: MEDICARE

## 2025-04-11 VITALS
HEART RATE: 115 BPM | BODY MASS INDEX: 33.04 KG/M2 | RESPIRATION RATE: 20 BRPM | OXYGEN SATURATION: 92 % | TEMPERATURE: 98 F | SYSTOLIC BLOOD PRESSURE: 131 MMHG | DIASTOLIC BLOOD PRESSURE: 68 MMHG | HEIGHT: 68 IN | WEIGHT: 218 LBS

## 2025-04-11 VITALS
OXYGEN SATURATION: 99 % | RESPIRATION RATE: 18 BRPM | HEART RATE: 91 BPM | DIASTOLIC BLOOD PRESSURE: 63 MMHG | SYSTOLIC BLOOD PRESSURE: 142 MMHG | WEIGHT: 218.6 LBS | BODY MASS INDEX: 33.24 KG/M2 | TEMPERATURE: 97.6 F

## 2025-04-11 DIAGNOSIS — Z17.0 MALIGNANT NEOPLASM OF OVERLAPPING SITES OF LEFT BREAST IN FEMALE, ESTROGEN RECEPTOR POSITIVE: ICD-10-CM

## 2025-04-11 DIAGNOSIS — Z17.0 MALIGNANT NEOPLASM OF OVERLAPPING SITES OF LEFT BREAST IN FEMALE, ESTROGEN RECEPTOR POSITIVE: Primary | ICD-10-CM

## 2025-04-11 DIAGNOSIS — D64.9 NORMOCYTIC ANEMIA: ICD-10-CM

## 2025-04-11 DIAGNOSIS — C50.812 MALIGNANT NEOPLASM OF OVERLAPPING SITES OF LEFT BREAST IN FEMALE, ESTROGEN RECEPTOR POSITIVE: Primary | ICD-10-CM

## 2025-04-11 DIAGNOSIS — R53.83 OTHER FATIGUE: ICD-10-CM

## 2025-04-11 DIAGNOSIS — C50.812 MALIGNANT NEOPLASM OF OVERLAPPING SITES OF LEFT BREAST IN FEMALE, ESTROGEN RECEPTOR POSITIVE: ICD-10-CM

## 2025-04-11 DIAGNOSIS — R53.1 GENERALIZED WEAKNESS: ICD-10-CM

## 2025-04-11 DIAGNOSIS — E55.9 VITAMIN D DEFICIENCY: ICD-10-CM

## 2025-04-11 LAB
ALBUMIN SERPL-MCNC: 3.9 G/DL (ref 3.5–5.2)
ALBUMIN/GLOB SERPL: 1.4 G/DL
ALP SERPL-CCNC: 112 U/L (ref 39–117)
ALT SERPL W P-5'-P-CCNC: 22 U/L (ref 1–33)
ANION GAP SERPL CALCULATED.3IONS-SCNC: 12.9 MMOL/L (ref 5–15)
AST SERPL-CCNC: 24 U/L (ref 1–32)
BASOPHILS # BLD AUTO: 0.03 10*3/MM3 (ref 0–0.2)
BASOPHILS NFR BLD AUTO: 0.6 % (ref 0–1.5)
BILIRUB SERPL-MCNC: 0.5 MG/DL (ref 0–1.2)
BUN SERPL-MCNC: 17 MG/DL (ref 8–23)
BUN/CREAT SERPL: 21.5 (ref 7–25)
CALCIUM SPEC-SCNC: 8.9 MG/DL (ref 8.6–10.5)
CHLORIDE SERPL-SCNC: 102 MMOL/L (ref 98–107)
CO2 SERPL-SCNC: 24.1 MMOL/L (ref 22–29)
CREAT SERPL-MCNC: 0.79 MG/DL (ref 0.57–1)
DEPRECATED RDW RBC AUTO: 67.8 FL (ref 37–54)
EGFRCR SERPLBLD CKD-EPI 2021: 77.6 ML/MIN/1.73
EOSINOPHIL # BLD AUTO: 0.11 10*3/MM3 (ref 0–0.4)
EOSINOPHIL NFR BLD AUTO: 2.3 % (ref 0.3–6.2)
ERYTHROCYTE [DISTWIDTH] IN BLOOD BY AUTOMATED COUNT: 18.9 % (ref 12.3–15.4)
GLOBULIN UR ELPH-MCNC: 2.7 GM/DL
GLUCOSE SERPL-MCNC: 117 MG/DL (ref 65–99)
HCT VFR BLD AUTO: 30.4 % (ref 34–46.6)
HGB BLD-MCNC: 9.8 G/DL (ref 12–15.9)
IMM GRANULOCYTES # BLD AUTO: 0.02 10*3/MM3 (ref 0–0.05)
IMM GRANULOCYTES NFR BLD AUTO: 0.4 % (ref 0–0.5)
LYMPHOCYTES # BLD AUTO: 0.63 10*3/MM3 (ref 0.7–3.1)
LYMPHOCYTES NFR BLD AUTO: 12.9 % (ref 19.6–45.3)
MCH RBC QN AUTO: 31.6 PG (ref 26.6–33)
MCHC RBC AUTO-ENTMCNC: 32.2 G/DL (ref 31.5–35.7)
MCV RBC AUTO: 98.1 FL (ref 79–97)
MONOCYTES # BLD AUTO: 0.28 10*3/MM3 (ref 0.1–0.9)
MONOCYTES NFR BLD AUTO: 5.7 % (ref 5–12)
NEUTROPHILS NFR BLD AUTO: 3.81 10*3/MM3 (ref 1.7–7)
NEUTROPHILS NFR BLD AUTO: 78.1 % (ref 42.7–76)
NRBC BLD AUTO-RTO: 0 /100 WBC (ref 0–0.2)
PLATELET # BLD AUTO: 232 10*3/MM3 (ref 140–450)
PMV BLD AUTO: 10.2 FL (ref 6–12)
POTASSIUM SERPL-SCNC: 3.4 MMOL/L (ref 3.5–5.2)
PROT SERPL-MCNC: 6.6 G/DL (ref 6–8.5)
RBC # BLD AUTO: 3.1 10*6/MM3 (ref 3.77–5.28)
SODIUM SERPL-SCNC: 139 MMOL/L (ref 136–145)
WBC NRBC COR # BLD AUTO: 4.88 10*3/MM3 (ref 3.4–10.8)

## 2025-04-11 PROCEDURE — 99214 OFFICE O/P EST MOD 30 MIN: CPT | Performed by: INTERNAL MEDICINE

## 2025-04-11 PROCEDURE — 25010000002 DIPHENHYDRAMINE PER 50 MG: Performed by: INTERNAL MEDICINE

## 2025-04-11 PROCEDURE — 96375 TX/PRO/DX INJ NEW DRUG ADDON: CPT

## 2025-04-11 PROCEDURE — 25010000002 FAMOTIDINE 10 MG/ML SOLUTION: Performed by: INTERNAL MEDICINE

## 2025-04-11 PROCEDURE — 25010000002 HEPARIN LOCK FLUSH PER 10 UNITS

## 2025-04-11 PROCEDURE — 25810000003 SODIUM CHLORIDE 0.9 % SOLUTION 250 ML FLEX CONT: Performed by: INTERNAL MEDICINE

## 2025-04-11 PROCEDURE — 25010000002 PACLITAXEL PER 1 MG: Performed by: INTERNAL MEDICINE

## 2025-04-11 PROCEDURE — 25810000003 SODIUM CHLORIDE 0.9 % SOLUTION: Performed by: INTERNAL MEDICINE

## 2025-04-11 PROCEDURE — 1126F AMNT PAIN NOTED NONE PRSNT: CPT | Performed by: INTERNAL MEDICINE

## 2025-04-11 PROCEDURE — 96413 CHEMO IV INFUSION 1 HR: CPT

## 2025-04-11 PROCEDURE — 85025 COMPLETE CBC W/AUTO DIFF WBC: CPT | Performed by: INTERNAL MEDICINE

## 2025-04-11 PROCEDURE — 80053 COMPREHEN METABOLIC PANEL: CPT | Performed by: INTERNAL MEDICINE

## 2025-04-11 PROCEDURE — 25010000002 DEXAMETHASONE SODIUM PHOSPHATE 20 MG/5ML SOLUTION: Performed by: INTERNAL MEDICINE

## 2025-04-11 RX ORDER — HYDROCORTISONE SODIUM SUCCINATE 100 MG/2ML
100 INJECTION INTRAMUSCULAR; INTRAVENOUS AS NEEDED
OUTPATIENT
Start: 2025-04-25

## 2025-04-11 RX ORDER — FAMOTIDINE 10 MG/ML
20 INJECTION, SOLUTION INTRAVENOUS ONCE
OUTPATIENT
Start: 2025-04-18

## 2025-04-11 RX ORDER — FAMOTIDINE 10 MG/ML
20 INJECTION, SOLUTION INTRAVENOUS AS NEEDED
OUTPATIENT
Start: 2025-04-25

## 2025-04-11 RX ORDER — SODIUM CHLORIDE 0.9 % (FLUSH) 0.9 %
10 SYRINGE (ML) INJECTION AS NEEDED
Status: DISCONTINUED | OUTPATIENT
Start: 2025-04-11 | End: 2025-04-11 | Stop reason: HOSPADM

## 2025-04-11 RX ORDER — SODIUM CHLORIDE 9 MG/ML
20 INJECTION, SOLUTION INTRAVENOUS ONCE
OUTPATIENT
Start: 2025-04-18

## 2025-04-11 RX ORDER — SODIUM CHLORIDE 0.9 % (FLUSH) 0.9 %
10 SYRINGE (ML) INJECTION AS NEEDED
OUTPATIENT
Start: 2025-04-11

## 2025-04-11 RX ORDER — FAMOTIDINE 10 MG/ML
20 INJECTION, SOLUTION INTRAVENOUS ONCE
OUTPATIENT
Start: 2025-04-25

## 2025-04-11 RX ORDER — DIPHENHYDRAMINE HYDROCHLORIDE 50 MG/ML
50 INJECTION, SOLUTION INTRAMUSCULAR; INTRAVENOUS AS NEEDED
OUTPATIENT
Start: 2025-05-02

## 2025-04-11 RX ORDER — SODIUM CHLORIDE 9 MG/ML
20 INJECTION, SOLUTION INTRAVENOUS ONCE
OUTPATIENT
Start: 2025-05-02

## 2025-04-11 RX ORDER — DIPHENHYDRAMINE HYDROCHLORIDE 50 MG/ML
50 INJECTION, SOLUTION INTRAMUSCULAR; INTRAVENOUS AS NEEDED
OUTPATIENT
Start: 2025-04-18

## 2025-04-11 RX ORDER — HEPARIN SODIUM (PORCINE) LOCK FLUSH IV SOLN 100 UNIT/ML 100 UNIT/ML
500 SOLUTION INTRAVENOUS AS NEEDED
Status: DISCONTINUED | OUTPATIENT
Start: 2025-04-11 | End: 2025-04-11 | Stop reason: HOSPADM

## 2025-04-11 RX ORDER — HYDROCORTISONE SODIUM SUCCINATE 100 MG/2ML
100 INJECTION INTRAMUSCULAR; INTRAVENOUS AS NEEDED
OUTPATIENT
Start: 2025-05-02

## 2025-04-11 RX ORDER — FAMOTIDINE 10 MG/ML
20 INJECTION, SOLUTION INTRAVENOUS ONCE
Status: COMPLETED | OUTPATIENT
Start: 2025-04-11 | End: 2025-04-11

## 2025-04-11 RX ORDER — HEPARIN SODIUM (PORCINE) LOCK FLUSH IV SOLN 100 UNIT/ML 100 UNIT/ML
500 SOLUTION INTRAVENOUS AS NEEDED
OUTPATIENT
Start: 2025-04-11

## 2025-04-11 RX ORDER — SODIUM CHLORIDE 9 MG/ML
20 INJECTION, SOLUTION INTRAVENOUS ONCE
Status: COMPLETED | OUTPATIENT
Start: 2025-04-11 | End: 2025-04-11

## 2025-04-11 RX ORDER — FAMOTIDINE 10 MG/ML
20 INJECTION, SOLUTION INTRAVENOUS AS NEEDED
OUTPATIENT
Start: 2025-05-02

## 2025-04-11 RX ORDER — FAMOTIDINE 10 MG/ML
20 INJECTION, SOLUTION INTRAVENOUS AS NEEDED
OUTPATIENT
Start: 2025-04-18

## 2025-04-11 RX ORDER — FAMOTIDINE 10 MG/ML
20 INJECTION, SOLUTION INTRAVENOUS ONCE
OUTPATIENT
Start: 2025-05-02

## 2025-04-11 RX ORDER — SODIUM CHLORIDE 9 MG/ML
20 INJECTION, SOLUTION INTRAVENOUS ONCE
OUTPATIENT
Start: 2025-04-25

## 2025-04-11 RX ORDER — DIPHENHYDRAMINE HYDROCHLORIDE 50 MG/ML
50 INJECTION, SOLUTION INTRAMUSCULAR; INTRAVENOUS AS NEEDED
OUTPATIENT
Start: 2025-04-25

## 2025-04-11 RX ORDER — HYDROCORTISONE SODIUM SUCCINATE 100 MG/2ML
100 INJECTION INTRAMUSCULAR; INTRAVENOUS AS NEEDED
OUTPATIENT
Start: 2025-04-18

## 2025-04-11 RX ADMIN — DEXAMETHASONE SODIUM PHOSPHATE 12 MG: 4 INJECTION, SOLUTION INTRA-ARTICULAR; INTRALESIONAL; INTRAMUSCULAR; INTRAVENOUS; SOFT TISSUE at 14:21

## 2025-04-11 RX ADMIN — PACLITAXEL 171 MG: 6 INJECTION, SOLUTION, CONCENTRATE INTRAVENOUS at 14:58

## 2025-04-11 RX ADMIN — FAMOTIDINE 20 MG: 10 INJECTION INTRAVENOUS at 14:05

## 2025-04-11 RX ADMIN — Medication 500 UNITS: at 16:12

## 2025-04-11 RX ADMIN — DIPHENHYDRAMINE HYDROCHLORIDE 25 MG: 50 INJECTION, SOLUTION INTRAMUSCULAR; INTRAVENOUS at 14:08

## 2025-04-11 RX ADMIN — SODIUM CHLORIDE 20 ML/HR: 9 INJECTION, SOLUTION INTRAVENOUS at 14:03

## 2025-04-18 ENCOUNTER — INFUSION (OUTPATIENT)
Dept: ONCOLOGY | Facility: HOSPITAL | Age: 75
End: 2025-04-18
Payer: MEDICARE

## 2025-04-18 ENCOUNTER — LAB (OUTPATIENT)
Dept: ONCOLOGY | Facility: HOSPITAL | Age: 75
End: 2025-04-18
Payer: MEDICARE

## 2025-04-18 VITALS
DIASTOLIC BLOOD PRESSURE: 64 MMHG | TEMPERATURE: 98.1 F | WEIGHT: 218 LBS | HEART RATE: 114 BPM | OXYGEN SATURATION: 99 % | RESPIRATION RATE: 20 BRPM | BODY MASS INDEX: 33.15 KG/M2 | SYSTOLIC BLOOD PRESSURE: 121 MMHG

## 2025-04-18 DIAGNOSIS — Z17.0 MALIGNANT NEOPLASM OF OVERLAPPING SITES OF LEFT BREAST IN FEMALE, ESTROGEN RECEPTOR POSITIVE: Primary | ICD-10-CM

## 2025-04-18 DIAGNOSIS — C50.812 MALIGNANT NEOPLASM OF OVERLAPPING SITES OF LEFT BREAST IN FEMALE, ESTROGEN RECEPTOR POSITIVE: ICD-10-CM

## 2025-04-18 DIAGNOSIS — Z17.0 MALIGNANT NEOPLASM OF OVERLAPPING SITES OF LEFT BREAST IN FEMALE, ESTROGEN RECEPTOR POSITIVE: ICD-10-CM

## 2025-04-18 DIAGNOSIS — C50.812 MALIGNANT NEOPLASM OF OVERLAPPING SITES OF LEFT BREAST IN FEMALE, ESTROGEN RECEPTOR POSITIVE: Primary | ICD-10-CM

## 2025-04-18 LAB
ALBUMIN SERPL-MCNC: 3.7 G/DL (ref 3.5–5.2)
ALBUMIN/GLOB SERPL: 1.3 G/DL
ALP SERPL-CCNC: 105 U/L (ref 39–117)
ALT SERPL W P-5'-P-CCNC: 19 U/L (ref 1–33)
ANION GAP SERPL CALCULATED.3IONS-SCNC: 13.5 MMOL/L (ref 5–15)
AST SERPL-CCNC: 21 U/L (ref 1–32)
BASOPHILS # BLD AUTO: 0.03 10*3/MM3 (ref 0–0.2)
BASOPHILS NFR BLD AUTO: 1 % (ref 0–1.5)
BILIRUB SERPL-MCNC: 0.8 MG/DL (ref 0–1.2)
BUN SERPL-MCNC: 12 MG/DL (ref 8–23)
BUN/CREAT SERPL: 15.4 (ref 7–25)
CALCIUM SPEC-SCNC: 8.9 MG/DL (ref 8.6–10.5)
CHLORIDE SERPL-SCNC: 99 MMOL/L (ref 98–107)
CO2 SERPL-SCNC: 25.5 MMOL/L (ref 22–29)
CREAT SERPL-MCNC: 0.78 MG/DL (ref 0.57–1)
DEPRECATED RDW RBC AUTO: 64 FL (ref 37–54)
EGFRCR SERPLBLD CKD-EPI 2021: 78.8 ML/MIN/1.73
EOSINOPHIL # BLD AUTO: 0.07 10*3/MM3 (ref 0–0.4)
EOSINOPHIL NFR BLD AUTO: 2.4 % (ref 0.3–6.2)
ERYTHROCYTE [DISTWIDTH] IN BLOOD BY AUTOMATED COUNT: 17.6 % (ref 12.3–15.4)
GLOBULIN UR ELPH-MCNC: 2.8 GM/DL
GLUCOSE SERPL-MCNC: 125 MG/DL (ref 65–99)
HCT VFR BLD AUTO: 28 % (ref 34–46.6)
HGB BLD-MCNC: 9 G/DL (ref 12–15.9)
IMM GRANULOCYTES # BLD AUTO: 0.02 10*3/MM3 (ref 0–0.05)
IMM GRANULOCYTES NFR BLD AUTO: 0.7 % (ref 0–0.5)
LYMPHOCYTES # BLD AUTO: 0.64 10*3/MM3 (ref 0.7–3.1)
LYMPHOCYTES NFR BLD AUTO: 22.3 % (ref 19.6–45.3)
MCH RBC QN AUTO: 31.9 PG (ref 26.6–33)
MCHC RBC AUTO-ENTMCNC: 32.1 G/DL (ref 31.5–35.7)
MCV RBC AUTO: 99.3 FL (ref 79–97)
MONOCYTES # BLD AUTO: 0.28 10*3/MM3 (ref 0.1–0.9)
MONOCYTES NFR BLD AUTO: 9.8 % (ref 5–12)
NEUTROPHILS NFR BLD AUTO: 1.83 10*3/MM3 (ref 1.7–7)
NEUTROPHILS NFR BLD AUTO: 63.8 % (ref 42.7–76)
NRBC BLD AUTO-RTO: 0.7 /100 WBC (ref 0–0.2)
PLATELET # BLD AUTO: 223 10*3/MM3 (ref 140–450)
PMV BLD AUTO: 10.3 FL (ref 6–12)
POTASSIUM SERPL-SCNC: 3.7 MMOL/L (ref 3.5–5.2)
PROT SERPL-MCNC: 6.5 G/DL (ref 6–8.5)
RBC # BLD AUTO: 2.82 10*6/MM3 (ref 3.77–5.28)
SODIUM SERPL-SCNC: 138 MMOL/L (ref 136–145)
WBC NRBC COR # BLD AUTO: 2.87 10*3/MM3 (ref 3.4–10.8)

## 2025-04-18 PROCEDURE — 96413 CHEMO IV INFUSION 1 HR: CPT

## 2025-04-18 PROCEDURE — 25810000003 SODIUM CHLORIDE 0.9 % SOLUTION 250 ML FLEX CONT: Performed by: INTERNAL MEDICINE

## 2025-04-18 PROCEDURE — 80053 COMPREHEN METABOLIC PANEL: CPT

## 2025-04-18 PROCEDURE — 25010000002 DEXAMETHASONE SODIUM PHOSPHATE 20 MG/5ML SOLUTION: Performed by: INTERNAL MEDICINE

## 2025-04-18 PROCEDURE — 25010000002 PACLITAXEL PER 1 MG: Performed by: INTERNAL MEDICINE

## 2025-04-18 PROCEDURE — 25010000002 HEPARIN LOCK FLUSH PER 10 UNITS

## 2025-04-18 PROCEDURE — 25010000002 DIPHENHYDRAMINE PER 50 MG: Performed by: INTERNAL MEDICINE

## 2025-04-18 PROCEDURE — 85025 COMPLETE CBC W/AUTO DIFF WBC: CPT

## 2025-04-18 PROCEDURE — 25810000003 SODIUM CHLORIDE 0.9 % SOLUTION: Performed by: INTERNAL MEDICINE

## 2025-04-18 PROCEDURE — 96375 TX/PRO/DX INJ NEW DRUG ADDON: CPT

## 2025-04-18 PROCEDURE — 25010000002 FAMOTIDINE 10 MG/ML SOLUTION: Performed by: INTERNAL MEDICINE

## 2025-04-18 RX ORDER — HEPARIN SODIUM (PORCINE) LOCK FLUSH IV SOLN 100 UNIT/ML 100 UNIT/ML
500 SOLUTION INTRAVENOUS AS NEEDED
Status: DISCONTINUED | OUTPATIENT
Start: 2025-04-18 | End: 2025-04-18 | Stop reason: HOSPADM

## 2025-04-18 RX ORDER — SODIUM CHLORIDE 0.9 % (FLUSH) 0.9 %
10 SYRINGE (ML) INJECTION AS NEEDED
Status: DISCONTINUED | OUTPATIENT
Start: 2025-04-18 | End: 2025-04-18 | Stop reason: HOSPADM

## 2025-04-18 RX ORDER — FAMOTIDINE 10 MG/ML
20 INJECTION, SOLUTION INTRAVENOUS ONCE
Status: COMPLETED | OUTPATIENT
Start: 2025-04-18 | End: 2025-04-18

## 2025-04-18 RX ORDER — SODIUM CHLORIDE 0.9 % (FLUSH) 0.9 %
10 SYRINGE (ML) INJECTION AS NEEDED
OUTPATIENT
Start: 2025-04-18

## 2025-04-18 RX ORDER — HEPARIN SODIUM (PORCINE) LOCK FLUSH IV SOLN 100 UNIT/ML 100 UNIT/ML
500 SOLUTION INTRAVENOUS AS NEEDED
OUTPATIENT
Start: 2025-04-18

## 2025-04-18 RX ORDER — SODIUM CHLORIDE 9 MG/ML
20 INJECTION, SOLUTION INTRAVENOUS ONCE
Status: COMPLETED | OUTPATIENT
Start: 2025-04-18 | End: 2025-04-18

## 2025-04-18 RX ADMIN — DIPHENHYDRAMINE HYDROCHLORIDE 25 MG: 50 INJECTION INTRAMUSCULAR; INTRAVENOUS at 13:58

## 2025-04-18 RX ADMIN — PACLITAXEL 171 MG: 6 INJECTION, SOLUTION INTRAVENOUS at 14:47

## 2025-04-18 RX ADMIN — Medication 10 ML: at 15:51

## 2025-04-18 RX ADMIN — SODIUM CHLORIDE 20 ML/HR: 9 INJECTION, SOLUTION INTRAVENOUS at 13:56

## 2025-04-18 RX ADMIN — DEXAMETHASONE SODIUM PHOSPHATE 12 MG: 4 INJECTION, SOLUTION INTRA-ARTICULAR; INTRALESIONAL; INTRAMUSCULAR; INTRAVENOUS; SOFT TISSUE at 14:15

## 2025-04-18 RX ADMIN — FAMOTIDINE 20 MG: 10 INJECTION, SOLUTION INTRAVENOUS at 13:56

## 2025-04-18 RX ADMIN — HEPARIN 500 UNITS: 100 SYRINGE at 15:51

## 2025-04-25 ENCOUNTER — OFFICE VISIT (OUTPATIENT)
Dept: ONCOLOGY | Facility: CLINIC | Age: 75
End: 2025-04-25
Payer: MEDICARE

## 2025-04-25 ENCOUNTER — APPOINTMENT (OUTPATIENT)
Dept: ONCOLOGY | Facility: HOSPITAL | Age: 75
End: 2025-04-25
Payer: MEDICARE

## 2025-04-25 ENCOUNTER — INFUSION (OUTPATIENT)
Dept: ONCOLOGY | Facility: HOSPITAL | Age: 75
End: 2025-04-25
Payer: MEDICARE

## 2025-04-25 VITALS
OXYGEN SATURATION: 99 % | HEART RATE: 123 BPM | SYSTOLIC BLOOD PRESSURE: 96 MMHG | DIASTOLIC BLOOD PRESSURE: 51 MMHG | TEMPERATURE: 98.9 F | RESPIRATION RATE: 20 BRPM

## 2025-04-25 VITALS
SYSTOLIC BLOOD PRESSURE: 118 MMHG | HEIGHT: 68 IN | DIASTOLIC BLOOD PRESSURE: 71 MMHG | HEART RATE: 83 BPM | WEIGHT: 213.2 LBS | RESPIRATION RATE: 20 BRPM | TEMPERATURE: 98 F | BODY MASS INDEX: 32.31 KG/M2

## 2025-04-25 DIAGNOSIS — R53.1 GENERALIZED WEAKNESS: ICD-10-CM

## 2025-04-25 DIAGNOSIS — R53.83 OTHER FATIGUE: ICD-10-CM

## 2025-04-25 DIAGNOSIS — R19.7 DIARRHEA, UNSPECIFIED TYPE: ICD-10-CM

## 2025-04-25 DIAGNOSIS — T45.1X5A CHEMOTHERAPY-INDUCED NAUSEA: ICD-10-CM

## 2025-04-25 DIAGNOSIS — R43.2 LOSS OF TASTE: ICD-10-CM

## 2025-04-25 DIAGNOSIS — D84.9 IMMUNODEFICIENCY, UNSPECIFIED: ICD-10-CM

## 2025-04-25 DIAGNOSIS — D64.9 NORMOCYTIC ANEMIA: ICD-10-CM

## 2025-04-25 DIAGNOSIS — C50.812 MALIGNANT NEOPLASM OF OVERLAPPING SITES OF LEFT BREAST IN FEMALE, ESTROGEN RECEPTOR POSITIVE: Primary | ICD-10-CM

## 2025-04-25 DIAGNOSIS — Z17.0 MALIGNANT NEOPLASM OF OVERLAPPING SITES OF LEFT BREAST IN FEMALE, ESTROGEN RECEPTOR POSITIVE: Primary | ICD-10-CM

## 2025-04-25 DIAGNOSIS — R63.0 POOR APPETITE: ICD-10-CM

## 2025-04-25 DIAGNOSIS — R11.0 CHEMOTHERAPY-INDUCED NAUSEA: ICD-10-CM

## 2025-04-25 LAB
ALBUMIN SERPL-MCNC: 3.7 G/DL (ref 3.5–5.2)
ALBUMIN/GLOB SERPL: 1.2 G/DL
ALP SERPL-CCNC: 109 U/L (ref 39–117)
ALT SERPL W P-5'-P-CCNC: 20 U/L (ref 1–33)
ANION GAP SERPL CALCULATED.3IONS-SCNC: 18.8 MMOL/L (ref 5–15)
ANISOCYTOSIS BLD QL: ABNORMAL
AST SERPL-CCNC: 20 U/L (ref 1–32)
BASOPHILS # BLD MANUAL: 0.08 10*3/MM3 (ref 0–0.2)
BASOPHILS NFR BLD MANUAL: 3 % (ref 0–1.5)
BILIRUB SERPL-MCNC: 0.5 MG/DL (ref 0–1.2)
BUN SERPL-MCNC: 21 MG/DL (ref 8–23)
BUN/CREAT SERPL: 14.6 (ref 7–25)
CALCIUM SPEC-SCNC: 9 MG/DL (ref 8.6–10.5)
CHLORIDE SERPL-SCNC: 96 MMOL/L (ref 98–107)
CO2 SERPL-SCNC: 20.2 MMOL/L (ref 22–29)
CREAT SERPL-MCNC: 1.44 MG/DL (ref 0.57–1)
DEPRECATED RDW RBC AUTO: 59.3 FL (ref 37–54)
EGFRCR SERPLBLD CKD-EPI 2021: 37.8 ML/MIN/1.73
ERYTHROCYTE [DISTWIDTH] IN BLOOD BY AUTOMATED COUNT: 16.2 % (ref 12.3–15.4)
GLOBULIN UR ELPH-MCNC: 3 GM/DL
GLUCOSE SERPL-MCNC: 154 MG/DL (ref 65–99)
HCT VFR BLD AUTO: 28.2 % (ref 34–46.6)
HGB BLD-MCNC: 9 G/DL (ref 12–15.9)
HYPOCHROMIA BLD QL: ABNORMAL
LARGE PLATELETS: ABNORMAL
LYMPHOCYTES # BLD MANUAL: 0.61 10*3/MM3 (ref 0.7–3.1)
LYMPHOCYTES NFR BLD MANUAL: 7 % (ref 5–12)
MACROCYTES BLD QL SMEAR: ABNORMAL
MAGNESIUM SERPL-MCNC: 1.6 MG/DL (ref 1.6–2.4)
MCH RBC QN AUTO: 32 PG (ref 26.6–33)
MCHC RBC AUTO-ENTMCNC: 31.9 G/DL (ref 31.5–35.7)
MCV RBC AUTO: 100.4 FL (ref 79–97)
METAMYELOCYTES NFR BLD MANUAL: 1 % (ref 0–0)
MONOCYTES # BLD: 0.18 10*3/MM3 (ref 0.1–0.9)
NEUTROPHILS # BLD AUTO: 1.64 10*3/MM3 (ref 1.7–7)
NEUTROPHILS NFR BLD MANUAL: 65 % (ref 42.7–76)
NRBC SPEC MANUAL: 4 /100 WBC (ref 0–0.2)
PLATELET # BLD AUTO: 244 10*3/MM3 (ref 140–450)
PMV BLD AUTO: 10.7 FL (ref 6–12)
POLYCHROMASIA BLD QL SMEAR: ABNORMAL
POTASSIUM SERPL-SCNC: 3.3 MMOL/L (ref 3.5–5.2)
PROT SERPL-MCNC: 6.7 G/DL (ref 6–8.5)
RBC # BLD AUTO: 2.81 10*6/MM3 (ref 3.77–5.28)
SCAN SLIDE: NORMAL
SODIUM SERPL-SCNC: 135 MMOL/L (ref 136–145)
VARIANT LYMPHS NFR BLD MANUAL: 24 % (ref 19.6–45.3)
WBC NRBC COR # BLD AUTO: 2.53 10*3/MM3 (ref 3.4–10.8)

## 2025-04-25 PROCEDURE — 96413 CHEMO IV INFUSION 1 HR: CPT

## 2025-04-25 PROCEDURE — 96375 TX/PRO/DX INJ NEW DRUG ADDON: CPT

## 2025-04-25 PROCEDURE — 25810000003 SODIUM CHLORIDE 0.9 % SOLUTION 250 ML FLEX CONT: Performed by: INTERNAL MEDICINE

## 2025-04-25 PROCEDURE — 85007 BL SMEAR W/DIFF WBC COUNT: CPT

## 2025-04-25 PROCEDURE — 25010000002 DEXAMETHASONE SODIUM PHOSPHATE 20 MG/5ML SOLUTION: Performed by: INTERNAL MEDICINE

## 2025-04-25 PROCEDURE — 63710000001 POTASSIUM CHLORIDE 20 MEQ TABLET CONTROLLED-RELEASE: Performed by: NURSE PRACTITIONER

## 2025-04-25 PROCEDURE — 85025 COMPLETE CBC W/AUTO DIFF WBC: CPT

## 2025-04-25 PROCEDURE — 25810000003 SODIUM CHLORIDE 0.9 % SOLUTION: Performed by: INTERNAL MEDICINE

## 2025-04-25 PROCEDURE — 25010000002 HEPARIN LOCK FLUSH PER 10 UNITS

## 2025-04-25 PROCEDURE — 25010000002 PACLITAXEL PER 1 MG: Performed by: INTERNAL MEDICINE

## 2025-04-25 PROCEDURE — 25010000002 FAMOTIDINE 10 MG/ML SOLUTION: Performed by: INTERNAL MEDICINE

## 2025-04-25 PROCEDURE — 83735 ASSAY OF MAGNESIUM: CPT

## 2025-04-25 PROCEDURE — 96361 HYDRATE IV INFUSION ADD-ON: CPT

## 2025-04-25 PROCEDURE — 80053 COMPREHEN METABOLIC PANEL: CPT

## 2025-04-25 PROCEDURE — 25010000002 DIPHENHYDRAMINE PER 50 MG: Performed by: INTERNAL MEDICINE

## 2025-04-25 PROCEDURE — A9270 NON-COVERED ITEM OR SERVICE: HCPCS | Performed by: NURSE PRACTITIONER

## 2025-04-25 PROCEDURE — 25810000003 SODIUM CHLORIDE 0.9 % SOLUTION

## 2025-04-25 RX ORDER — SODIUM CHLORIDE 0.9 % (FLUSH) 0.9 %
10 SYRINGE (ML) INJECTION AS NEEDED
Status: DISCONTINUED | OUTPATIENT
Start: 2025-04-25 | End: 2025-04-25 | Stop reason: HOSPADM

## 2025-04-25 RX ORDER — POTASSIUM CHLORIDE 1500 MG/1
40 TABLET, EXTENDED RELEASE ORAL EVERY 4 HOURS
Qty: 4 TABLET | Refills: 0 | Status: SHIPPED | OUTPATIENT
Start: 2025-04-25 | End: 2025-04-25 | Stop reason: SDUPTHER

## 2025-04-25 RX ORDER — HEPARIN SODIUM (PORCINE) LOCK FLUSH IV SOLN 100 UNIT/ML 100 UNIT/ML
500 SOLUTION INTRAVENOUS AS NEEDED
OUTPATIENT
Start: 2025-04-25

## 2025-04-25 RX ORDER — POTASSIUM CHLORIDE 1500 MG/1
40 TABLET, EXTENDED RELEASE ORAL ONCE
Status: COMPLETED | OUTPATIENT
Start: 2025-04-25 | End: 2025-04-25

## 2025-04-25 RX ORDER — SODIUM CHLORIDE 9 MG/ML
20 INJECTION, SOLUTION INTRAVENOUS ONCE
Status: COMPLETED | OUTPATIENT
Start: 2025-04-25 | End: 2025-04-25

## 2025-04-25 RX ORDER — FAMOTIDINE 10 MG/ML
20 INJECTION, SOLUTION INTRAVENOUS ONCE
Status: COMPLETED | OUTPATIENT
Start: 2025-04-25 | End: 2025-04-25

## 2025-04-25 RX ORDER — SODIUM CHLORIDE 0.9 % (FLUSH) 0.9 %
10 SYRINGE (ML) INJECTION AS NEEDED
OUTPATIENT
Start: 2025-04-25

## 2025-04-25 RX ORDER — HEPARIN SODIUM (PORCINE) LOCK FLUSH IV SOLN 100 UNIT/ML 100 UNIT/ML
500 SOLUTION INTRAVENOUS AS NEEDED
Status: DISCONTINUED | OUTPATIENT
Start: 2025-04-25 | End: 2025-04-25 | Stop reason: HOSPADM

## 2025-04-25 RX ORDER — DIPHENOXYLATE HYDROCHLORIDE AND ATROPINE SULFATE 2.5; .025 MG/1; MG/1
1 TABLET ORAL 4 TIMES DAILY PRN
Qty: 120 TABLET | Refills: 0 | Status: SHIPPED | OUTPATIENT
Start: 2025-04-25

## 2025-04-25 RX ORDER — ATORVASTATIN CALCIUM 10 MG/1
20 TABLET, FILM COATED ORAL DAILY
COMMUNITY

## 2025-04-25 RX ADMIN — SODIUM CHLORIDE 1000 ML: 9 INJECTION, SOLUTION INTRAVENOUS at 12:01

## 2025-04-25 RX ADMIN — FAMOTIDINE 20 MG: 10 INJECTION INTRAVENOUS at 13:09

## 2025-04-25 RX ADMIN — Medication 10 ML: at 14:59

## 2025-04-25 RX ADMIN — PACLITAXEL 165 MG: 6 INJECTION, SOLUTION, CONCENTRATE INTRAVENOUS at 13:54

## 2025-04-25 RX ADMIN — POTASSIUM CHLORIDE 40 MEQ: 1500 TABLET, EXTENDED RELEASE ORAL at 14:29

## 2025-04-25 RX ADMIN — DEXAMETHASONE SODIUM PHOSPHATE 12 MG: 4 INJECTION, SOLUTION INTRA-ARTICULAR; INTRALESIONAL; INTRAMUSCULAR; INTRAVENOUS; SOFT TISSUE at 13:22

## 2025-04-25 RX ADMIN — HEPARIN 500 UNITS: 100 SYRINGE at 14:59

## 2025-04-25 RX ADMIN — SODIUM CHLORIDE 20 ML/HR: 9 INJECTION, SOLUTION INTRAVENOUS at 13:11

## 2025-04-25 RX ADMIN — DIPHENHYDRAMINE HYDROCHLORIDE 25 MG: 50 INJECTION INTRAMUSCULAR; INTRAVENOUS at 13:11

## 2025-04-25 NOTE — PROGRESS NOTES
"NAME: Kristyn Goff    : 1950    DATE: 2025    DIAGNOSIS: Malignant neoplasm of upper-outer quadrant of left breast in female, estrogen receptor positive     TREATMENT HISTORY:   Left mastectomy and SLNBx  with Prophylactic Contralateral Mastectomy performed by Dr. Winnie He 24    2.      3.            CHIEF COMPLAINT:  Follow Up Breast Cancer/Toxicity Check    HISTORY OF PRESENT ILLNESS:   Kristyn Goff is a very pleasant 75 y.o. female who is being seen today in the presence of her son and daughter-in-law at the request of Maria Esther He MD for evaluation and treatment of breast cancer.  Ms. Goff says she first noticed a \"knot\" in her left breast about 15 years prior around .  Then it was maybe the size of a marble.  She says it enlarged over time until it was about the size of a lime.  She was seeing her PEP, JOHN Toledo who noticed the lump during an exam and ordered further evaluation.  She had biopsy of two areas in the L breast as well as L axillary LN and this showed invasive moderately differentiated ductal carcinoma with micropapillary features Tumor was >95% 3+ ER+, 81-90% 2+ RI+, HER-2/Estelita - (0).  L axillary LN was involved.  Dr. He took her for L mastectomy w/ SLNBx and prophylactic contralateral mastectomy as below on 24.  Patholgy showed a Stage IIIA multifocal invasive ductal carcinoma of the left breast with micropapillary features. Tumors were 30 mm, 4 mm.  There was associated high grade DCIS.  4/5 LN were involved (3/4 SLN and 1/1 other LN), the largest being 22 mm with extranodal extension.  R breast was without malignancy.    Ms. Goff is recovering reasonably well.  She has healed somewhat slowly and still has L axillary drain in place but she is making steady progress and now says she has minimal output from the axillary drain.  She is to see Dr. He on Wednesday.   She denies weight loss.  No fevers or chills. No chest pain or " "shortness of breath. No abdominal pain, n/v/d/c, no rectal bleeding.  No bony pain. No headaches or visual disturbance.  They do complain that she has developed a \"place\" on her back they are concerned about that they want me to look at today.    Of note, prior to her surgery she had CT imaging as below which showed several small, nonspecific lung nodules in the R lung (5-6 mm).  Of note similar finding was noted on CTPE protocol from 23).  MRI of the brain also showed a 3 mm enhancing lesion in the L frontal bone which could represent hemangioma although metastatic lesion could not be completely excluded.  Bone scan was clear.        INTERVAL HISTORY:  Ms. Goff presents today for follow up of breast cancer and toxicity check.  She reports doing ok since her last visit with us.  However, she states that her diarrhea has returned and happens almost every time that she eats or drinks anything.  Therefore, she has been avoiding eating and drinking, plus she has been out of boost for the past week.  She also reports today that her taste is gone again contributing to her poor appetite.  She states today that she is fatigued/weak, and will get lightheaded if she gets up too quickly or changes positions quickly.  She also gets short of breath with any exertion.  Denies any fevers or chills.  She is down about 5 pounds since last week.  No other concerning B symptoms today.  She is otherwise without any other new or specific complaints.      PAST MEDICAL HISTORY:  Past Medical History:   Diagnosis Date    Breast cancer     Elevated cholesterol     GERD (gastroesophageal reflux disease)     High cholesterol    -  H/o Diverticulosis  -  H/o Hyperparathyroidism    Risk Factors:  Age of Menarche: 9 yo  Age of Menopause: around age 54  Prior Breast Disease: Denies prior bx but says this lesion was present for maybe 15 yrs before she sought attention.  Pregnancies:    Age of 1st pregnancy:23  Family History of Breast " Cancer: denies  Family History of Ovarian Cancer:Denies  History of HRT use:  Denies       PAST SURGICAL HISTORY:  Past Surgical History:   Procedure Laterality Date    CATARACT EXTRACTION Right     COLONOSCOPY      GALLBLADDER SURGERY      MASTECTOMY Bilateral     PARATHYROIDECTOMY      VENOUS ACCESS DEVICE (PORT) INSERTION Right 1/13/2025    Procedure: INSERTION VENOUS ACCESS DEVICE;  Surgeon: Karen Johnston MD;  Location: [facility];  Service: General;  Laterality: Right;   -  Parathyrodectomy  -  S/p mario cataract surgery w/ implant placement  -  CCU    FAMILY HISTORY:  Family History   Problem Relation Age of Onset    Heart attack Father     Hypertension Sister     Heart attack Sister     Diabetes Sister    -  Denies family h/o malignancy of any kind    SOCIAL HISTORY:  Social History     Socioeconomic History    Marital status:    Tobacco Use    Smoking status: Former     Types: Cigarettes    Smokeless tobacco: Never   Vaping Use    Vaping status: Never Used   Substance and Sexual Activity    Alcohol use: Never    Drug use: Never    Sexual activity: Defer   -  .  Lives alone.  Used to own a flower shop but retired around 2012.  Former smoker, quit around age 33 yo      REVIEW OF SYSTEMS:   A comprehensive 14 point review of systems was performed.  Significant findings as mentioned above.  All other systems reviewed and are negative.      MEDICATIONS:  The current medication list was reviewed in the EMR    Current Outpatient Medications:     atorvastatin (LIPITOR) 10 MG tablet, Take 2 tablets by mouth Daily., Disp: , Rfl:     Carboxymethylcellulose Sodium (EYE DROPS OP), Apply 1 drop to eye(s) as directed by provider 2 (Two) Times a Day., Disp: , Rfl:     lidocaine-prilocaine (EMLA) 2.5-2.5 % cream, Apply to port-a-cath site 30 minutes prior to arrival at infusion center. Cover with plastic wrap., Disp: 30 g, Rfl: 1    loratadine (CLARITIN) 10 MG tablet, Take 1 tablet by mouth daily on the day  "following chemotherapy prior to growth factor injection and continue for 7 days., Disp: 7 tablet, Rfl: 5    ondansetron (ZOFRAN) 8 MG tablet, Take 1 tablet by mouth 3 (Three) Times a Day As Needed for Nausea or Vomiting., Disp: 30 tablet, Rfl: 5    prochlorperazine (COMPAZINE) 10 MG tablet, Take 1 tablet by mouth Every 6 (Six) Hours As Needed for Nausea or Vomiting., Disp: 30 tablet, Rfl: 1    vitamin D (ERGOCALCIFEROL) 1.25 MG (96795 UT) capsule capsule, Take 1 capsule by mouth 1 (One) Time Per Week., Disp: 4 capsule, Rfl: 5    diphenoxylate-atropine (LOMOTIL) 2.5-0.025 MG per tablet, Take 1 tablet by mouth 4 (Four) Times a Day As Needed for Diarrhea., Disp: 120 tablet, Rfl: 0    OLANZapine (zyPREXA) 5 MG tablet, Take 1 tablet by mouth for 4 doses starting night of chemotherapy., Disp: 8 tablet, Rfl: 1  No current facility-administered medications for this visit.    Facility-Administered Medications Ordered in Other Visits:     dexAMETHasone (DECADRON) IVPB 12 mg, 12 mg, Intravenous, Once, Kaylee Tellez MD, Last Rate: 200 mL/hr at 04/25/25 1322, 12 mg at 04/25/25 1322    PACLitaxel (TAXOL) 165 mg in sodium chloride 0.9 % 277.5 mL chemo IVPB, 165 mg, Intravenous, Once, Kaylee Tellez MD    potassium chloride (KLOR-CON M20) CR tablet 40 mEq, 40 mEq, Oral, Once, Samara Godinez APRN    ALLERGIES:  No Known Allergies    PHYSICAL EXAM:  Vitals:    04/25/25 1029   BP: 118/71   Pulse: 83   Resp: 20   Temp: 98 °F (36.7 °C)   TempSrc: Temporal   Weight: 96.7 kg (213 lb 3.2 oz)   Height: 172.7 cm (68\")   PainSc: 0-No pain         Body surface area is 2.1 meters squared.   Body mass index is 32.42 kg/m².   ECOG score: 0     General:  Awake, alert and oriented, in no distress.  In good spirits.  HEENT:  Pupils are equal, round and reactive to light and accommodation, Extra-ocular movements full, Oropharyx clear, mucous membranes moist  Neck:  No JVD, thyromegaly or lymphadenopathy  CV:  Regular rate and rhythm, " no murmurs, rubs or gallops  Resp:  Lungs are clear to auscultation bilaterally, no wheezing.  PAC in R chest c/d/i  Breast:  S/p mario mastectomies.  Surgical incisions are smooth and intact without nodularity. No axillary LAD on either side.   Abd:  Soft, non-tender, non-distended, bowel sounds present, no palpable organomegaly or masses  Ext:  No clubbing, cyanosis, no lower extremity edema  Back:  Over the lower L back near the waistline, she previously had an area of cellulitis maybe 8x2.5 cm with induration / thickening and erythema of the skin.  There was some element of abscess formation with purulent drainage.  This is now completely resolved without erythema warmth or fluctuance.  Lymph:  No cervical, supraclavicular, axillary, inguinal or femoral adenopathy  Neuro:  MS as above, CN II-XII intact, grossly non-focal exam      PATHOLOGY:  8/28/24 11/4/24            ENDOSCOPY:        IMAGING:  Mammo Diagnostic Digital Tomosynthesis Bilateral With CAD (07/19/2024 10:42) & US Breast Left Limited (07/19/2024 11:35)   FINDINGS:    Irregular hypodense mass upper outer left breast, anterior depth, 1-2 o'clock radian, with pleomorphic calcifications, measures 3.9 x 2.4 cm (image 40 MLO, image 37 CC).      Irregular lesion with coarse calcification is seen at left 2-3 o'clock radian, posterior depth measuring 1.7 x 1.5 cm (image 26 MLO, image 23 CC).      Enlarged left axillary lymph nodes are seen.      Benign secretory calcifications are seen bilaterally.      No additional suspicious masses, tissue distortion, or suspicious calcifications are identified.      Ultrasound:      Real time, targeted, technologist performed sonographic imaging of the left breast was performed utilizing a multihertz transducer.       Ultrasound was performed in the left upper outer quadrant and axilla.      --- Lobulated mass is seen at left 11-2 o'clock radian, 4 cm from the nipple, central vascular flow, 5.0 x 2.9 x 3.4 cm.  This corresponds to the mammogram.    --- Spiculated lesion at 2-3 o'clock radian, 9 cm from the nipple, measuring 1.7 x 1.0 x 1.1 cm. This corresponds to the distortion seen on mammogram.      Enlarging nodes are seen in the left axilla. The largest node measures 2.0 x 2.5 x 1.3 cm.     IMPRESSION:  1.   No mammographic evidence of malignancy is are seen in the right breast.        2.   Breast masses at left 11-3 o'clock radian. These are highly suspicious for malignant process.    3. Enlarging nodes in the left axilla which are suspicious for metastatic disease.         RECOMMENDED FOLLOWUP: Ultrasound guided biopsy of the large mass at 11-2 o'clock radian, left 2-3 o'clock radian and one of the left axillary lymph nodes.      BI-RADS category 5: Highly suggestive of malignancy.       MRI Abdomen With & Without Contrast (08/14/2024 14:05)   FINDINGS:      LOWER CHEST: Lung bases are clear. Small hiatal hernia.      LIVER: Normal contours. No mass. Vascular shunt in the dome (for example on bolus phase series, image 119). No steatosis. No intrahepatic biliary dilatation. Portal and hepatic veins are patent.      GALLBLADDER: Removed.      COMMON BILE DUCT: Normal caliber. No stones.       SPLEEN: Within normal limits.      PANCREAS: No mass. No pancreatic fluid collections. The pancreatic duct is normal.      ADRENALS: No masses.      KIDNEYS: No solid left renal lesion identified, to correspond to the abnormality described on recent ultrasound. A parapelvic cyst in the left kidney measuring 2 cm. A couple of small cortical cysts in the right kidney measuring up to 1.3 cm. No hydronephrosis.      LYMPH NODES: No adenopathy.      STOMACH, SMALL BOWEL AND COLON: No bowel wall thickening or obstruction.      PERITONEAL CAVITY: No mesenteric stranding or free fluid.      ABDOMINAL AORTA: No aneurysm.      SOFT TISSUES: Normal.      OSSEOUS STRUCTURES: No acute fracture or destructive lesion.     IMPRESSION:  1.   No  solid left renal lesion identified, to correspond to the abnormality described on recent ultrasound. Few bilateral renal cysts, including a parapelvic cyst in the left kidney measuring 2 cm.   2.   Additional noncontributory findings described above.     Mammo Diagnostic Digital Tomosynthesis Left With CAD (08/28/2024 09:46) & US Breast Left Limited (08/28/2024 11:11)   FINDINGS:   Findings: There are multiple suspicious findings in the left breast as listed below:     Finding 1: There is a irregular mass with associated calcifications abutting the skin, measuring mammographically 45 x 42 x 30 mm. Targeted left breast ultrasound at the 1:00 position, 4 cm from the revealed a corresponding sonographic irregular mass with calcifications measuring sonographically 4.6 x 2.7 x 3 cm. This extends to the skin. Finding is hard Elastography. Finding is highly suspicious.     Finding 2: There is a irregular mass is associated architectural distortion and coarse central calcification as well as fine: calcifications in the approximate 2:00 position of the left breast middle to posterior depth, 13 cm from the nipple. Targeted left breast ultrasound the 2:00 position 13 cm from the reveals an irregular 8 x 6 x 6 mm mass, with adjacent vascularity, intermediate Elastography.   Finding is highly suspicious.     Finding 3: In the 12:00 position of the left breast there is a 45 mm span of fine pleomorphic calcifications with linear/segmental distribution with associated subtle asymmetry best appreciated in the magnified views, full field CC, slice 40 of 79 as well as ML slice 47/91. These are located approximately 8 cm from nipple. Targeted right breast ultrasound in the left breast at the 12:00 position 8 cm from the nipple identifies an irregular mass, soft on Elastography, measuring 14 x 11 x 12 mm, which appears underestimated sonographically compared to mammographic span of 45 mm linear calcifications. Finding is highly  suspicious     Finding 4: There are additional small highly suspicious asymmetries with microcalcifications scattered throughout the inferior left breast and lower inner quadrant in the approximate 6:00-9:00 position middle to posterior depth: The total span of these lower inner quadrant asymmetries measures up to 10 x 7 x 6 cm as best appreciated on CC full-field, slice 13 of 79 and ML slice 27 out of 92. Findings are highly suspicious indicating multicentric disease.     Finding 5: There are  2 abnormal lymph nodes in the left axilla corresponding to completely replaced lymph nodes labeled as lymph node1 and 2 on US; these are highly suspicious with no demonstration of fatty hilum. This lymph nodes measure respectively 22 mm and 18 mm each, adjacent to each other.       Right breast: A repeat right diagnostic mammogram was not performed as review of outside right breast mammogram did not reveal any suspicious areas of concern.     IMPRESSION:  BI-RADS Code: BI-RADS 5, Highly suggestive of malignancy.   Multicentric left breast disease with highly suspicious at least 2 left axillary lymph nodes     RECOMMENDATIONS:   Left Breast Recommendations: Ultrasound Guided Breast Biopsy for findings 1 and 2 to confirm multicentric disease.     Fine Needle Aspiration     Right diagnostic mammogram in one year.     US Axilla Left (09/12/2024 12:02)   FINDINGS:   Final mammographic images demonstrate the localizer tag to be located within the left axillary lymph node and adjacent to the twirl clip.     Left Breast Density: The breast tissue is heterogeneously dense, which may obscure small masses.     IMPRESSION:  Successful localization of the left axillary lymph node with a pink smartclip.     MR Head w and wo IV Contrast (09/25/2024 08:32)   Findings:     No enhancing lesions to suggest intracranial metastatic disease.     No evidence of restricted diffusion to suggest acute territorial infarct.     No evidence of mass  effect, midline shift, ration, hydrocephalus.     No acute appearing intracranial hemorrhage.  No extra-axial fluid collections.  Scattered periventricular and subcortical T2/FLAIR hyperintense foci within the supratentorial brain, probably related to small vessel ischemic changes.  Age-appropriate ventricular size and configuration.     Basal cisterns are patent.  Major intracranial flow voids are preserved.     Paranasal sinuses  are clear.  Mastoid cells are clear.  Postsurgical changes of the orbits.     3 mm enhancing lesion within the left frontal bone could be related to hemangioma (series 6 image 20, series 13 image 20), although underlying metastatic disease cannot be totally excluded.  Attention to this area on follow-up imaging.     No additional calvarial lesions.     Impression:  No abnormal intraparenchymal postcontrast enhancement to suggest intraparenchymal metastatic disease.      No acute intracranial process.  Sequelae of small vessel ischemic changes.     3 mm enhancing lesion within the left frontal bone could be related to hemangioma (series 6 image 20, series 13 image 20), although underlying metastatic disease cannot be totally excluded.  Attention to this area on follow-up imaging.     CT Abdomen Pelvis With Contrast (09/25/2024 12:40)   FINDINGS:   Mediastinum and Pleura: No mediastinal or hilar adenopathy. No pleural or pericardial effusion.     Lungs: 5 mm peripheral right upper lobe nodule, image 49 of series 3. Subpleural 6 mm peripheral right middle lobe nodule, image 58 series 3. Subpleural 6 mm right basal nodule, image 73 of series 3. No acute airspace disease. The central airways are unremarkable.     Abdomen and Pelvis: No focal hepatic lesions. Gallbladder is removed. No biliary dilatation. No splenic lesions. Normal adrenal glands. No pancreatic lesions or peripancreatic stranding. Exophytic right renal cortical cysts. No suspicious renal parenchymal lesions. No renal collecting  system dilatation.     Calcifications involving abdominal aorta, bilateral iliac vessels and SMA, with associated moderate to significant stenosis of the distal SMA trunk.     No enlarged lymph nodes. No mesenteric or retroperitoneal nodularity. No free fluid or air within the abdomen or pelvis.     Unremarkable stomach. Normal caliber of small and large bowel. Distal colonic diverticulosis without evidence of complications.     Urinary bladder is within normal limits. No abnormal soft tissue densities in the regions of adnexa.     Musculoskeletal: Right breast is only partially included, without discrete suspicious lesions noted. There is a biopsied lesion within left breast, image 34 of series 2, measures up to 2.1 cm. There is also a left axillary lymphadenopathy, status post biopsy, with example of left axillary lymph node on image 37 of series 2 measuring up to 1.7 cm.     No aggressive osseous lesions or acute fractures.     IMPRESSION:  Left breast lesion and left axillary lymphadenopathy, representing known breast cancer with metastasis.   Several peripheral right lung nodules, nonspecific.   No enlarged mediastinal lymph nodes.   No suspicious lesions within the abdomen or pelvis.   No suspicious osseous lesions are identified.     CT Chest With Contrast Diagnostic (09/25/2024 12:40)   FINDINGS:   Mediastinum and Pleura: No mediastinal or hilar adenopathy. No pleural or pericardial effusion.     Lungs: 5 mm peripheral right upper lobe nodule, image 49 of series 3. Subpleural 6 mm peripheral right middle lobe nodule, image 58 series 3. Subpleural 6 mm right basal nodule, image 73 of series 3. No acute airspace disease. The central airways are unremarkable.     Abdomen and Pelvis: No focal hepatic lesions. Gallbladder is removed. No biliary dilatation. No splenic lesions. Normal adrenal glands. No pancreatic lesions or peripancreatic stranding. Exophytic right renal cortical cysts. No suspicious renal  parenchymal lesions. No renal collecting system dilatation.     Calcifications involving abdominal aorta, bilateral iliac vessels and SMA, with associated moderate to significant stenosis of the distal SMA trunk.     No enlarged lymph nodes. No mesenteric or retroperitoneal nodularity. No free fluid or air within the abdomen or pelvis.     Unremarkable stomach. Normal caliber of small and large bowel. Distal colonic diverticulosis without evidence of complications.     Urinary bladder is within normal limits. No abnormal soft tissue densities in the regions of adnexa.     Musculoskeletal: Right breast is only partially included, without discrete suspicious lesions noted. There is a biopsied lesion within left breast, image 34 of series 2, measures up to 2.1 cm. There is also a left axillary lymphadenopathy, status post biopsy, with example of left axillary lymph node on image 37 of series 2 measuring up to 1.7 cm.     No aggressive osseous lesions or acute fractures.     IMPRESSION:  Left breast lesion and left axillary lymphadenopathy, representing known breast cancer with metastasis.   Several peripheral right lung nodules, nonspecific.   No enlarged mediastinal lymph nodes.   No suspicious lesions within the abdomen or pelvis.   No suspicious osseous lesions are identified.     NM Bone Scan Whole Body (09/25/2024 13:41)   FINDINGS:   Bones: No sites of focal increased (hot) and decreased (cold) uptake in axial and appendicular skeleton to suggest bone metastasis.     Bones/Joints: Sites of mild uptake consistent with benign arthritic, degenerative or post traumatic changes.     Soft-tissues: Asymmetric mild uptake in the left breast soft tissues relative to the right. Two kidneys visualized.     IMPRESSION:  No evidence of osseous metastatic disease.     Adult Transthoracic Echo Complete W/ Cont if Necessary Per Protocol (01/03/2025 09:52)   Interpretation Summary    Left ventricular systolic function is normal.  Estimated left ventricular EF = 55%    Left ventricular wall thickness is consistent with moderate concentric hypertrophy.    Left ventricular diastolic function is consistent with (grade I) impaired relaxation.    Estimated right ventricular systolic pressure from tricuspid regurgitation is normal (<35 mmHg).    RECENT LABS:  Lab Results   Component Value Date    WBC 2.53 (L) 04/25/2025    HGB 9.0 (L) 04/25/2025    HCT 28.2 (L) 04/25/2025    .4 (H) 04/25/2025    RDW 16.2 (H) 04/25/2025     04/25/2025    NEUTRORELPCT 63.8 04/18/2025    LYMPHORELPCT 22.3 04/18/2025    MONORELPCT 9.8 04/18/2025    EOSRELPCT 2.4 04/18/2025    BASORELPCT 1.0 04/18/2025    NEUTROABS 1.64 (L) 04/25/2025    LYMPHSABS 0.64 (L) 04/18/2025       Lab Results   Component Value Date     (L) 04/25/2025    K 3.3 (L) 04/25/2025    CO2 20.2 (L) 04/25/2025    CL 96 (L) 04/25/2025    BUN 21 04/25/2025    CREATININE 1.44 (H) 04/25/2025    GLUCOSE 154 (H) 04/25/2025    CALCIUM 9.0 04/25/2025    ALKPHOS 109 04/25/2025    AST 20 04/25/2025    ALT 20 04/25/2025    BILITOT 0.5 04/25/2025    ALBUMIN 3.7 04/25/2025    PROTEINTOT 6.7 04/25/2025    MG 1.6 04/25/2025     Lab Results   Component Value Date    FERRITIN 384.20 (H) 02/28/2025    IRON 92 02/28/2025    TIBC 352 02/28/2025    LABIRON 26 02/28/2025    BYLBIKNO90 335 11/25/2024    FOLATE 7.32 11/25/2024     Lab Results   Component Value Date    TSH 2.790 11/25/2024     Lab Results   Component Value Date    ISUA13HR 26.3 (L) 11/25/2024     Lab Results   Component Value Date    LABCA2 44.6 (H) 11/25/2024      Lab Results   Component Value Date     37.7 (H) 11/25/2024       ASSESSMENT & PLAN:  Kristyn Goff is a very pleasant 75 y.o. female with Stage IIIA (pT2(m)N2aMX moderately differentiated, multifocal invasive ductal carcinoma with micropapillary features.  Tumors were 30 mm and 4 mm in diameter.  There was associated high grade DCIS.  Surgical margins were clear.  She had  involvement of 3/4 SLN and 1/1 other LN.  Largest LN metastasis was 22 mm with extranodal extension.  Tumor was >95% 3+ ER+, 81-90% 2+ TN+, HER-2/Estelita - (0).    1.  Left Breast Cancer:  - Patient and family report that the mass was present for about 15 years before she had it evaluated.  - She is s/p successful L mastectomy w/ clear margins and prophylactic contralateral mastectomy performed by Dr. Winnie He 11/4/24.  - She is now well-healed from her surgery.  - Baseline echocardiogram performed 1/3/2025 showed low normal LVEF 55%.  - We recommended adjuvant chemotherapy to consist of DDAC x 4 with growth factor support followed by weekly Taxol x 12. We discussed potential risks benefits and side effects and she decided to proceed.  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  - Following adjuvant radiation she will require hormonal therapy.  Will plan to get DEXA closer to that time.  - She received her first cycle of dose dense AC chemotherapy on 1/16/2025.  Unfortunately shortly thereafter she came down with norovirus infection with prolific diarrhea. This thankfully resolved.   -  She completed DDAC with only and intermittent nausea controlled with antiemetics.  -  She has now started low dose weekly Taxol therapy.  She is here today for cycle 4.  She has had some poor appetite this past week, loss of taste, diarrhea and weakness/lightheadedness.  She does state today that she would like to proceed with treatment as long as labs look okay  -  Labs today without cause for concern. Will proceed.  -  Encouraged use of cryotherapy mittens/booties which she has been using .   -  F/u with MD in 2 weeks with CBCD, CMP, TSH, Vit D.      2.  Abscess L back:  - Resolved.    3.  Abnormal imaging findings:  -  She had CT CAP performed 9/25/24 which showed several tiny, nonspecific lung nodules in the R lung measuring 5-6 mm.  These were previously  described on CTPE imaging 4/30/23.  Will need f/u CT imaging after an interval.  -  She also had 3 mm enhancing lesion in the L frontal bone noted on Brain MRI 9/25/24.  Could be c/w hemangioma.  Could not completely r/o metastatic lesion.  Bone scan 9/25/24 was negative.     -  Will plan to reimage after an interval.    4.  Diarrhea  - Turned out to be related to a norovirus infection from which she is now well recovered.    -Unfortunately, she does complain today that diarrhea has returned.  She states it is not as bad as it was when she had norovirus though.  She states that she gets diarrhea every time that she eats or drinks anything, and if she does eat it happens about 3-4 times per day.  But she does not have any diarrhea as long she is not eating/drinking.  She states Imodium is somewhat helpful but not completely.  - Will obtain stool culture, GI panel, O&P and C. difficile to rule out any infectious causes.  She does deny fever/chills or any other concerning B symptoms today.  - Will give IV fluids x 1 L today for supportive care.  - Rx for Lomotil 4 times a day as needed for diarrhea.  - She does state that she can tolerate boost without having diarrhea, so encouraged the use of this multiple times per day to maintain nutrition.  She states she has been out of it for the past week.  Offered samples to her today of boost, but she states that she can get this on her own.  - Encouraged good oral hydration with water/Pedialyte/Gatorade.  - Will add mag on to labs for today.     5. Normocytic anemia:  - Likely related to chemotherapy. Nutritionally replete. CBC today stable.    6. Generalized weakness/fatigue  -  Improved with weekly TAxol.  Will monitor.  - She does report weakness/fatigue today, most likely related to current symptoms of diarrhea/poor appetite.  1 L of IV fluids given today for supportive care.  Encouraged good nutrition and oral hydration at home.      7.  Poor appetite/loss of taste    -  Patient has not been eating or drinking well due to the above issue #4.  She also states that she has again developed a loss of taste which is contributing also to her poor appetite.  - Offered Rx for appetite stimulant but patient does not want to do this at this time.  - Again encouraged good nutrition and oral hydration at home.  - 1 L of IV fluids today for supportive care.  - Likely the cause of her lightheadedness as well since she is not eating/drinking well and is having diarrhea.  Will monitor.      8.  Prophylaxis:  -  Kristyn L Samples did not receive 2024 flu vaccine.  This was recommended but declined.  -  Kristyn L Samples did not receive Prevnar vaccine.  This was recommended but declined.  -  Kristyn L Samples did not receive COVID vaccine.  This was recommended but declined.    9. ACO / DANK/Other  Quality measures  -  Kristyn L Samples did not receive 2024 flu vaccine.  This was recommended.  -  Kristyn JONES Samples reports a pain score of 0.    -  Current outpatient and discharge medications have been reconciled for the patient.  Reviewed by: JOHN Rebolledo      10. Follow Up:  -  Proceed with C4 Taxol today.  -  Use Cryotherapy mittens/booties  -  F/u with MD in 2 wks with CBCD, CMP, TSH, VIt D.          I spent 30 minutes with Kristyn Goff today.  In the office today, more than 50% of this time was spent face-to-face with her  in counseling / coordination of care, reviewing her interim medical history and counseling on the current treatment plan.  All questions were answered to her satisfaction.           Electronically Signed by: JOHN Rebolledo        CC:     JOHN Toledo DO Veronica Morgan Jones, MD

## 2025-05-02 DIAGNOSIS — C50.812 MALIGNANT NEOPLASM OF OVERLAPPING SITES OF LEFT BREAST IN FEMALE, ESTROGEN RECEPTOR POSITIVE: Primary | ICD-10-CM

## 2025-05-02 DIAGNOSIS — Z17.0 MALIGNANT NEOPLASM OF OVERLAPPING SITES OF LEFT BREAST IN FEMALE, ESTROGEN RECEPTOR POSITIVE: Primary | ICD-10-CM

## 2025-05-02 RX ORDER — HYDROCORTISONE SODIUM SUCCINATE 100 MG/2ML
100 INJECTION INTRAMUSCULAR; INTRAVENOUS AS NEEDED
OUTPATIENT
Start: 2025-05-09

## 2025-05-02 RX ORDER — FAMOTIDINE 10 MG/ML
20 INJECTION, SOLUTION INTRAVENOUS ONCE
OUTPATIENT
Start: 2025-05-09

## 2025-05-02 RX ORDER — FAMOTIDINE 10 MG/ML
20 INJECTION, SOLUTION INTRAVENOUS AS NEEDED
OUTPATIENT
Start: 2025-05-09

## 2025-05-02 RX ORDER — SODIUM CHLORIDE 9 MG/ML
20 INJECTION, SOLUTION INTRAVENOUS ONCE
OUTPATIENT
Start: 2025-05-09

## 2025-05-02 RX ORDER — DIPHENHYDRAMINE HYDROCHLORIDE 50 MG/ML
50 INJECTION, SOLUTION INTRAMUSCULAR; INTRAVENOUS AS NEEDED
OUTPATIENT
Start: 2025-05-09

## 2025-05-06 NOTE — PROGRESS NOTES
"NAME: Kristyn Goff    : 1950    DATE: 2025    DIAGNOSIS: Malignant neoplasm of upper-outer quadrant of left breast in female, estrogen receptor positive     TREATMENT HISTORY:   Left mastectomy and SLNBx  with Prophylactic Contralateral Mastectomy performed by Dr. Winnie He 24    2.      3.              CHIEF COMPLAINT:  Follow Up Breast Cancer/Toxicity Check    HISTORY OF PRESENT ILLNESS:   Kristyn Goff is a very pleasant 75 y.o. female who is being seen today in the presence of her son and daughter-in-law at the request of Maria Esther He MD for evaluation and treatment of breast cancer.  Ms. Goff says she first noticed a \"knot\" in her left breast about 15 years prior around .  Then it was maybe the size of a marble.  She says it enlarged over time until it was about the size of a lime.  She was seeing her PEP, JOHN Toledo who noticed the lump during an exam and ordered further evaluation.  She had biopsy of two areas in the L breast as well as L axillary LN and this showed invasive moderately differentiated ductal carcinoma with micropapillary features Tumor was >95% 3+ ER+, 81-90% 2+ KY+, HER-2/Estelita - (0).  L axillary LN was involved.  Dr. He took her for L mastectomy w/ SLNBx and prophylactic contralateral mastectomy as below on 24.  Patholgy showed a Stage IIIA multifocal invasive ductal carcinoma of the left breast with micropapillary features. Tumors were 30 mm, 4 mm.  There was associated high grade DCIS.  4/5 LN were involved (3/4 SLN and 1/1 other LN), the largest being 22 mm with extranodal extension.  R breast was without malignancy.    Ms. Goff is recovering reasonably well.  She has healed somewhat slowly and still has L axillary drain in place but she is making steady progress and now says she has minimal output from the axillary drain.  She is to see Dr. He on Wednesday.   She denies weight loss.  No fevers or chills. No chest pain or " "shortness of breath. No abdominal pain, n/v/d/c, no rectal bleeding.  No bony pain. No headaches or visual disturbance.  They do complain that she has developed a \"place\" on her back they are concerned about that they want me to look at today.    Of note, prior to her surgery she had CT imaging as below which showed several small, nonspecific lung nodules in the R lung (5-6 mm).  Of note similar finding was noted on CTPE protocol from 23).  MRI of the brain also showed a 3 mm enhancing lesion in the L frontal bone which could represent hemangioma although metastatic lesion could not be completely excluded.  Bone scan was clear.        INTERVAL HISTORY:  Ms. Goff presents today for follow up of breast cancer and toxicity check. She is doing ok but says she doesn't feel good.  She says she is tired.  Her stomach has been cramping.  Sometimes she gets loose stools and then other days she is relatively constipated.  All things considered, however, she is managing well.      PAST MEDICAL HISTORY:  Past Medical History:   Diagnosis Date    Breast cancer     Elevated cholesterol     GERD (gastroesophageal reflux disease)     High cholesterol    -  H/o Diverticulosis  -  H/o Hyperparathyroidism    Risk Factors:  Age of Menarche: 9 yo  Age of Menopause: around age 54  Prior Breast Disease: Denies prior bx but says this lesion was present for maybe 15 yrs before she sought attention.  Pregnancies:    Age of 1st pregnancy:23  Family History of Breast Cancer: denies  Family History of Ovarian Cancer:Denies  History of HRT use:  Denies       PAST SURGICAL HISTORY:  Past Surgical History:   Procedure Laterality Date    CATARACT EXTRACTION Right     COLONOSCOPY      GALLBLADDER SURGERY      MASTECTOMY Bilateral     PARATHYROIDECTOMY      VENOUS ACCESS DEVICE (PORT) INSERTION Right 2025    Procedure: INSERTION VENOUS ACCESS DEVICE;  Surgeon: Karen Johnston MD;  Location: [facility];  Service: General;  " Laterality: Right;   -  Parathyrodectomy  -  S/p mario cataract surgery w/ implant placement  -  CCU    FAMILY HISTORY:  Family History   Problem Relation Age of Onset    Heart attack Father     Hypertension Sister     Heart attack Sister     Diabetes Sister    -  Denies family h/o malignancy of any kind    SOCIAL HISTORY:  Social History     Socioeconomic History    Marital status:    Tobacco Use    Smoking status: Former     Types: Cigarettes    Smokeless tobacco: Never   Vaping Use    Vaping status: Never Used   Substance and Sexual Activity    Alcohol use: Never    Drug use: Never    Sexual activity: Defer   -  .  Lives alone.  Used to own a flower shop but retired around 2012.  Former smoker, quit around age 33 yo      REVIEW OF SYSTEMS:   A comprehensive 14 point review of systems was performed.  Significant findings as mentioned above.  All other systems reviewed and are negative.      MEDICATIONS:  The current medication list was reviewed in the EMR    Current Outpatient Medications:     atorvastatin (LIPITOR) 10 MG tablet, Take 2 tablets by mouth Daily., Disp: , Rfl:     Carboxymethylcellulose Sodium (EYE DROPS OP), Apply 1 drop to eye(s) as directed by provider 2 (Two) Times a Day., Disp: , Rfl:     diphenoxylate-atropine (LOMOTIL) 2.5-0.025 MG per tablet, Take 1 tablet by mouth 4 (Four) Times a Day As Needed for Diarrhea., Disp: 120 tablet, Rfl: 0    lidocaine-prilocaine (EMLA) 2.5-2.5 % cream, Apply to port-a-cath site 30 minutes prior to arrival at infusion center. Cover with plastic wrap., Disp: 30 g, Rfl: 1    loratadine (CLARITIN) 10 MG tablet, Take 1 tablet by mouth daily on the day following chemotherapy prior to growth factor injection and continue for 7 days., Disp: 7 tablet, Rfl: 5    OLANZapine (zyPREXA) 5 MG tablet, Take 1 tablet by mouth for 4 doses starting night of chemotherapy., Disp: 8 tablet, Rfl: 1    ondansetron (ZOFRAN) 8 MG tablet, Take 1 tablet by mouth 3 (Three) Times a  "Day As Needed for Nausea or Vomiting., Disp: 30 tablet, Rfl: 5    prochlorperazine (COMPAZINE) 10 MG tablet, Take 1 tablet by mouth Every 6 (Six) Hours As Needed for Nausea or Vomiting., Disp: 30 tablet, Rfl: 1    vitamin D (ERGOCALCIFEROL) 1.25 MG (03434 UT) capsule capsule, Take 1 capsule by mouth 1 (One) Time Per Week., Disp: 4 capsule, Rfl: 5    ALLERGIES:  No Known Allergies    PHYSICAL EXAM:  Vitals:    05/08/25 1231   BP: 142/74   Pulse: 120   Resp: 18   Temp: 97.1 °F (36.2 °C)   TempSrc: Temporal   SpO2: 100%   Weight: 97.7 kg (215 lb 6.4 oz)   Height: 172.7 cm (68\")   PainSc: 0-No pain     Body surface area is 2.11 meters squared.   Body mass index is 32.75 kg/m².   ECOG score: 0     General:  Awake, alert and oriented, in no distress.  Appears well.  HEENT:  Pupils are equal, round and reactive to light and accommodation, Extra-ocular movements full, Oropharyx clear, mucous membranes moist  Neck:  No JVD, thyromegaly or lymphadenopathy  CV:  Regular rate and rhythm, no murmurs, rubs or gallops  Resp:  Lungs are clear to auscultation bilaterally,n rhonchi.  PAC in R chest c/d/i  Breast:  S/p mario mastectomies.  Surgical incisions are smooth and intact without nodularity. No axillary LAD on either side.   Abd:  Soft, non-tender, non-distended, bowel sounds present, no palpable organomegaly or masses  Ext:  No clubbing, cyanosis, no lower extremity edema  Back:  Over the lower L back near the waistline, she previously had an area of cellulitis maybe 8x2.5 cm with induration / thickening and erythema of the skin.  There was some element of abscess formation with purulent drainage.  This is now completely resolved..  Lymph:  No cervical, supraclavicular, axillary, inguinal or femoral adenopathy  Neuro:  MS as above, CN II-XII intact, grossly non-focal exam      PATHOLOGY:  8/28/24 11/4/24            ENDOSCOPY:        IMAGING:  Mammo Diagnostic Digital Tomosynthesis Bilateral With CAD (07/19/2024 " 10:42) & US Breast Left Limited (07/19/2024 11:35)   FINDINGS:    Irregular hypodense mass upper outer left breast, anterior depth, 1-2 o'clock radian, with pleomorphic calcifications, measures 3.9 x 2.4 cm (image 40 MLO, image 37 CC).      Irregular lesion with coarse calcification is seen at left 2-3 o'clock radian, posterior depth measuring 1.7 x 1.5 cm (image 26 MLO, image 23 CC).      Enlarged left axillary lymph nodes are seen.      Benign secretory calcifications are seen bilaterally.      No additional suspicious masses, tissue distortion, or suspicious calcifications are identified.      Ultrasound:      Real time, targeted, technologist performed sonographic imaging of the left breast was performed utilizing a multihertz transducer.       Ultrasound was performed in the left upper outer quadrant and axilla.      --- Lobulated mass is seen at left 11-2 o'clock radian, 4 cm from the nipple, central vascular flow, 5.0 x 2.9 x 3.4 cm. This corresponds to the mammogram.    --- Spiculated lesion at 2-3 o'clock radian, 9 cm from the nipple, measuring 1.7 x 1.0 x 1.1 cm. This corresponds to the distortion seen on mammogram.      Enlarging nodes are seen in the left axilla. The largest node measures 2.0 x 2.5 x 1.3 cm.     IMPRESSION:  1.   No mammographic evidence of malignancy is are seen in the right breast.        2.   Breast masses at left 11-3 o'clock radian. These are highly suspicious for malignant process.    3. Enlarging nodes in the left axilla which are suspicious for metastatic disease.         RECOMMENDED FOLLOWUP: Ultrasound guided biopsy of the large mass at 11-2 o'clock radian, left 2-3 o'clock radian and one of the left axillary lymph nodes.      BI-RADS category 5: Highly suggestive of malignancy.       MRI Abdomen With & Without Contrast (08/14/2024 14:05)   FINDINGS:      LOWER CHEST: Lung bases are clear. Small hiatal hernia.      LIVER: Normal contours. No mass. Vascular shunt in the dome  (for example on bolus phase series, image 119). No steatosis. No intrahepatic biliary dilatation. Portal and hepatic veins are patent.      GALLBLADDER: Removed.      COMMON BILE DUCT: Normal caliber. No stones.       SPLEEN: Within normal limits.      PANCREAS: No mass. No pancreatic fluid collections. The pancreatic duct is normal.      ADRENALS: No masses.      KIDNEYS: No solid left renal lesion identified, to correspond to the abnormality described on recent ultrasound. A parapelvic cyst in the left kidney measuring 2 cm. A couple of small cortical cysts in the right kidney measuring up to 1.3 cm. No hydronephrosis.      LYMPH NODES: No adenopathy.      STOMACH, SMALL BOWEL AND COLON: No bowel wall thickening or obstruction.      PERITONEAL CAVITY: No mesenteric stranding or free fluid.      ABDOMINAL AORTA: No aneurysm.      SOFT TISSUES: Normal.      OSSEOUS STRUCTURES: No acute fracture or destructive lesion.     IMPRESSION:  1.   No solid left renal lesion identified, to correspond to the abnormality described on recent ultrasound. Few bilateral renal cysts, including a parapelvic cyst in the left kidney measuring 2 cm.   2.   Additional noncontributory findings described above.     Mammo Diagnostic Digital Tomosynthesis Left With CAD (08/28/2024 09:46) & US Breast Left Limited (08/28/2024 11:11)   FINDINGS:   Findings: There are multiple suspicious findings in the left breast as listed below:     Finding 1: There is a irregular mass with associated calcifications abutting the skin, measuring mammographically 45 x 42 x 30 mm. Targeted left breast ultrasound at the 1:00 position, 4 cm from the revealed a corresponding sonographic irregular mass with calcifications measuring sonographically 4.6 x 2.7 x 3 cm. This extends to the skin. Finding is hard Elastography. Finding is highly suspicious.     Finding 2: There is a irregular mass is associated architectural distortion and coarse central calcification as  well as fine: calcifications in the approximate 2:00 position of the left breast middle to posterior depth, 13 cm from the nipple. Targeted left breast ultrasound the 2:00 position 13 cm from the reveals an irregular 8 x 6 x 6 mm mass, with adjacent vascularity, intermediate Elastography.   Finding is highly suspicious.     Finding 3: In the 12:00 position of the left breast there is a 45 mm span of fine pleomorphic calcifications with linear/segmental distribution with associated subtle asymmetry best appreciated in the magnified views, full field CC, slice 40 of 79 as well as ML slice 47/91. These are located approximately 8 cm from nipple. Targeted right breast ultrasound in the left breast at the 12:00 position 8 cm from the nipple identifies an irregular mass, soft on Elastography, measuring 14 x 11 x 12 mm, which appears underestimated sonographically compared to mammographic span of 45 mm linear calcifications. Finding is highly suspicious     Finding 4: There are additional small highly suspicious asymmetries with microcalcifications scattered throughout the inferior left breast and lower inner quadrant in the approximate 6:00-9:00 position middle to posterior depth: The total span of these lower inner quadrant asymmetries measures up to 10 x 7 x 6 cm as best appreciated on CC full-field, slice 13 of 79 and ML slice 27 out of 92. Findings are highly suspicious indicating multicentric disease.     Finding 5: There are  2 abnormal lymph nodes in the left axilla corresponding to completely replaced lymph nodes labeled as lymph node1 and 2 on US; these are highly suspicious with no demonstration of fatty hilum. This lymph nodes measure respectively 22 mm and 18 mm each, adjacent to each other.       Right breast: A repeat right diagnostic mammogram was not performed as review of outside right breast mammogram did not reveal any suspicious areas of concern.     IMPRESSION:  BI-RADS Code: BI-RADS 5, Highly  suggestive of malignancy.   Multicentric left breast disease with highly suspicious at least 2 left axillary lymph nodes     RECOMMENDATIONS:   Left Breast Recommendations: Ultrasound Guided Breast Biopsy for findings 1 and 2 to confirm multicentric disease.     Fine Needle Aspiration     Right diagnostic mammogram in one year.     US Axilla Left (09/12/2024 12:02)   FINDINGS:   Final mammographic images demonstrate the localizer tag to be located within the left axillary lymph node and adjacent to the twirl clip.     Left Breast Density: The breast tissue is heterogeneously dense, which may obscure small masses.     IMPRESSION:  Successful localization of the left axillary lymph node with a pink smartclip.     MR Head w and wo IV Contrast (09/25/2024 08:32)   Findings:     No enhancing lesions to suggest intracranial metastatic disease.     No evidence of restricted diffusion to suggest acute territorial infarct.     No evidence of mass effect, midline shift, ration, hydrocephalus.     No acute appearing intracranial hemorrhage.  No extra-axial fluid collections.  Scattered periventricular and subcortical T2/FLAIR hyperintense foci within the supratentorial brain, probably related to small vessel ischemic changes.  Age-appropriate ventricular size and configuration.     Basal cisterns are patent.  Major intracranial flow voids are preserved.     Paranasal sinuses  are clear.  Mastoid cells are clear.  Postsurgical changes of the orbits.     3 mm enhancing lesion within the left frontal bone could be related to hemangioma (series 6 image 20, series 13 image 20), although underlying metastatic disease cannot be totally excluded.  Attention to this area on follow-up imaging.     No additional calvarial lesions.     Impression:  No abnormal intraparenchymal postcontrast enhancement to suggest intraparenchymal metastatic disease.      No acute intracranial process.  Sequelae of small vessel ischemic changes.     3 mm  enhancing lesion within the left frontal bone could be related to hemangioma (series 6 image 20, series 13 image 20), although underlying metastatic disease cannot be totally excluded.  Attention to this area on follow-up imaging.     CT Abdomen Pelvis With Contrast (09/25/2024 12:40)   FINDINGS:   Mediastinum and Pleura: No mediastinal or hilar adenopathy. No pleural or pericardial effusion.     Lungs: 5 mm peripheral right upper lobe nodule, image 49 of series 3. Subpleural 6 mm peripheral right middle lobe nodule, image 58 series 3. Subpleural 6 mm right basal nodule, image 73 of series 3. No acute airspace disease. The central airways are unremarkable.     Abdomen and Pelvis: No focal hepatic lesions. Gallbladder is removed. No biliary dilatation. No splenic lesions. Normal adrenal glands. No pancreatic lesions or peripancreatic stranding. Exophytic right renal cortical cysts. No suspicious renal parenchymal lesions. No renal collecting system dilatation.     Calcifications involving abdominal aorta, bilateral iliac vessels and SMA, with associated moderate to significant stenosis of the distal SMA trunk.     No enlarged lymph nodes. No mesenteric or retroperitoneal nodularity. No free fluid or air within the abdomen or pelvis.     Unremarkable stomach. Normal caliber of small and large bowel. Distal colonic diverticulosis without evidence of complications.     Urinary bladder is within normal limits. No abnormal soft tissue densities in the regions of adnexa.     Musculoskeletal: Right breast is only partially included, without discrete suspicious lesions noted. There is a biopsied lesion within left breast, image 34 of series 2, measures up to 2.1 cm. There is also a left axillary lymphadenopathy, status post biopsy, with example of left axillary lymph node on image 37 of series 2 measuring up to 1.7 cm.     No aggressive osseous lesions or acute fractures.     IMPRESSION:  Left breast lesion and left axillary  lymphadenopathy, representing known breast cancer with metastasis.   Several peripheral right lung nodules, nonspecific.   No enlarged mediastinal lymph nodes.   No suspicious lesions within the abdomen or pelvis.   No suspicious osseous lesions are identified.     CT Chest With Contrast Diagnostic (09/25/2024 12:40)   FINDINGS:   Mediastinum and Pleura: No mediastinal or hilar adenopathy. No pleural or pericardial effusion.     Lungs: 5 mm peripheral right upper lobe nodule, image 49 of series 3. Subpleural 6 mm peripheral right middle lobe nodule, image 58 series 3. Subpleural 6 mm right basal nodule, image 73 of series 3. No acute airspace disease. The central airways are unremarkable.     Abdomen and Pelvis: No focal hepatic lesions. Gallbladder is removed. No biliary dilatation. No splenic lesions. Normal adrenal glands. No pancreatic lesions or peripancreatic stranding. Exophytic right renal cortical cysts. No suspicious renal parenchymal lesions. No renal collecting system dilatation.     Calcifications involving abdominal aorta, bilateral iliac vessels and SMA, with associated moderate to significant stenosis of the distal SMA trunk.     No enlarged lymph nodes. No mesenteric or retroperitoneal nodularity. No free fluid or air within the abdomen or pelvis.     Unremarkable stomach. Normal caliber of small and large bowel. Distal colonic diverticulosis without evidence of complications.     Urinary bladder is within normal limits. No abnormal soft tissue densities in the regions of adnexa.     Musculoskeletal: Right breast is only partially included, without discrete suspicious lesions noted. There is a biopsied lesion within left breast, image 34 of series 2, measures up to 2.1 cm. There is also a left axillary lymphadenopathy, status post biopsy, with example of left axillary lymph node on image 37 of series 2 measuring up to 1.7 cm.     No aggressive osseous lesions or acute fractures.      IMPRESSION:  Left breast lesion and left axillary lymphadenopathy, representing known breast cancer with metastasis.   Several peripheral right lung nodules, nonspecific.   No enlarged mediastinal lymph nodes.   No suspicious lesions within the abdomen or pelvis.   No suspicious osseous lesions are identified.     NM Bone Scan Whole Body (09/25/2024 13:41)   FINDINGS:   Bones: No sites of focal increased (hot) and decreased (cold) uptake in axial and appendicular skeleton to suggest bone metastasis.     Bones/Joints: Sites of mild uptake consistent with benign arthritic, degenerative or post traumatic changes.     Soft-tissues: Asymmetric mild uptake in the left breast soft tissues relative to the right. Two kidneys visualized.     IMPRESSION:  No evidence of osseous metastatic disease.     Adult Transthoracic Echo Complete W/ Cont if Necessary Per Protocol (01/03/2025 09:52)   Interpretation Summary    Left ventricular systolic function is normal. Estimated left ventricular EF = 55%    Left ventricular wall thickness is consistent with moderate concentric hypertrophy.    Left ventricular diastolic function is consistent with (grade I) impaired relaxation.    Estimated right ventricular systolic pressure from tricuspid regurgitation is normal (<35 mmHg).    RECENT LABS:  Lab Results   Component Value Date    WBC 8.95 05/08/2025    HGB 9.0 (L) 05/08/2025    HCT 28.7 (L) 05/08/2025    .1 (H) 05/08/2025    RDW 15.6 (H) 05/08/2025     05/08/2025    NEUTRORELPCT 80.6 (H) 05/08/2025    LYMPHORELPCT 6.3 (L) 05/08/2025    MONORELPCT 11.3 05/08/2025    EOSRELPCT 0.4 05/08/2025    BASORELPCT 0.3 05/08/2025    NEUTROABS 7.21 (H) 05/08/2025    LYMPHSABS 0.56 (L) 05/08/2025       Lab Results   Component Value Date     (L) 04/25/2025    K 3.3 (L) 04/25/2025    CO2 20.2 (L) 04/25/2025    CL 96 (L) 04/25/2025    BUN 21 04/25/2025    CREATININE 1.44 (H) 04/25/2025    GLUCOSE 154 (H) 04/25/2025    CALCIUM 9.0  04/25/2025    ALKPHOS 109 04/25/2025    AST 20 04/25/2025    ALT 20 04/25/2025    BILITOT 0.5 04/25/2025    ALBUMIN 3.7 04/25/2025    PROTEINTOT 6.7 04/25/2025    MG 1.6 04/25/2025     Lab Results   Component Value Date    FERRITIN 384.20 (H) 02/28/2025    IRON 92 02/28/2025    TIBC 352 02/28/2025    LABIRON 26 02/28/2025    NNNSNPWJ38 335 11/25/2024    FOLATE 7.32 11/25/2024     Lab Results   Component Value Date    TSH 2.790 11/25/2024     Lab Results   Component Value Date    FPQI31HM 26.3 (L) 11/25/2024     Lab Results   Component Value Date    LABCA2 44.6 (H) 11/25/2024      Lab Results   Component Value Date     37.7 (H) 11/25/2024       ASSESSMENT & PLAN:  Kristyn Goff is a very pleasant 75 y.o. female with Stage IIIA (pT2(m)N2aMX moderately differentiated, multifocal invasive ductal carcinoma with micropapillary features.  Tumors were 30 mm and 4 mm in diameter.  There was associated high grade DCIS.  Surgical margins were clear.  She had involvement of 3/4 SLN and 1/1 other LN.  Largest LN metastasis was 22 mm with extranodal extension.  Tumor was >95% 3+ ER+, 81-90% 2+ NJ+, HER-2/Estelita - (0).    1.  Left Breast Cancer:  - Patient and family report that the mass was present for about 15 years before she had it evaluated.  - She is s/p successful L mastectomy w/ clear margins and prophylactic contralateral mastectomy performed by Dr. Winnie He 11/4/24.  - She is now well-healed from her surgery.  - Baseline echocardiogram performed 1/3/2025 showed low normal LVEF 55%.  - We recommended adjuvant chemotherapy to consist of DDAC x 4 with growth factor support followed by weekly Taxol x 12. We discussed potential risks benefits and side effects and she decided to proceed.  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  - Following adjuvant radiation she will require hormonal therapy.  Will plan to get DEXA closer to that time.  - She  received her first cycle of dose dense AC chemotherapy on 1/16/2025.  Unfortunately shortly thereafter she came down with norovirus infection with prolific diarrhea. This thankfully resolved.   -  She completed DDAC with only and intermittent nausea controlled with antiemetics.  -  She has now started low dose weekly Taxol therapy.  She is here today for cycle 5.  Will proceed with treatment as planned.  Discussed trial of Miralax for when she gets constipated.  Also encouraged to make sure to drink plenty of fluids.  -  Continue use of cryotherapy mittens/booties which she has been using .   -  F/u with APRN w treatment labs 5/29/25 and with me on 6/12/25 with CBCD, CMP, TSH, Vit D.    2.  Abscess L back:  - Resolved.    3.  Abnormal imaging findings:  -  She had CT CAP performed 9/25/24 which showed several tiny, nonspecific lung nodules in the R lung measuring 5-6 mm.  These were previously described on CTPE imaging 4/30/23.  Will need f/u CT imaging after an interval.  -  She also had 3 mm enhancing lesion in the L frontal bone noted on Brain MRI 9/25/24.  Could be c/w hemangioma.  Could not completely r/o metastatic lesion.  Bone scan 9/25/24 was negative.     -  Will plan to reimage after an interval.    4.  Diarrhea  - Turned out to be related to a norovirus infection from which she is now well recovered.    -  She does get periodic diarrhea followed by constipation.  Encouraged to drink plenty of fluids.  She has loperamide / lomotil to use as needed for diarrhea.  Can use Miralax when needed for constipation.      5. Normocytic anemia:  - Likely related to chemotherapy. Nutritionally replete. CBC today stable.    6. Generalized weakness/fatigue  -  Improved with weekly Taxol.  Will monitor.  - She does report weakness/fatigue today, most likely related to current symptoms of diarrhea/poor appetite.  1 L of IV fluids given today for supportive care.  Encouraged good nutrition and oral hydration at home.       7.  Poor appetite/loss of taste    -  Declined appetite stimulant.    -  Encouraged to continue oral hydration and to continue use of nutritional supplements to help support nutrition until appetite recovers.      8.  Prophylaxis:  -  Kristyn Goff did not receive 2024 flu vaccine.  This was recommended but declined.  -  Kristyn JONES Samples did not receive Prevnar vaccine.  This was recommended but declined.  -  Kristyn JONES Samples did not receive COVID vaccine.  This was recommended but declined.    9. ACO / DANK/Other  Quality measures  -  Kristyn Goff did not receive 2024 flu vaccine.  This was recommended.  -  Kristyn Goff reports a pain score of 0.    -  Current outpatient and discharge medications have been reconciled for the patient.  Reviewed by: Kaylee Tellez MD      10. Follow Up:  -  Proceed with C5 Taxol today.  -  Use Cryotherapy mittens/booties  -  Continue to encourage oral hydration, nutritional supplementation  -  F/u with APRN 5/29/25 w/ treatment labs  -  F/u with me 6/12/25 w/ CBCD, CMP, TSH, Vit D.          I spent 30 minutes with Kristyn Goff today.  In the office today, more than 50% of this time was spent face-to-face with her  in counseling / coordination of care, reviewing her interim medical history and counseling on the current treatment plan.  All questions were answered to her satisfaction.           Electronically Signed by: Kaylee Tellez MD          CC:     JOHN Toledo DO Veronica Morgan Jones, MD

## 2025-05-08 ENCOUNTER — OFFICE VISIT (OUTPATIENT)
Dept: ONCOLOGY | Facility: CLINIC | Age: 75
End: 2025-05-08
Payer: MEDICARE

## 2025-05-08 ENCOUNTER — INFUSION (OUTPATIENT)
Dept: ONCOLOGY | Facility: HOSPITAL | Age: 75
End: 2025-05-08
Payer: MEDICARE

## 2025-05-08 ENCOUNTER — LAB (OUTPATIENT)
Dept: ONCOLOGY | Facility: CLINIC | Age: 75
End: 2025-05-08
Payer: MEDICARE

## 2025-05-08 VITALS
RESPIRATION RATE: 20 BRPM | TEMPERATURE: 97.5 F | HEART RATE: 118 BPM | DIASTOLIC BLOOD PRESSURE: 55 MMHG | OXYGEN SATURATION: 100 % | SYSTOLIC BLOOD PRESSURE: 124 MMHG

## 2025-05-08 VITALS
OXYGEN SATURATION: 100 % | HEART RATE: 120 BPM | HEIGHT: 68 IN | DIASTOLIC BLOOD PRESSURE: 74 MMHG | BODY MASS INDEX: 32.64 KG/M2 | SYSTOLIC BLOOD PRESSURE: 142 MMHG | RESPIRATION RATE: 18 BRPM | TEMPERATURE: 97.1 F | WEIGHT: 215.4 LBS

## 2025-05-08 DIAGNOSIS — Z17.0 MALIGNANT NEOPLASM OF OVERLAPPING SITES OF LEFT BREAST IN FEMALE, ESTROGEN RECEPTOR POSITIVE: Primary | ICD-10-CM

## 2025-05-08 DIAGNOSIS — D64.9 NORMOCYTIC ANEMIA: ICD-10-CM

## 2025-05-08 DIAGNOSIS — R53.83 OTHER FATIGUE: ICD-10-CM

## 2025-05-08 DIAGNOSIS — D64.9 NORMOCYTIC ANEMIA: Primary | ICD-10-CM

## 2025-05-08 DIAGNOSIS — C50.812 MALIGNANT NEOPLASM OF OVERLAPPING SITES OF LEFT BREAST IN FEMALE, ESTROGEN RECEPTOR POSITIVE: ICD-10-CM

## 2025-05-08 DIAGNOSIS — C50.812 MALIGNANT NEOPLASM OF OVERLAPPING SITES OF LEFT BREAST IN FEMALE, ESTROGEN RECEPTOR POSITIVE: Primary | ICD-10-CM

## 2025-05-08 DIAGNOSIS — E55.9 VITAMIN D DEFICIENCY: ICD-10-CM

## 2025-05-08 DIAGNOSIS — Z17.0 MALIGNANT NEOPLASM OF OVERLAPPING SITES OF LEFT BREAST IN FEMALE, ESTROGEN RECEPTOR POSITIVE: ICD-10-CM

## 2025-05-08 DIAGNOSIS — D84.9 IMMUNODEFICIENCY, UNSPECIFIED: ICD-10-CM

## 2025-05-08 LAB
25(OH)D3 SERPL-MCNC: 41.7 NG/ML (ref 30–100)
ALBUMIN SERPL-MCNC: 3.1 G/DL (ref 3.5–5.2)
ALBUMIN/GLOB SERPL: 0.9 G/DL
ALP SERPL-CCNC: 116 U/L (ref 39–117)
ALT SERPL W P-5'-P-CCNC: 12 U/L (ref 1–33)
ANION GAP SERPL CALCULATED.3IONS-SCNC: 14.7 MMOL/L (ref 5–15)
AST SERPL-CCNC: 18 U/L (ref 1–32)
BASOPHILS # BLD AUTO: 0.03 10*3/MM3 (ref 0–0.2)
BASOPHILS NFR BLD AUTO: 0.3 % (ref 0–1.5)
BILIRUB SERPL-MCNC: 0.5 MG/DL (ref 0–1.2)
BUN SERPL-MCNC: 10 MG/DL (ref 8–23)
BUN/CREAT SERPL: 11.2 (ref 7–25)
CALCIUM SPEC-SCNC: 8.9 MG/DL (ref 8.6–10.5)
CHLORIDE SERPL-SCNC: 97 MMOL/L (ref 98–107)
CO2 SERPL-SCNC: 24.3 MMOL/L (ref 22–29)
CREAT SERPL-MCNC: 0.89 MG/DL (ref 0.57–1)
DEPRECATED RDW RBC AUTO: 57.1 FL (ref 37–54)
EGFRCR SERPLBLD CKD-EPI 2021: 67.3 ML/MIN/1.73
EOSINOPHIL # BLD AUTO: 0.04 10*3/MM3 (ref 0–0.4)
EOSINOPHIL NFR BLD AUTO: 0.4 % (ref 0.3–6.2)
ERYTHROCYTE [DISTWIDTH] IN BLOOD BY AUTOMATED COUNT: 15.6 % (ref 12.3–15.4)
GLOBULIN UR ELPH-MCNC: 3.4 GM/DL
GLUCOSE SERPL-MCNC: 150 MG/DL (ref 65–99)
HCT VFR BLD AUTO: 28.7 % (ref 34–46.6)
HGB BLD-MCNC: 9 G/DL (ref 12–15.9)
IMM GRANULOCYTES # BLD AUTO: 0.1 10*3/MM3 (ref 0–0.05)
IMM GRANULOCYTES NFR BLD AUTO: 1.1 % (ref 0–0.5)
LYMPHOCYTES # BLD AUTO: 0.56 10*3/MM3 (ref 0.7–3.1)
LYMPHOCYTES NFR BLD AUTO: 6.3 % (ref 19.6–45.3)
MCH RBC QN AUTO: 31.7 PG (ref 26.6–33)
MCHC RBC AUTO-ENTMCNC: 31.4 G/DL (ref 31.5–35.7)
MCV RBC AUTO: 101.1 FL (ref 79–97)
MONOCYTES # BLD AUTO: 1.01 10*3/MM3 (ref 0.1–0.9)
MONOCYTES NFR BLD AUTO: 11.3 % (ref 5–12)
NEUTROPHILS NFR BLD AUTO: 7.21 10*3/MM3 (ref 1.7–7)
NEUTROPHILS NFR BLD AUTO: 80.6 % (ref 42.7–76)
NRBC BLD AUTO-RTO: 0.2 /100 WBC (ref 0–0.2)
PLATELET # BLD AUTO: 270 10*3/MM3 (ref 140–450)
PMV BLD AUTO: 9.4 FL (ref 6–12)
POTASSIUM SERPL-SCNC: 3.6 MMOL/L (ref 3.5–5.2)
PROT SERPL-MCNC: 6.5 G/DL (ref 6–8.5)
RBC # BLD AUTO: 2.84 10*6/MM3 (ref 3.77–5.28)
SODIUM SERPL-SCNC: 136 MMOL/L (ref 136–145)
TSH SERPL DL<=0.05 MIU/L-ACNC: 2.36 UIU/ML (ref 0.27–4.2)
WBC NRBC COR # BLD AUTO: 8.95 10*3/MM3 (ref 3.4–10.8)

## 2025-05-08 PROCEDURE — 1126F AMNT PAIN NOTED NONE PRSNT: CPT | Performed by: INTERNAL MEDICINE

## 2025-05-08 PROCEDURE — 96413 CHEMO IV INFUSION 1 HR: CPT

## 2025-05-08 PROCEDURE — 25010000002 DIPHENHYDRAMINE PER 50 MG: Performed by: INTERNAL MEDICINE

## 2025-05-08 PROCEDURE — 25810000003 SODIUM CHLORIDE 0.9 % SOLUTION: Performed by: INTERNAL MEDICINE

## 2025-05-08 PROCEDURE — 25010000002 DEXAMETHASONE SODIUM PHOSPHATE 20 MG/5ML SOLUTION: Performed by: INTERNAL MEDICINE

## 2025-05-08 PROCEDURE — 80053 COMPREHEN METABOLIC PANEL: CPT | Performed by: INTERNAL MEDICINE

## 2025-05-08 PROCEDURE — 96367 TX/PROPH/DG ADDL SEQ IV INF: CPT

## 2025-05-08 PROCEDURE — 85025 COMPLETE CBC W/AUTO DIFF WBC: CPT | Performed by: INTERNAL MEDICINE

## 2025-05-08 PROCEDURE — 96375 TX/PRO/DX INJ NEW DRUG ADDON: CPT

## 2025-05-08 PROCEDURE — 25010000002 HEPARIN LOCK FLUSH PER 10 UNITS

## 2025-05-08 PROCEDURE — 25010000002 PACLITAXEL PER 1 MG: Performed by: INTERNAL MEDICINE

## 2025-05-08 PROCEDURE — 25810000003 SODIUM CHLORIDE 0.9 % SOLUTION 250 ML FLEX CONT: Performed by: INTERNAL MEDICINE

## 2025-05-08 PROCEDURE — 99214 OFFICE O/P EST MOD 30 MIN: CPT | Performed by: INTERNAL MEDICINE

## 2025-05-08 PROCEDURE — 82306 VITAMIN D 25 HYDROXY: CPT | Performed by: INTERNAL MEDICINE

## 2025-05-08 PROCEDURE — 25010000002 FAMOTIDINE 10 MG/ML SOLUTION: Performed by: INTERNAL MEDICINE

## 2025-05-08 PROCEDURE — 84443 ASSAY THYROID STIM HORMONE: CPT | Performed by: INTERNAL MEDICINE

## 2025-05-08 RX ORDER — SODIUM CHLORIDE 0.9 % (FLUSH) 0.9 %
10 SYRINGE (ML) INJECTION AS NEEDED
OUTPATIENT
Start: 2025-05-08

## 2025-05-08 RX ORDER — SODIUM CHLORIDE 9 MG/ML
20 INJECTION, SOLUTION INTRAVENOUS ONCE
Status: COMPLETED | OUTPATIENT
Start: 2025-05-08 | End: 2025-05-08

## 2025-05-08 RX ORDER — SODIUM CHLORIDE 0.9 % (FLUSH) 0.9 %
10 SYRINGE (ML) INJECTION AS NEEDED
Status: DISCONTINUED | OUTPATIENT
Start: 2025-05-08 | End: 2025-05-08 | Stop reason: HOSPADM

## 2025-05-08 RX ORDER — HEPARIN SODIUM (PORCINE) LOCK FLUSH IV SOLN 100 UNIT/ML 100 UNIT/ML
500 SOLUTION INTRAVENOUS AS NEEDED
OUTPATIENT
Start: 2025-05-08

## 2025-05-08 RX ORDER — POTASSIUM CHLORIDE 1500 MG/1
TABLET, EXTENDED RELEASE ORAL
Qty: 8 TABLET | Refills: 0 | Status: SHIPPED | OUTPATIENT
Start: 2025-05-08 | End: 2025-05-19

## 2025-05-08 RX ORDER — HEPARIN SODIUM (PORCINE) LOCK FLUSH IV SOLN 100 UNIT/ML 100 UNIT/ML
500 SOLUTION INTRAVENOUS AS NEEDED
Status: DISCONTINUED | OUTPATIENT
Start: 2025-05-08 | End: 2025-05-08 | Stop reason: HOSPADM

## 2025-05-08 RX ORDER — FAMOTIDINE 10 MG/ML
20 INJECTION, SOLUTION INTRAVENOUS ONCE
Status: COMPLETED | OUTPATIENT
Start: 2025-05-08 | End: 2025-05-08

## 2025-05-08 RX ADMIN — FAMOTIDINE 20 MG: 10 INJECTION INTRAVENOUS at 13:59

## 2025-05-08 RX ADMIN — Medication 10 ML: at 15:52

## 2025-05-08 RX ADMIN — DEXAMETHASONE SODIUM PHOSPHATE 12 MG: 4 INJECTION, SOLUTION INTRA-ARTICULAR; INTRALESIONAL; INTRAMUSCULAR; INTRAVENOUS; SOFT TISSUE at 14:17

## 2025-05-08 RX ADMIN — SODIUM CHLORIDE 20 ML/HR: 9 INJECTION, SOLUTION INTRAVENOUS at 13:59

## 2025-05-08 RX ADMIN — HEPARIN 500 UNITS: 100 SYRINGE at 15:52

## 2025-05-08 RX ADMIN — PACLITAXEL 165 MG: 6 INJECTION, SOLUTION, CONCENTRATE INTRAVENOUS at 14:47

## 2025-05-08 RX ADMIN — DIPHENHYDRAMINE HYDROCHLORIDE 25 MG: 50 INJECTION, SOLUTION INTRAMUSCULAR; INTRAVENOUS at 14:01

## 2025-05-08 NOTE — PROGRESS NOTES
Venipuncture Blood Specimen Collection  Venipuncture performed in left arm by Kailyn Sparrow MA with good hemostasis. Patient tolerated the procedure well without complications.   05/08/25   Kailyn Sparrow MA

## 2025-05-15 DIAGNOSIS — Z17.0 MALIGNANT NEOPLASM OF OVERLAPPING SITES OF LEFT BREAST IN FEMALE, ESTROGEN RECEPTOR POSITIVE: Primary | ICD-10-CM

## 2025-05-15 DIAGNOSIS — C50.812 MALIGNANT NEOPLASM OF OVERLAPPING SITES OF LEFT BREAST IN FEMALE, ESTROGEN RECEPTOR POSITIVE: Primary | ICD-10-CM

## 2025-05-15 RX ORDER — FAMOTIDINE 10 MG/ML
20 INJECTION, SOLUTION INTRAVENOUS AS NEEDED
OUTPATIENT
Start: 2025-05-23

## 2025-05-15 RX ORDER — SODIUM CHLORIDE 9 MG/ML
20 INJECTION, SOLUTION INTRAVENOUS ONCE
OUTPATIENT
Start: 2025-05-23

## 2025-05-15 RX ORDER — DIPHENHYDRAMINE HYDROCHLORIDE 50 MG/ML
50 INJECTION, SOLUTION INTRAMUSCULAR; INTRAVENOUS AS NEEDED
OUTPATIENT
Start: 2025-05-23

## 2025-05-15 RX ORDER — FAMOTIDINE 10 MG/ML
20 INJECTION, SOLUTION INTRAVENOUS ONCE
OUTPATIENT
Start: 2025-05-23

## 2025-05-15 RX ORDER — HYDROCORTISONE SODIUM SUCCINATE 100 MG/2ML
100 INJECTION INTRAMUSCULAR; INTRAVENOUS AS NEEDED
OUTPATIENT
Start: 2025-05-23

## 2025-05-16 ENCOUNTER — INFUSION (OUTPATIENT)
Dept: ONCOLOGY | Facility: HOSPITAL | Age: 75
End: 2025-05-16
Payer: MEDICARE

## 2025-05-16 ENCOUNTER — LAB (OUTPATIENT)
Dept: ONCOLOGY | Facility: HOSPITAL | Age: 75
End: 2025-05-16
Payer: MEDICARE

## 2025-05-16 VITALS
OXYGEN SATURATION: 99 % | DIASTOLIC BLOOD PRESSURE: 56 MMHG | SYSTOLIC BLOOD PRESSURE: 120 MMHG | TEMPERATURE: 98 F | RESPIRATION RATE: 18 BRPM | HEART RATE: 113 BPM | WEIGHT: 211.5 LBS | BODY MASS INDEX: 32.16 KG/M2

## 2025-05-16 DIAGNOSIS — Z17.0 MALIGNANT NEOPLASM OF OVERLAPPING SITES OF LEFT BREAST IN FEMALE, ESTROGEN RECEPTOR POSITIVE: ICD-10-CM

## 2025-05-16 DIAGNOSIS — C50.812 MALIGNANT NEOPLASM OF OVERLAPPING SITES OF LEFT BREAST IN FEMALE, ESTROGEN RECEPTOR POSITIVE: Primary | ICD-10-CM

## 2025-05-16 DIAGNOSIS — C50.812 MALIGNANT NEOPLASM OF OVERLAPPING SITES OF LEFT BREAST IN FEMALE, ESTROGEN RECEPTOR POSITIVE: ICD-10-CM

## 2025-05-16 DIAGNOSIS — Z17.0 MALIGNANT NEOPLASM OF OVERLAPPING SITES OF LEFT BREAST IN FEMALE, ESTROGEN RECEPTOR POSITIVE: Primary | ICD-10-CM

## 2025-05-16 LAB
ALBUMIN SERPL-MCNC: 3.5 G/DL (ref 3.5–5.2)
ALBUMIN/GLOB SERPL: 1.3 G/DL
ALP SERPL-CCNC: 122 U/L (ref 39–117)
ALT SERPL W P-5'-P-CCNC: 17 U/L (ref 1–33)
ANION GAP SERPL CALCULATED.3IONS-SCNC: 14.4 MMOL/L (ref 5–15)
AST SERPL-CCNC: 26 U/L (ref 1–32)
BASOPHILS # BLD AUTO: 0.02 10*3/MM3 (ref 0–0.2)
BASOPHILS NFR BLD AUTO: 0.4 % (ref 0–1.5)
BILIRUB SERPL-MCNC: 0.4 MG/DL (ref 0–1.2)
BUN SERPL-MCNC: 16 MG/DL (ref 8–23)
BUN/CREAT SERPL: 20.3 (ref 7–25)
CALCIUM SPEC-SCNC: 9 MG/DL (ref 8.6–10.5)
CHLORIDE SERPL-SCNC: 99 MMOL/L (ref 98–107)
CO2 SERPL-SCNC: 22.6 MMOL/L (ref 22–29)
CREAT SERPL-MCNC: 0.79 MG/DL (ref 0.57–1)
DEPRECATED RDW RBC AUTO: 53.1 FL (ref 37–54)
EGFRCR SERPLBLD CKD-EPI 2021: 77.6 ML/MIN/1.73
EOSINOPHIL # BLD AUTO: 0.04 10*3/MM3 (ref 0–0.4)
EOSINOPHIL NFR BLD AUTO: 0.8 % (ref 0.3–6.2)
ERYTHROCYTE [DISTWIDTH] IN BLOOD BY AUTOMATED COUNT: 15 % (ref 12.3–15.4)
GLOBULIN UR ELPH-MCNC: 2.8 GM/DL
GLUCOSE SERPL-MCNC: 127 MG/DL (ref 65–99)
HCT VFR BLD AUTO: 27.5 % (ref 34–46.6)
HGB BLD-MCNC: 8.6 G/DL (ref 12–15.9)
IMM GRANULOCYTES # BLD AUTO: 0.04 10*3/MM3 (ref 0–0.05)
IMM GRANULOCYTES NFR BLD AUTO: 0.8 % (ref 0–0.5)
LYMPHOCYTES # BLD AUTO: 0.66 10*3/MM3 (ref 0.7–3.1)
LYMPHOCYTES NFR BLD AUTO: 12.4 % (ref 19.6–45.3)
MCH RBC QN AUTO: 30.6 PG (ref 26.6–33)
MCHC RBC AUTO-ENTMCNC: 31.3 G/DL (ref 31.5–35.7)
MCV RBC AUTO: 97.9 FL (ref 79–97)
MONOCYTES # BLD AUTO: 0.49 10*3/MM3 (ref 0.1–0.9)
MONOCYTES NFR BLD AUTO: 9.2 % (ref 5–12)
NEUTROPHILS NFR BLD AUTO: 4.07 10*3/MM3 (ref 1.7–7)
NEUTROPHILS NFR BLD AUTO: 76.4 % (ref 42.7–76)
NRBC BLD AUTO-RTO: 1.1 /100 WBC (ref 0–0.2)
PLATELET # BLD AUTO: 250 10*3/MM3 (ref 140–450)
PMV BLD AUTO: 10.1 FL (ref 6–12)
POTASSIUM SERPL-SCNC: 3.9 MMOL/L (ref 3.5–5.2)
PROT SERPL-MCNC: 6.3 G/DL (ref 6–8.5)
RBC # BLD AUTO: 2.81 10*6/MM3 (ref 3.77–5.28)
SODIUM SERPL-SCNC: 136 MMOL/L (ref 136–145)
WBC NRBC COR # BLD AUTO: 5.32 10*3/MM3 (ref 3.4–10.8)

## 2025-05-16 PROCEDURE — 25810000003 SODIUM CHLORIDE 0.9 % SOLUTION 250 ML FLEX CONT: Performed by: INTERNAL MEDICINE

## 2025-05-16 PROCEDURE — 25010000002 DIPHENHYDRAMINE PER 50 MG: Performed by: INTERNAL MEDICINE

## 2025-05-16 PROCEDURE — 25010000002 DEXAMETHASONE SODIUM PHOSPHATE 20 MG/5ML SOLUTION: Performed by: INTERNAL MEDICINE

## 2025-05-16 PROCEDURE — 96375 TX/PRO/DX INJ NEW DRUG ADDON: CPT

## 2025-05-16 PROCEDURE — 96413 CHEMO IV INFUSION 1 HR: CPT

## 2025-05-16 PROCEDURE — 25010000002 FAMOTIDINE 10 MG/ML SOLUTION: Performed by: INTERNAL MEDICINE

## 2025-05-16 PROCEDURE — 25810000003 SODIUM CHLORIDE 0.9 % SOLUTION: Performed by: INTERNAL MEDICINE

## 2025-05-16 PROCEDURE — 80053 COMPREHEN METABOLIC PANEL: CPT

## 2025-05-16 PROCEDURE — 85025 COMPLETE CBC W/AUTO DIFF WBC: CPT

## 2025-05-16 PROCEDURE — 25010000002 PACLITAXEL PER 1 MG: Performed by: INTERNAL MEDICINE

## 2025-05-16 PROCEDURE — 25010000002 HEPARIN LOCK FLUSH PER 10 UNITS

## 2025-05-16 RX ORDER — HEPARIN SODIUM (PORCINE) LOCK FLUSH IV SOLN 100 UNIT/ML 100 UNIT/ML
500 SOLUTION INTRAVENOUS AS NEEDED
OUTPATIENT
Start: 2025-05-16

## 2025-05-16 RX ORDER — HEPARIN SODIUM (PORCINE) LOCK FLUSH IV SOLN 100 UNIT/ML 100 UNIT/ML
500 SOLUTION INTRAVENOUS AS NEEDED
Status: DISCONTINUED | OUTPATIENT
Start: 2025-05-16 | End: 2025-05-16 | Stop reason: HOSPADM

## 2025-05-16 RX ORDER — FAMOTIDINE 10 MG/ML
20 INJECTION, SOLUTION INTRAVENOUS ONCE
Status: COMPLETED | OUTPATIENT
Start: 2025-05-16 | End: 2025-05-16

## 2025-05-16 RX ORDER — SODIUM CHLORIDE 9 MG/ML
20 INJECTION, SOLUTION INTRAVENOUS ONCE
Status: COMPLETED | OUTPATIENT
Start: 2025-05-16 | End: 2025-05-16

## 2025-05-16 RX ORDER — SODIUM CHLORIDE 0.9 % (FLUSH) 0.9 %
10 SYRINGE (ML) INJECTION AS NEEDED
Status: DISCONTINUED | OUTPATIENT
Start: 2025-05-16 | End: 2025-05-16 | Stop reason: HOSPADM

## 2025-05-16 RX ORDER — SODIUM CHLORIDE 0.9 % (FLUSH) 0.9 %
10 SYRINGE (ML) INJECTION AS NEEDED
OUTPATIENT
Start: 2025-05-16

## 2025-05-16 RX ADMIN — PACLITAXEL 165 MG: 6 INJECTION, SOLUTION INTRAVENOUS at 14:58

## 2025-05-16 RX ADMIN — DIPHENHYDRAMINE HYDROCHLORIDE 25 MG: 50 INJECTION, SOLUTION INTRAMUSCULAR; INTRAVENOUS at 14:11

## 2025-05-16 RX ADMIN — HEPARIN 500 UNITS: 100 SYRINGE at 16:03

## 2025-05-16 RX ADMIN — DEXAMETHASONE SODIUM PHOSPHATE 12 MG: 4 INJECTION, SOLUTION INTRA-ARTICULAR; INTRALESIONAL; INTRAMUSCULAR; INTRAVENOUS; SOFT TISSUE at 14:29

## 2025-05-16 RX ADMIN — Medication 10 ML: at 16:03

## 2025-05-16 RX ADMIN — FAMOTIDINE 20 MG: 10 INJECTION INTRAVENOUS at 14:11

## 2025-05-16 RX ADMIN — SODIUM CHLORIDE 20 ML/HR: 9 INJECTION, SOLUTION INTRAVENOUS at 14:11

## 2025-05-23 ENCOUNTER — APPOINTMENT (OUTPATIENT)
Dept: ONCOLOGY | Facility: HOSPITAL | Age: 75
End: 2025-05-23
Payer: MEDICARE

## 2025-05-23 ENCOUNTER — INFUSION (OUTPATIENT)
Dept: ONCOLOGY | Facility: HOSPITAL | Age: 75
End: 2025-05-23
Payer: MEDICARE

## 2025-05-23 VITALS
TEMPERATURE: 98 F | DIASTOLIC BLOOD PRESSURE: 57 MMHG | SYSTOLIC BLOOD PRESSURE: 108 MMHG | OXYGEN SATURATION: 99 % | HEART RATE: 105 BPM | RESPIRATION RATE: 18 BRPM

## 2025-05-23 DIAGNOSIS — Z17.0 MALIGNANT NEOPLASM OF OVERLAPPING SITES OF LEFT BREAST IN FEMALE, ESTROGEN RECEPTOR POSITIVE: Primary | ICD-10-CM

## 2025-05-23 DIAGNOSIS — C50.812 MALIGNANT NEOPLASM OF OVERLAPPING SITES OF LEFT BREAST IN FEMALE, ESTROGEN RECEPTOR POSITIVE: Primary | ICD-10-CM

## 2025-05-23 LAB
ALBUMIN SERPL-MCNC: 3.4 G/DL (ref 3.5–5.2)
ALBUMIN/GLOB SERPL: 1.2 G/DL
ALP SERPL-CCNC: 124 U/L (ref 39–117)
ALT SERPL W P-5'-P-CCNC: 16 U/L (ref 1–33)
ANION GAP SERPL CALCULATED.3IONS-SCNC: 16.1 MMOL/L (ref 5–15)
AST SERPL-CCNC: 24 U/L (ref 1–32)
BASOPHILS # BLD AUTO: 0.03 10*3/MM3 (ref 0–0.2)
BASOPHILS NFR BLD AUTO: 0.7 % (ref 0–1.5)
BILIRUB SERPL-MCNC: 0.5 MG/DL (ref 0–1.2)
BUN SERPL-MCNC: 13 MG/DL (ref 8–23)
BUN/CREAT SERPL: 11.2 (ref 7–25)
CALCIUM SPEC-SCNC: 8.6 MG/DL (ref 8.6–10.5)
CHLORIDE SERPL-SCNC: 98 MMOL/L (ref 98–107)
CO2 SERPL-SCNC: 21.9 MMOL/L (ref 22–29)
CREAT SERPL-MCNC: 1.16 MG/DL (ref 0.57–1)
DEPRECATED RDW RBC AUTO: 56.9 FL (ref 37–54)
EGFRCR SERPLBLD CKD-EPI 2021: 49 ML/MIN/1.73
EOSINOPHIL # BLD AUTO: 0.06 10*3/MM3 (ref 0–0.4)
EOSINOPHIL NFR BLD AUTO: 1.4 % (ref 0.3–6.2)
ERYTHROCYTE [DISTWIDTH] IN BLOOD BY AUTOMATED COUNT: 16.1 % (ref 12.3–15.4)
GLOBULIN UR ELPH-MCNC: 2.9 GM/DL
GLUCOSE SERPL-MCNC: 161 MG/DL (ref 65–99)
HCT VFR BLD AUTO: 28.6 % (ref 34–46.6)
HGB BLD-MCNC: 9.1 G/DL (ref 12–15.9)
IMM GRANULOCYTES # BLD AUTO: 0.03 10*3/MM3 (ref 0–0.05)
IMM GRANULOCYTES NFR BLD AUTO: 0.7 % (ref 0–0.5)
LYMPHOCYTES # BLD AUTO: 1.04 10*3/MM3 (ref 0.7–3.1)
LYMPHOCYTES NFR BLD AUTO: 24 % (ref 19.6–45.3)
MCH RBC QN AUTO: 30.8 PG (ref 26.6–33)
MCHC RBC AUTO-ENTMCNC: 31.8 G/DL (ref 31.5–35.7)
MCV RBC AUTO: 96.9 FL (ref 79–97)
MONOCYTES # BLD AUTO: 0.56 10*3/MM3 (ref 0.1–0.9)
MONOCYTES NFR BLD AUTO: 12.9 % (ref 5–12)
NEUTROPHILS NFR BLD AUTO: 2.62 10*3/MM3 (ref 1.7–7)
NEUTROPHILS NFR BLD AUTO: 60.3 % (ref 42.7–76)
NRBC BLD AUTO-RTO: 1.2 /100 WBC (ref 0–0.2)
PLATELET # BLD AUTO: 258 10*3/MM3 (ref 140–450)
PMV BLD AUTO: 10.4 FL (ref 6–12)
POTASSIUM SERPL-SCNC: 3.8 MMOL/L (ref 3.5–5.2)
PROT SERPL-MCNC: 6.3 G/DL (ref 6–8.5)
RBC # BLD AUTO: 2.95 10*6/MM3 (ref 3.77–5.28)
SODIUM SERPL-SCNC: 136 MMOL/L (ref 136–145)
WBC NRBC COR # BLD AUTO: 4.34 10*3/MM3 (ref 3.4–10.8)

## 2025-05-23 PROCEDURE — 25010000002 DEXAMETHASONE SODIUM PHOSPHATE 20 MG/5ML SOLUTION: Performed by: INTERNAL MEDICINE

## 2025-05-23 PROCEDURE — 25010000002 FAMOTIDINE 10 MG/ML SOLUTION: Performed by: INTERNAL MEDICINE

## 2025-05-23 PROCEDURE — 25810000003 SODIUM CHLORIDE 0.9 % SOLUTION 250 ML FLEX CONT: Performed by: INTERNAL MEDICINE

## 2025-05-23 PROCEDURE — 96413 CHEMO IV INFUSION 1 HR: CPT

## 2025-05-23 PROCEDURE — 25810000003 SODIUM CHLORIDE 0.9 % SOLUTION

## 2025-05-23 PROCEDURE — 25010000002 HEPARIN LOCK FLUSH PER 10 UNITS

## 2025-05-23 PROCEDURE — 25010000002 DIPHENHYDRAMINE PER 50 MG: Performed by: INTERNAL MEDICINE

## 2025-05-23 PROCEDURE — 85025 COMPLETE CBC W/AUTO DIFF WBC: CPT

## 2025-05-23 PROCEDURE — 25010000002 PACLITAXEL PER 1 MG: Performed by: INTERNAL MEDICINE

## 2025-05-23 PROCEDURE — 80053 COMPREHEN METABOLIC PANEL: CPT

## 2025-05-23 PROCEDURE — 96375 TX/PRO/DX INJ NEW DRUG ADDON: CPT

## 2025-05-23 RX ORDER — SODIUM CHLORIDE 9 MG/ML
20 INJECTION, SOLUTION INTRAVENOUS ONCE
OUTPATIENT
Start: 2025-05-30

## 2025-05-23 RX ORDER — FAMOTIDINE 10 MG/ML
20 INJECTION, SOLUTION INTRAVENOUS AS NEEDED
OUTPATIENT
Start: 2025-05-30

## 2025-05-23 RX ORDER — DIPHENHYDRAMINE HYDROCHLORIDE 50 MG/ML
50 INJECTION, SOLUTION INTRAMUSCULAR; INTRAVENOUS AS NEEDED
OUTPATIENT
Start: 2025-05-30

## 2025-05-23 RX ORDER — HEPARIN SODIUM (PORCINE) LOCK FLUSH IV SOLN 100 UNIT/ML 100 UNIT/ML
500 SOLUTION INTRAVENOUS AS NEEDED
Status: DISCONTINUED | OUTPATIENT
Start: 2025-05-23 | End: 2025-05-23 | Stop reason: HOSPADM

## 2025-05-23 RX ORDER — FAMOTIDINE 10 MG/ML
20 INJECTION, SOLUTION INTRAVENOUS ONCE
OUTPATIENT
Start: 2025-05-30

## 2025-05-23 RX ORDER — HYDROCORTISONE SODIUM SUCCINATE 100 MG/2ML
100 INJECTION INTRAMUSCULAR; INTRAVENOUS AS NEEDED
OUTPATIENT
Start: 2025-05-30

## 2025-05-23 RX ORDER — HEPARIN SODIUM (PORCINE) LOCK FLUSH IV SOLN 100 UNIT/ML 100 UNIT/ML
500 SOLUTION INTRAVENOUS AS NEEDED
OUTPATIENT
Start: 2025-05-23

## 2025-05-23 RX ORDER — SODIUM CHLORIDE 0.9 % (FLUSH) 0.9 %
10 SYRINGE (ML) INJECTION AS NEEDED
OUTPATIENT
Start: 2025-05-23

## 2025-05-23 RX ORDER — SODIUM CHLORIDE 0.9 % (FLUSH) 0.9 %
10 SYRINGE (ML) INJECTION AS NEEDED
Status: DISCONTINUED | OUTPATIENT
Start: 2025-05-23 | End: 2025-05-23 | Stop reason: HOSPADM

## 2025-05-23 RX ORDER — FAMOTIDINE 10 MG/ML
20 INJECTION, SOLUTION INTRAVENOUS ONCE
Status: COMPLETED | OUTPATIENT
Start: 2025-05-23 | End: 2025-05-23

## 2025-05-23 RX ADMIN — FAMOTIDINE 20 MG: 10 INJECTION INTRAVENOUS at 13:54

## 2025-05-23 RX ADMIN — DIPHENHYDRAMINE HYDROCHLORIDE 25 MG: 50 INJECTION, SOLUTION INTRAMUSCULAR; INTRAVENOUS at 13:56

## 2025-05-23 RX ADMIN — DEXAMETHASONE SODIUM PHOSPHATE 12 MG: 4 INJECTION, SOLUTION INTRA-ARTICULAR; INTRALESIONAL; INTRAMUSCULAR; INTRAVENOUS; SOFT TISSUE at 14:13

## 2025-05-23 RX ADMIN — SODIUM CHLORIDE 1000 ML: 9 INJECTION, SOLUTION INTRAVENOUS at 13:54

## 2025-05-23 RX ADMIN — HEPARIN 500 UNITS: 100 SYRINGE at 15:47

## 2025-05-23 RX ADMIN — PACLITAXEL 168 MG: 6 INJECTION, SOLUTION INTRAVENOUS at 14:43

## 2025-05-23 RX ADMIN — Medication 10 ML: at 15:47

## 2025-05-29 DIAGNOSIS — Z17.0 MALIGNANT NEOPLASM OF OVERLAPPING SITES OF LEFT BREAST IN FEMALE, ESTROGEN RECEPTOR POSITIVE: Primary | ICD-10-CM

## 2025-05-29 DIAGNOSIS — C50.812 MALIGNANT NEOPLASM OF OVERLAPPING SITES OF LEFT BREAST IN FEMALE, ESTROGEN RECEPTOR POSITIVE: Primary | ICD-10-CM

## 2025-05-29 RX ORDER — FAMOTIDINE 10 MG/ML
20 INJECTION, SOLUTION INTRAVENOUS ONCE
OUTPATIENT
Start: 2025-06-05

## 2025-05-29 RX ORDER — HYDROCORTISONE SODIUM SUCCINATE 100 MG/2ML
100 INJECTION INTRAMUSCULAR; INTRAVENOUS AS NEEDED
OUTPATIENT
Start: 2025-06-05

## 2025-05-29 RX ORDER — SODIUM CHLORIDE 9 MG/ML
20 INJECTION, SOLUTION INTRAVENOUS ONCE
OUTPATIENT
Start: 2025-06-05

## 2025-05-29 RX ORDER — FAMOTIDINE 10 MG/ML
20 INJECTION, SOLUTION INTRAVENOUS AS NEEDED
OUTPATIENT
Start: 2025-06-05

## 2025-05-29 RX ORDER — AMOXICILLIN 250 MG
1 CAPSULE ORAL 2 TIMES DAILY
Qty: 60 TABLET | Refills: 2 | Status: SHIPPED | OUTPATIENT
Start: 2025-05-29

## 2025-05-29 RX ORDER — DIPHENHYDRAMINE HYDROCHLORIDE 50 MG/ML
50 INJECTION, SOLUTION INTRAMUSCULAR; INTRAVENOUS AS NEEDED
OUTPATIENT
Start: 2025-06-05

## 2025-05-29 NOTE — TELEPHONE ENCOUNTER
RN called patient and spoke with her directly.   Today she is complaining of diarrhea but Lomotil is helping. She would like to cancel her appointments today and resume everything as scheduled next week. She would like medication for the days she does have constipation with treatment, Dr. Tellez recommends taking Senna Colace BID, and Miralax as needed. Patient agreeable and verbalized understanding.

## 2025-05-29 NOTE — TELEPHONE ENCOUNTER
Kenneth called today to cancel her lab,ov and tx because Kristyn is sick. He also stated that every time she has had a tx she is getting very constipated and then it takes a few days and it will break up and she will feel better and then it is time for another tx. He wanted to know if this is normal and if she can have something for the constipation.

## 2025-06-06 RX ORDER — DRONABINOL 2.5 MG/1
2.5 CAPSULE ORAL
Qty: 60 CAPSULE | Refills: 0 | Status: SHIPPED | OUTPATIENT
Start: 2025-06-06 | End: 2025-06-09 | Stop reason: SDUPTHER

## 2025-06-06 NOTE — TELEPHONE ENCOUNTER
Kenneth states Kristyn is not able to walk because she has no energy from her treatments. States they wear her down so much. She does not eat because she is not hungry, but will drink boost. Taking a shower will exhaust her because she does not have the energy to stand, but if she sits in the tub she does not have the energy to get up. Kenneth asks if there is a steroid or something that can boost her energy somehow, and then asks for Dr. Tellez nurse to call him back to discuss if they should continue her treatments.   He requested to cancel Kristyn's appointments for today, 6/6.   His number is [phone].

## 2025-06-09 DIAGNOSIS — C50.812 MALIGNANT NEOPLASM OF OVERLAPPING SITES OF LEFT BREAST IN FEMALE, ESTROGEN RECEPTOR POSITIVE: ICD-10-CM

## 2025-06-09 DIAGNOSIS — Z17.0 MALIGNANT NEOPLASM OF OVERLAPPING SITES OF LEFT BREAST IN FEMALE, ESTROGEN RECEPTOR POSITIVE: ICD-10-CM

## 2025-06-09 RX ORDER — DRONABINOL 2.5 MG/1
2.5 CAPSULE ORAL
Qty: 60 CAPSULE | Refills: 0 | Status: SHIPPED | OUTPATIENT
Start: 2025-06-09

## 2025-06-09 RX ORDER — DRONABINOL 2.5 MG/1
2.5 CAPSULE ORAL
Qty: 60 CAPSULE | Refills: 0 | Status: SHIPPED | OUTPATIENT
Start: 2025-06-09 | End: 2025-06-09 | Stop reason: SDUPTHER

## 2025-06-09 RX ORDER — ONDANSETRON 8 MG/1
8 TABLET, FILM COATED ORAL 3 TIMES DAILY PRN
Qty: 30 TABLET | Refills: 5 | Status: SHIPPED | OUTPATIENT
Start: 2025-06-09

## 2025-06-09 NOTE — TELEPHONE ENCOUNTER
Pharmacy Name:  [facility]    Pharmacy representative name: STEPHANIE    Pharmacy representative phone number: [phone]    What medication are you calling in regards to: MARINOL    What question does the pharmacy have: THE PRESCRIPTION WAS NOT RECEIVED    Who is the provider that prescribed the medication: DR. PEREZ

## 2025-06-09 NOTE — TELEPHONE ENCOUNTER
Caller: ShellKenneth    Relationship: Emergency Contact    Best call back number:   Telephone Information:   Mobile [phone]         Requested Prescriptions:   Requested Prescriptions     Pending Prescriptions Disp Refills    dronabinol (Marinol) 2.5 MG capsule 60 capsule 0     Sig: Take 1 capsule by mouth 2 (Two) Times a Day Before Meals.    ondansetron (ZOFRAN) 8 MG tablet 30 tablet 5     Sig: Take 1 tablet by mouth 3 (Three) Times a Day As Needed for Nausea or Vomiting.        Pharmacy where request should be sent: [facility]      Last office visit with prescribing clinician: 5/8/2025   Last telemedicine visit with prescribing clinician: Visit date not found   Next office visit with prescribing clinician: 6/12/2025     Additional details provided by patient: THE MARINOL IS NOT A RX [facility] CAN GET. REQ IT BE SENT TO [facility]OS[facility] IF THEY CAN FILL IT. IF NOT PLEASE LET PT SON KNOW AND POSSIBLY FIGURE OUT DIFFERENT MEDICATION.     Does the patient have less than a 3 day supply:  [x] Yes  [] No    Would you like a call back once the refill request has been completed: [x] Yes [] No    If the office needs to give you a call back, can they leave a voicemail: [] Yes [x] No

## 2025-06-11 NOTE — PROGRESS NOTES
"NAME: Kristyn Goff    : 1950    DATE: 2025    DIAGNOSIS: Malignant neoplasm of upper-outer quadrant of left breast in female, estrogen receptor positive     TREATMENT HISTORY:   Left mastectomy and SLNBx  with Prophylactic Contralateral Mastectomy performed by Dr. Winnie He 24    2.      3.      Stopped Early for Poor Tolerability      CHIEF COMPLAINT:  Follow Up Breast Cancer     HISTORY OF PRESENT ILLNESS:   Kristyn Goff is a very pleasant 75 y.o. female who is being seen today in the presence of her son and daughter-in-law at the request of Maria Esther He MD for evaluation and treatment of breast cancer.  Ms. Goff says she first noticed a \"knot\" in her left breast about 15 years prior around .  Then it was maybe the size of a marble.  She says it enlarged over time until it was about the size of a lime.  She was seeing her PEP, JOHN Toledo who noticed the lump during an exam and ordered further evaluation.  She had biopsy of two areas in the L breast as well as L axillary LN and this showed invasive moderately differentiated ductal carcinoma with micropapillary features Tumor was >95% 3+ ER+, 81-90% 2+ IN+, HER-2/Estelita - (0).  L axillary LN was involved.  Dr. He took her for L mastectomy w/ SLNBx and prophylactic contralateral mastectomy as below on 24.  Patholgy showed a Stage IIIA multifocal invasive ductal carcinoma of the left breast with micropapillary features. Tumors were 30 mm, 4 mm.  There was associated high grade DCIS.  4/5 LN were involved (3/4 SLN and 1/1 other LN), the largest being 22 mm with extranodal extension.  R breast was without malignancy.    Ms. Goff is recovering reasonably well.  She has healed somewhat slowly and still has L axillary drain in place but she is making steady progress and now says she has minimal output from the axillary drain.  She is to see Dr. He on Wednesday.   She denies weight loss.  No fevers or " "chills. No chest pain or shortness of breath. No abdominal pain, n/v/d/c, no rectal bleeding.  No bony pain. No headaches or visual disturbance.  They do complain that she has developed a \"place\" on her back they are concerned about that they want me to look at today.    Of note, prior to her surgery she had CT imaging as below which showed several small, nonspecific lung nodules in the R lung (5-6 mm).  Of note similar finding was noted on CTPE protocol from 4/30/23).  MRI of the brain also showed a 3 mm enhancing lesion in the L frontal bone which could represent hemangioma although metastatic lesion could not be completely excluded.  Bone scan was clear.        INTERVAL HISTORY:  Ms. Goff presents today for follow up of breast cancer and toxicity check.  She is accompanied today by her son.  The two of them were kind of bickering with one another throughout the appt.  He is worried that she hasn't been sleeping well for the last couple of weeks and is increasingly tired.  He said he is worried because she is progressively weak but won't get up and move.  He says she isn't eating well.  Marinol was ordered for her but they haven't picked it up yet.  We discussed physical therapy.  I recommended we get physical therapy through  but she refused.  She said she would go to the physical therapy location but her son said he can't take her there.  If that is the case, I again recommended PT through .  She again refused.  Her son said he wanted us to give her 2 weeks to improve on her own.  She complains also of numbness and tingling going from her neck to her hands bilaterally, worse on the right.  She has a little tingling in her feet but says this isn't as bad as what she feels in her arms and hands.  I asked if her neck bothers her and she says that she does have some neck pain. She has had difficulty with activities such as writing a check.      PAST MEDICAL HISTORY:  Past Medical History:   Diagnosis Date    " Breast cancer     Elevated cholesterol     GERD (gastroesophageal reflux disease)     High cholesterol    -  H/o Diverticulosis  -  H/o Hyperparathyroidism    Risk Factors:  Age of Menarche: 9 yo  Age of Menopause: around age 54  Prior Breast Disease: Denies prior bx but says this lesion was present for maybe 15 yrs before she sought attention.  Pregnancies:    Age of 1st pregnancy:23  Family History of Breast Cancer: denies  Family History of Ovarian Cancer:Denies  History of HRT use:  Denies       PAST SURGICAL HISTORY:  Past Surgical History:   Procedure Laterality Date    CATARACT EXTRACTION Right     COLONOSCOPY      GALLBLADDER SURGERY      MASTECTOMY Bilateral     PARATHYROIDECTOMY      VENOUS ACCESS DEVICE (PORT) INSERTION Right 2025    Procedure: INSERTION VENOUS ACCESS DEVICE;  Surgeon: Karen Johnston MD;  [facility];  Service: General;  Laterality: Right;   -  Parathyrodectomy  -  S/p mario cataract surgery w/ implant placement  -  CCU    FAMILY HISTORY:  Family History   Problem Relation Age of Onset    Heart attack Father     Hypertension Sister     Heart attack Sister     Diabetes Sister    -  Denies family h/o malignancy of any kind    SOCIAL HISTORY:  Social History     Socioeconomic History    Marital status:    Tobacco Use    Smoking status: Former     Types: Cigarettes    Smokeless tobacco: Never   Vaping Use    Vaping status: Never Used   Substance and Sexual Activity    Alcohol use: Never    Drug use: Never    Sexual activity: Defer   -  .  Lives alone.  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  Former smoker, quit around age 33 yo      REVIEW OF SYSTEMS:   A comprehensive 14 point review of systems was performed.  Significant findings as mentioned above.  All other systems reviewed and are negative.      MEDICATIONS:  The current medication list was reviewed in the EMR    Current Outpatient Medications:     atorvastatin (LIPITOR) 10 MG tablet, Take 2  tablets by mouth Daily., Disp: , Rfl:     Carboxymethylcellulose Sodium (EYE DROPS OP), Apply 1 drop to eye(s) as directed by provider 2 (Two) Times a Day., Disp: , Rfl:     diphenoxylate-atropine (LOMOTIL) 2.5-0.025 MG per tablet, Take 1 tablet by mouth 4 (Four) Times a Day As Needed for Diarrhea., Disp: 120 tablet, Rfl: 0    dronabinol (Marinol) 2.5 MG capsule, Take 1 capsule by mouth 2 (Two) Times a Day Before Meals., Disp: 60 capsule, Rfl: 0    lidocaine-prilocaine (EMLA) 2.5-2.5 % cream, Apply to port-a-cath site 30 minutes prior to arrival at infusion center. Cover with plastic wrap., Disp: 30 g, Rfl: 1    loratadine (CLARITIN) 10 MG tablet, Take 1 tablet by mouth daily on the day following chemotherapy prior to growth factor injection and continue for 7 days., Disp: 7 tablet, Rfl: 5    ondansetron (ZOFRAN) 8 MG tablet, Take 1 tablet by mouth 3 (Three) Times a Day As Needed for Nausea or Vomiting., Disp: 30 tablet, Rfl: 5    prochlorperazine (COMPAZINE) 10 MG tablet, Take 1 tablet by mouth Every 6 (Six) Hours As Needed for Nausea or Vomiting., Disp: 30 tablet, Rfl: 1    sennosides-docusate (PERICOLACE) 8.6-50 MG per tablet, Take 1 tablet by mouth 2 (Two) Times a Day. Do not take if experiencing diarrhea., Disp: 60 tablet, Rfl: 2    vitamin D (ERGOCALCIFEROL) 1.25 MG (26706 UT) capsule capsule, Take 1 capsule by mouth 1 (One) Time Per Week., Disp: 4 capsule, Rfl: 5    gabapentin (NEURONTIN) 100 MG capsule, Take 1 capsule by mouth 3 (Three) Times a Day. 1 week supply, Disp: 21 capsule, Rfl: 0    naloxone (NARCAN) 4 MG/0.1ML nasal spray, Call 911. Don't prime. [place] in 1 nostril for overdose. Repeat in 2-3 minutes in other nostril if no or minimal breathing/responsiveness., Disp: 1 each, Rfl: 0    nortriptyline (PAMELOR) 10 MG capsule, Take 1 capsule by mouth Every Night., Disp: 30 capsule, Rfl: 3    OLANZapine (zyPREXA) 5 MG tablet, Take 1 tablet by mouth for 4 doses starting night of chemotherapy., Disp: 8  tablet, Rfl: 1    oxyCODONE-acetaminophen (PERCOCET) 5-325 MG per tablet, Take 1 tablet by mouth Every 4 (Four) Hours As Needed for Moderate Pain. 1 week supply., Disp: 42 tablet, Rfl: 0    vitamin B-6 (PYRIDOXINE) 100 MG tablet, Take 1 tablet by mouth Daily., Disp: 30 tablet, Rfl: 3    ALLERGIES:  No Known Allergies    PHYSICAL EXAM:  Vitals:    06/12/25 0852   BP: 131/65   Pulse: 107   Resp: 20   Temp: 96.9 °F (36.1 °C)   TempSrc: Temporal   SpO2: 100%   Weight: 95.2 kg (209 lb 12.8 oz)   PainSc: 0-No pain       Body surface area is 2.09 meters squared.   Body mass index is 31.9 kg/m².   ECOG score: 0     General:  Awake, alert and oriented, in no distress.  Appears well.  HEENT:  Pupils are equal, round and reactive to light and accommodation, Extra-ocular movements full, Oropharyx clear, mucous membranes moist  Neck:  No JVD, thyromegaly or lymphadenopathy  CV:  Regular rate and rhythm, no murmurs, rubs or gallops  Resp:  Lungs are clear to auscultation bilaterally, no wheezing.  PAC in R chest c/d/i  Breast:  S/p mario mastectomies.  Surgical incisions are smooth and intact without nodularity. No axillary LAD on either side.   Abd:  Soft, non-tender, non-distended, bowel sounds present, no palpable organomegaly or masses  Ext:  No clubbing, cyanosis, no lower extremity edema  Lymph:  No cervical, supraclavicular, axillary, inguinal or femoral adenopathy  Neuro:  MS as above, CN II-XII intact, grossly non-focal exam      PATHOLOGY:  8/28/24 11/4/24            ENDOSCOPY:        IMAGING:  Mammo Diagnostic Digital Tomosynthesis Bilateral With CAD (07/19/2024 10:42) & US Breast Left Limited (07/19/2024 11:35)   FINDINGS:    Irregular hypodense mass upper outer left breast, anterior depth, 1-2 o'clock radian, with pleomorphic calcifications, measures 3.9 x 2.4 cm (image 40 MLO, image 37 CC).      Irregular lesion with coarse calcification is seen at left 2-3 o'clock radian, posterior depth measuring 1.7 x  1.5 cm (image 26 MLO, image 23 CC).      Enlarged left axillary lymph nodes are seen.      Benign secretory calcifications are seen bilaterally.      No additional suspicious masses, tissue distortion, or suspicious calcifications are identified.      Ultrasound:      Real time, targeted, technologist performed sonographic imaging of the left breast was performed utilizing a multihertz transducer.       Ultrasound was performed in the left upper outer quadrant and axilla.      --- Lobulated mass is seen at left 11-2 o'clock radian, 4 cm from the nipple, central vascular flow, 5.0 x 2.9 x 3.4 cm. This corresponds to the mammogram.    --- Spiculated lesion at 2-3 o'clock radian, 9 cm from the nipple, measuring 1.7 x 1.0 x 1.1 cm. This corresponds to the distortion seen on mammogram.      Enlarging nodes are seen in the left axilla. The largest node measures 2.0 x 2.5 x 1.3 cm.     IMPRESSION:  1.   No mammographic evidence of malignancy is are seen in the right breast.        2.   Breast masses at left 11-3 o'clock radian. These are highly suspicious for malignant process.    3. Enlarging nodes in the left axilla which are suspicious for metastatic disease.         RECOMMENDED FOLLOWUP: Ultrasound guided biopsy of the large mass at 11-2 o'clock radian, left 2-3 o'clock radian and one of the left axillary lymph nodes.      BI-RADS category 5: Highly suggestive of malignancy.       MRI Abdomen With & Without Contrast (08/14/2024 14:05)   FINDINGS:      LOWER CHEST: Lung bases are clear. Small hiatal hernia.      LIVER: Normal contours. No mass. Vascular shunt in the dome (for example on bolus phase series, image 119). No steatosis. No intrahepatic biliary dilatation. Portal and hepatic veins are patent.      GALLBLADDER: Removed.      COMMON BILE DUCT: Normal caliber. No stones.       SPLEEN: Within normal limits.      PANCREAS: No mass. No pancreatic fluid collections. The pancreatic duct is normal.      ADRENALS: No  masses.      KIDNEYS: No solid left renal lesion identified, to correspond to the abnormality described on recent ultrasound. A parapelvic cyst in the left kidney measuring 2 cm. A couple of small cortical cysts in the right kidney measuring up to 1.3 cm. No hydronephrosis.      LYMPH NODES: No adenopathy.      STOMACH, SMALL BOWEL AND COLON: No bowel wall thickening or obstruction.      PERITONEAL CAVITY: No mesenteric stranding or free fluid.      ABDOMINAL AORTA: No aneurysm.      SOFT TISSUES: Normal.      OSSEOUS STRUCTURES: No acute fracture or destructive lesion.     IMPRESSION:  1.   No solid left renal lesion identified, to correspond to the abnormality described on recent ultrasound. Few bilateral renal cysts, including a parapelvic cyst in the left kidney measuring 2 cm.   2.   Additional noncontributory findings described above.     Mammo Diagnostic Digital Tomosynthesis Left With CAD (08/28/2024 09:46) & US Breast Left Limited (08/28/2024 11:11)   FINDINGS:   Findings: There are multiple suspicious findings in the left breast as listed below:     Finding 1: There is a irregular mass with associated calcifications abutting the skin, measuring mammographically 45 x 42 x 30 mm. Targeted left breast ultrasound at the 1:00 position, 4 cm from the revealed a corresponding sonographic irregular mass with calcifications measuring sonographically 4.6 x 2.7 x 3 cm. This extends to the skin. Finding is hard Elastography. Finding is highly suspicious.     Finding 2: There is a irregular mass is associated architectural distortion and coarse central calcification as well as fine: calcifications in the approximate 2:00 position of the left breast middle to posterior depth, 13 cm from the nipple. Targeted left breast ultrasound the 2:00 position 13 cm from the reveals an irregular 8 x 6 x 6 mm mass, with adjacent vascularity, intermediate Elastography.   Finding is highly suspicious.     Finding 3: In the 12:00  position of the left breast there is a 45 mm span of fine pleomorphic calcifications with linear/segmental distribution with associated subtle asymmetry best appreciated in the magnified views, full field CC, slice 40 of 79 as well as ML slice 47/91. These are located approximately 8 cm from nipple. Targeted right breast ultrasound in the left breast at the 12:00 position 8 cm from the nipple identifies an irregular mass, soft on Elastography, measuring 14 x 11 x 12 mm, which appears underestimated sonographically compared to mammographic span of 45 mm linear calcifications. Finding is highly suspicious     Finding 4: There are additional small highly suspicious asymmetries with microcalcifications scattered throughout the inferior left breast and lower inner quadrant in the approximate 6:00-9:00 position middle to posterior depth: The total span of these lower inner quadrant asymmetries measures up to 10 x 7 x 6 cm as best appreciated on CC full-field, slice 13 of 79 and ML slice 27 out of 92. Findings are highly suspicious indicating multicentric disease.     Finding 5: There are  2 abnormal lymph nodes in the left axilla corresponding to completely replaced lymph nodes labeled as lymph node1 and 2 on US; these are highly suspicious with no demonstration of fatty hilum. This lymph nodes measure respectively 22 mm and 18 mm each, adjacent to each other.       Right breast: A repeat right diagnostic mammogram was not performed as review of outside right breast mammogram did not reveal any suspicious areas of concern.     IMPRESSION:  BI-RADS Code: BI-RADS 5, Highly suggestive of malignancy.   Multicentric left breast disease with highly suspicious at least 2 left axillary lymph nodes     RECOMMENDATIONS:   Left Breast Recommendations: Ultrasound Guided Breast Biopsy for findings 1 and 2 to confirm multicentric disease.     Fine Needle Aspiration     Right diagnostic mammogram in one year.     US Axilla Left  (09/12/2024 12:02)   FINDINGS:   Final mammographic images demonstrate the localizer tag to be located within the left axillary lymph node and adjacent to the twirl clip.     Left Breast Density: The breast tissue is heterogeneously dense, which may obscure small masses.     IMPRESSION:  Successful localization of the left axillary lymph node with a pink smartclip.     MR Head w and wo IV Contrast (09/25/2024 08:32)   Findings:     No enhancing lesions to suggest intracranial metastatic disease.     No evidence of restricted diffusion to suggest acute territorial infarct.     No evidence of mass effect, midline shift, ration, hydrocephalus.     No acute appearing intracranial hemorrhage.  No extra-axial fluid collections.  Scattered periventricular and subcortical T2/FLAIR hyperintense foci within the supratentorial brain, probably related to small vessel ischemic changes.  Age-appropriate ventricular size and configuration.     Basal cisterns are patent.  Major intracranial flow voids are preserved.     Paranasal sinuses  are clear.  Mastoid cells are clear.  Postsurgical changes of the orbits.     3 mm enhancing lesion within the left frontal bone could be related to hemangioma (series 6 image 20, series 13 image 20), although underlying metastatic disease cannot be totally excluded.  Attention to this area on follow-up imaging.     No additional calvarial lesions.     Impression:  No abnormal intraparenchymal postcontrast enhancement to suggest intraparenchymal metastatic disease.      No acute intracranial process.  Sequelae of small vessel ischemic changes.     3 mm enhancing lesion within the left frontal bone could be related to hemangioma (series 6 image 20, series 13 image 20), although underlying metastatic disease cannot be totally excluded.  Attention to this area on follow-up imaging.     CT Abdomen Pelvis With Contrast (09/25/2024 12:40)   FINDINGS:   Mediastinum and Pleura: No mediastinal or hilar  adenopathy. No pleural or pericardial effusion.     Lungs: 5 mm peripheral right upper lobe nodule, image 49 of series 3. Subpleural 6 mm peripheral right middle lobe nodule, image 58 series 3. Subpleural 6 mm right basal nodule, image 73 of series 3. No acute airspace disease. The central airways are unremarkable.     Abdomen and Pelvis: No focal hepatic lesions. Gallbladder is removed. No biliary dilatation. No splenic lesions. Normal adrenal glands. No pancreatic lesions or peripancreatic stranding. Exophytic right renal cortical cysts. No suspicious renal parenchymal lesions. No renal collecting system dilatation.     Calcifications involving abdominal aorta, bilateral iliac vessels and SMA, with associated moderate to significant stenosis of the distal SMA trunk.     No enlarged lymph nodes. No mesenteric or retroperitoneal nodularity. No free fluid or air within the abdomen or pelvis.     Unremarkable stomach. Normal caliber of small and large bowel. Distal colonic diverticulosis without evidence of complications.     Urinary bladder is within normal limits. No abnormal soft tissue densities in the regions of adnexa.     Musculoskeletal: Right breast is only partially included, without discrete suspicious lesions noted. There is a biopsied lesion within left breast, image 34 of series 2, measures up to 2.1 cm. There is also a left axillary lymphadenopathy, status post biopsy, with example of left axillary lymph node on image 37 of series 2 measuring up to 1.7 cm.     No aggressive osseous lesions or acute fractures.     IMPRESSION:  Left breast lesion and left axillary lymphadenopathy, representing known breast cancer with metastasis.   Several peripheral right lung nodules, nonspecific.   No enlarged mediastinal lymph nodes.   No suspicious lesions within the abdomen or pelvis.   No suspicious osseous lesions are identified.     CT Chest With Contrast Diagnostic (09/25/2024 12:40)   FINDINGS:   Mediastinum  and Pleura: No mediastinal or hilar adenopathy. No pleural or pericardial effusion.     Lungs: 5 mm peripheral right upper lobe nodule, image 49 of series 3. Subpleural 6 mm peripheral right middle lobe nodule, image 58 series 3. Subpleural 6 mm right basal nodule, image 73 of series 3. No acute airspace disease. The central airways are unremarkable.     Abdomen and Pelvis: No focal hepatic lesions. Gallbladder is removed. No biliary dilatation. No splenic lesions. Normal adrenal glands. No pancreatic lesions or peripancreatic stranding. Exophytic right renal cortical cysts. No suspicious renal parenchymal lesions. No renal collecting system dilatation.     Calcifications involving abdominal aorta, bilateral iliac vessels and SMA, with associated moderate to significant stenosis of the distal SMA trunk.     No enlarged lymph nodes. No mesenteric or retroperitoneal nodularity. No free fluid or air within the abdomen or pelvis.     Unremarkable stomach. Normal caliber of small and large bowel. Distal colonic diverticulosis without evidence of complications.     Urinary bladder is within normal limits. No abnormal soft tissue densities in the regions of adnexa.     Musculoskeletal: Right breast is only partially included, without discrete suspicious lesions noted. There is a biopsied lesion within left breast, image 34 of series 2, measures up to 2.1 cm. There is also a left axillary lymphadenopathy, status post biopsy, with example of left axillary lymph node on image 37 of series 2 measuring up to 1.7 cm.     No aggressive osseous lesions or acute fractures.     IMPRESSION:  Left breast lesion and left axillary lymphadenopathy, representing known breast cancer with metastasis.   Several peripheral right lung nodules, nonspecific.   No enlarged mediastinal lymph nodes.   No suspicious lesions within the abdomen or pelvis.   No suspicious osseous lesions are identified.     NM Bone Scan Whole Body (09/25/2024 13:41)    FINDINGS:   Bones: No sites of focal increased (hot) and decreased (cold) uptake in axial and appendicular skeleton to suggest bone metastasis.     Bones/Joints: Sites of mild uptake consistent with benign arthritic, degenerative or post traumatic changes.     Soft-tissues: Asymmetric mild uptake in the left breast soft tissues relative to the right. Two kidneys visualized.     IMPRESSION:  No evidence of osseous metastatic disease.     Adult Transthoracic Echo Complete W/ Cont if Necessary Per Protocol (01/03/2025 09:52)   Interpretation Summary    Left ventricular systolic function is normal. Estimated left ventricular EF = 55%    Left ventricular wall thickness is consistent with moderate concentric hypertrophy.    Left ventricular diastolic function is consistent with (grade I) impaired relaxation.    Estimated right ventricular systolic pressure from tricuspid regurgitation is normal (<35 mmHg).    RECENT LABS:  Lab Results   Component Value Date    WBC 6.82 06/12/2025    HGB 9.5 (L) 06/12/2025    HCT 31.8 (L) 06/12/2025    MCV 97.2 (H) 06/12/2025    RDW 17.9 (H) 06/12/2025     06/12/2025    NEUTRORELPCT 75.2 06/12/2025    LYMPHORELPCT 11.0 (L) 06/12/2025    MONORELPCT 9.7 06/12/2025    EOSRELPCT 3.4 06/12/2025    BASORELPCT 0.3 06/12/2025    NEUTROABS 5.13 06/12/2025    LYMPHSABS 0.75 06/12/2025       Lab Results   Component Value Date     06/12/2025    K 4.3 06/12/2025    CO2 24.7 06/12/2025     06/12/2025    BUN 19.4 06/12/2025    CREATININE 0.69 06/12/2025    GLUCOSE 113 (H) 06/12/2025    CALCIUM 8.9 06/12/2025    ALKPHOS 139 (H) 06/12/2025    AST 25 06/12/2025    ALT 16 06/12/2025    BILITOT 0.3 06/12/2025    ALBUMIN 3.3 (L) 06/12/2025    PROTEINTOT 6.1 06/12/2025    MG 1.6 04/25/2025     Lab Results   Component Value Date    FERRITIN 384.20 (H) 02/28/2025    IRON 92 02/28/2025    TIBC 352 02/28/2025    LABIRON 26 02/28/2025    [id number] 335 11/25/2024    FOLATE 7.32 11/25/2024      Lab Results   Component Value Date    TSH 2.030 06/12/2025     Lab Results   Component Value Date    GIWA19ZM 40.2 06/12/2025     Lab Results   Component Value Date    LABCA2 44.6 (H) 11/25/2024      Lab Results   Component Value Date     37.7 (H) 11/25/2024       ASSESSMENT & PLAN:  Kristyn Goff is a very pleasant 75 y.o. female with Stage IIIA (pT2(m)N2aMX moderately differentiated, multifocal invasive ductal carcinoma with micropapillary features.  Tumors were 30 mm and 4 mm in diameter.  There was associated high grade DCIS.  Surgical margins were clear.  She had involvement of 3/4 SLN and 1/1 other LN.  Largest LN metastasis was 22 mm with extranodal extension.  Tumor was >95% 3+ ER+, 81-90% 2+ CA+, HER-2/Estelita - (0).    1.  Left Breast Cancer:  - Patient and family report that the mass was present for about 15 years before she had it evaluated.  - She is s/p successful L mastectomy w/ clear margins and prophylactic contralateral mastectomy performed by Dr. Winnie He 11/4/24.  She healed well from her surgery.  - Baseline echocardiogram performed 1/3/2025 showed low normal LVEF 55%.  - We recommended adjuvant chemotherapy to consist of DDAC x 4 with growth factor support followed by weekly Taxol x 12. We discussed potential risks benefits and side effects and she decided to proceed.  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  - Following adjuvant radiation she will require hormonal therapy.  Will plan to get DEXA closer to that time.  - She received her first cycle of dose dense AC chemotherapy on 1/16/2025.  Unfortunately shortly thereafter she came down with norovirus infection with prolific diarrhea. This thankfully resolved. She completed DDAC with only and intermittent nausea controlled with antiemetics.  -  She then started low dose weekly Taxol therapy and has completed 7/12 planned cycles.  Unfortunately, she is  progressively fatigued, generally weak.  Given this, I have recommended we stop therapy early.    -  Will refer to radiation oncology.    2.  Generalized weakness:  -  I strongly recommended physical therapy to help with strengthening and balance.  She refused to participate in PT with home health.  Her son says he cannot transport her to physical therapy at a physical therapy location other than her home.  They argued back and forth a bit about this during her appt but ultimately he asked me to give her 2 weeks to see if she would improve on her own.    3.  Poor appetite:  -  Her son called about this and Marinol 2.5 mg BID was called in for her. They have not yet picked this up but plan to do so today.    4.  Difficulty sleeping:  -  Will give trial of Nortriptyline 10 mg QHS.    5.  Numbness and tingling from her neck into her mario arms R>L.  She has a little bit of tingling in her feet.  While the latter could be related to peripheral neuropathy from taxol, but former seems more likely to have an alternate cause.  Will check MRI C-spine to further evaluate.    6.  Abnormal imaging findings:  -  She had CT CAP performed 9/25/24 which showed several tiny, nonspecific lung nodules in the R lung measuring 5-6 mm.  These were previously described on CTPE imaging 4/30/23.  Will need f/u CT imaging after an interval.  -  She also had 3 mm enhancing lesion in the L frontal bone noted on Brain MRI 9/25/24.  Could be c/w hemangioma.  Could not completely r/o metastatic lesion.  Bone scan 9/25/24 was negative.     -  Will order repeat imaging to include CT CAP with contrast, bone scan, MRI Brain.  Will also check MRI C-spine given numbness/tingling in mario arms/hands with neck pain.    5. Normocytic anemia:  - Likely related to chemotherapy. Has been nutritionally replete. Should improve off of chemotherapy.    6. Generalized weakness/fatigue  -  Improved with weekly Taxol.  Will monitor.  - She does report weakness/fatigue  today, most likely related to current symptoms of diarrhea/poor appetite.  1 L of IV fluids given today for supportive care.  Encouraged good nutrition and oral hydration at home.      7.  Prophylaxis:  -  Kristyn Goff did not receive 2024 flu vaccine.  This was recommended but declined.  -  Kristyn JONES Samples did not receive Prevnar vaccine.  This was recommended but declined.  -  Kristyn JONES Samples did not receive COVID vaccine.  This was recommended but declined.    8. ACO / DANK/Other  Quality measures  -  Kristyn Goff did not receive 2024 flu vaccine.  This was recommended but declined.  -  Kristyn Goff reports a pain score of 0.    -  Current outpatient and discharge medications have been reconciled for the patient.  Reviewed by: Kaylee Tellez MD      9. Follow Up:  -  Stop chemotherapy early  -  Strongly recommended physical therapy but she refuses physical therapy through home health and her son refuses to transport her to physical therapy at any other location.  After a long discussion back and forth, they decided they wanted to wait 2 weeks and see if she would improve on her own.  -  She will start the Marinol that was called in on 6/9/25.    -  Check CT CAP, bone scan and MRI brain to f/u imaging abnormalities  -  Check MRI C-spine to evaluate neck pain with mario arm numbness/tingling.  -  RTC 6/26/25.  Will get lab at that time to include CBCD, CMP, Ferritin, iron profile, B12, Folate, , CA27-29.         I spent 40 minutes with Kristyn Goff today.  In the office today, more than 50% of this time was spent face-to-face with her  in counseling / coordination of care, reviewing her interim medical history and counseling on the current treatment plan.  All questions were answered to her satisfaction.           Electronically Signed by: Kaylee Tellez MD          CC:     JOHN Toledo DO Veronica Morgan Jones, MD

## 2025-06-12 ENCOUNTER — LAB (OUTPATIENT)
Dept: ONCOLOGY | Facility: CLINIC | Age: 75
End: 2025-06-12
Payer: MEDICARE

## 2025-06-12 ENCOUNTER — APPOINTMENT (OUTPATIENT)
Dept: ONCOLOGY | Facility: HOSPITAL | Age: 75
End: 2025-06-12
Payer: MEDICARE

## 2025-06-12 ENCOUNTER — OFFICE VISIT (OUTPATIENT)
Dept: ONCOLOGY | Facility: CLINIC | Age: 75
End: 2025-06-12
Payer: MEDICARE

## 2025-06-12 VITALS
HEART RATE: 107 BPM | RESPIRATION RATE: 20 BRPM | OXYGEN SATURATION: 100 % | DIASTOLIC BLOOD PRESSURE: 65 MMHG | BODY MASS INDEX: 31.9 KG/M2 | TEMPERATURE: 96.9 F | WEIGHT: 209.8 LBS | SYSTOLIC BLOOD PRESSURE: 131 MMHG

## 2025-06-12 DIAGNOSIS — R53.1 GENERALIZED WEAKNESS: ICD-10-CM

## 2025-06-12 DIAGNOSIS — E55.9 VITAMIN D DEFICIENCY: ICD-10-CM

## 2025-06-12 DIAGNOSIS — R20.0 NUMBNESS AND TINGLING IN LEFT ARM: ICD-10-CM

## 2025-06-12 DIAGNOSIS — R20.0 NUMBNESS AND TINGLING OF RIGHT ARM: ICD-10-CM

## 2025-06-12 DIAGNOSIS — Z17.0 MALIGNANT NEOPLASM OF OVERLAPPING SITES OF LEFT BREAST IN FEMALE, ESTROGEN RECEPTOR POSITIVE: Primary | ICD-10-CM

## 2025-06-12 DIAGNOSIS — D64.9 NORMOCYTIC ANEMIA: ICD-10-CM

## 2025-06-12 DIAGNOSIS — R20.2 NUMBNESS AND TINGLING OF RIGHT ARM: ICD-10-CM

## 2025-06-12 DIAGNOSIS — C50.812 MALIGNANT NEOPLASM OF OVERLAPPING SITES OF LEFT BREAST IN FEMALE, ESTROGEN RECEPTOR POSITIVE: Primary | ICD-10-CM

## 2025-06-12 DIAGNOSIS — R20.2 NUMBNESS AND TINGLING IN LEFT ARM: ICD-10-CM

## 2025-06-12 DIAGNOSIS — R53.83 OTHER FATIGUE: ICD-10-CM

## 2025-06-12 DIAGNOSIS — Z17.0 MALIGNANT NEOPLASM OF OVERLAPPING SITES OF LEFT BREAST IN FEMALE, ESTROGEN RECEPTOR POSITIVE: ICD-10-CM

## 2025-06-12 DIAGNOSIS — C50.812 MALIGNANT NEOPLASM OF OVERLAPPING SITES OF LEFT BREAST IN FEMALE, ESTROGEN RECEPTOR POSITIVE: ICD-10-CM

## 2025-06-12 DIAGNOSIS — R63.0 POOR APPETITE: ICD-10-CM

## 2025-06-12 LAB
25(OH)D3 SERPL-MCNC: 40.2 NG/ML (ref 30–100)
ALBUMIN SERPL-MCNC: 3.3 G/DL (ref 3.5–5.2)
ALBUMIN/GLOB SERPL: 1.2 G/DL
ALP SERPL-CCNC: 139 U/L (ref 39–117)
ALT SERPL W P-5'-P-CCNC: 16 U/L (ref 1–33)
ANION GAP SERPL CALCULATED.3IONS-SCNC: 12.3 MMOL/L (ref 5–15)
AST SERPL-CCNC: 25 U/L (ref 1–32)
BASOPHILS # BLD AUTO: 0.02 10*3/MM3 (ref 0–0.2)
BASOPHILS NFR BLD AUTO: 0.3 % (ref 0–1.5)
BILIRUB SERPL-MCNC: 0.3 MG/DL (ref 0–1.2)
BUN SERPL-MCNC: 19.4 MG/DL (ref 8–23)
BUN/CREAT SERPL: 28.1 (ref 7–25)
CALCIUM SPEC-SCNC: 8.9 MG/DL (ref 8.6–10.5)
CHLORIDE SERPL-SCNC: 101 MMOL/L (ref 98–107)
CO2 SERPL-SCNC: 24.7 MMOL/L (ref 22–29)
CREAT SERPL-MCNC: 0.69 MG/DL (ref 0.57–1)
DEPRECATED RDW RBC AUTO: 62.6 FL (ref 37–54)
EGFRCR SERPLBLD CKD-EPI 2021: 90.1 ML/MIN/1.73
EOSINOPHIL # BLD AUTO: 0.23 10*3/MM3 (ref 0–0.4)
EOSINOPHIL NFR BLD AUTO: 3.4 % (ref 0.3–6.2)
ERYTHROCYTE [DISTWIDTH] IN BLOOD BY AUTOMATED COUNT: 17.9 % (ref 12.3–15.4)
GLOBULIN UR ELPH-MCNC: 2.8 GM/DL
GLUCOSE SERPL-MCNC: 113 MG/DL (ref 65–99)
HCT VFR BLD AUTO: 31.8 % (ref 34–46.6)
HGB BLD-MCNC: 9.5 G/DL (ref 12–15.9)
IMM GRANULOCYTES # BLD AUTO: 0.03 10*3/MM3 (ref 0–0.05)
IMM GRANULOCYTES NFR BLD AUTO: 0.4 % (ref 0–0.5)
LYMPHOCYTES # BLD AUTO: 0.75 10*3/MM3 (ref 0.7–3.1)
LYMPHOCYTES NFR BLD AUTO: 11 % (ref 19.6–45.3)
MCH RBC QN AUTO: 29.1 PG (ref 26.6–33)
MCHC RBC AUTO-ENTMCNC: 29.9 G/DL (ref 31.5–35.7)
MCV RBC AUTO: 97.2 FL (ref 79–97)
MONOCYTES # BLD AUTO: 0.66 10*3/MM3 (ref 0.1–0.9)
MONOCYTES NFR BLD AUTO: 9.7 % (ref 5–12)
NEUTROPHILS NFR BLD AUTO: 5.13 10*3/MM3 (ref 1.7–7)
NEUTROPHILS NFR BLD AUTO: 75.2 % (ref 42.7–76)
NRBC BLD AUTO-RTO: 0 /100 WBC (ref 0–0.2)
PLATELET # BLD AUTO: 254 10*3/MM3 (ref 140–450)
PMV BLD AUTO: 9.5 FL (ref 6–12)
POTASSIUM SERPL-SCNC: 4.3 MMOL/L (ref 3.5–5.2)
PROT SERPL-MCNC: 6.1 G/DL (ref 6–8.5)
RBC # BLD AUTO: 3.27 10*6/MM3 (ref 3.77–5.28)
SODIUM SERPL-SCNC: 138 MMOL/L (ref 136–145)
TSH SERPL DL<=0.05 MIU/L-ACNC: 2.03 UIU/ML (ref 0.27–4.2)
WBC NRBC COR # BLD AUTO: 6.82 10*3/MM3 (ref 3.4–10.8)

## 2025-06-12 PROCEDURE — 85025 COMPLETE CBC W/AUTO DIFF WBC: CPT | Performed by: INTERNAL MEDICINE

## 2025-06-12 PROCEDURE — 84443 ASSAY THYROID STIM HORMONE: CPT | Performed by: INTERNAL MEDICINE

## 2025-06-12 PROCEDURE — 82306 VITAMIN D 25 HYDROXY: CPT | Performed by: INTERNAL MEDICINE

## 2025-06-12 PROCEDURE — 80053 COMPREHEN METABOLIC PANEL: CPT | Performed by: INTERNAL MEDICINE

## 2025-06-12 RX ORDER — MULTIVITAMIN WITH IRON
100 TABLET ORAL DAILY
Qty: 30 TABLET | Refills: 3 | Status: SHIPPED | OUTPATIENT
Start: 2025-06-12

## 2025-06-12 RX ORDER — NORTRIPTYLINE HYDROCHLORIDE 10 MG/1
10 CAPSULE ORAL NIGHTLY
Qty: 30 CAPSULE | Refills: 3 | Status: SHIPPED | OUTPATIENT
Start: 2025-06-12

## 2025-06-12 NOTE — PROGRESS NOTES
Venipuncture Blood Specimen Collection  Venipuncture performed in right arm by Marta Bazan MA with good hemostasis. Patient tolerated the procedure well without complications.   06/12/25   Marta Bazan MA

## 2025-06-16 ENCOUNTER — PATIENT OUTREACH (OUTPATIENT)
Dept: ONCOLOGY | Facility: CLINIC | Age: 75
End: 2025-06-16
Payer: MEDICARE

## 2025-06-16 DIAGNOSIS — G89.3 CHRONIC PAIN DUE TO MALIGNANT NEOPLASTIC DISEASE: Primary | ICD-10-CM

## 2025-06-16 RX ORDER — OXYCODONE AND ACETAMINOPHEN 5; 325 MG/1; MG/1
1 TABLET ORAL EVERY 4 HOURS PRN
Qty: 42 TABLET | Refills: 0 | Status: SHIPPED | OUTPATIENT
Start: 2025-06-16

## 2025-06-16 RX ORDER — GABAPENTIN 100 MG/1
100 CAPSULE ORAL 3 TIMES DAILY
Qty: 21 CAPSULE | Refills: 0 | Status: SHIPPED | OUTPATIENT
Start: 2025-06-16

## 2025-06-16 NOTE — TELEPHONE ENCOUNTER
RN spoke with patient son Kenneth. He states that the patient has been in excruciating pain for over 1 month. RN inquired if this was brought up in the office visit with Dr. Tellez last week, Kenneth states that this issue was discussed. RN advised that scans have been ordered to evaluate the patient. Kenneth advised that he has had to take the patient to the ED twice since last Thursday and is currently there with the patient. He states that she is in so much pain that they are unable to do anything. RN discussed concerns with Dr. Ariza on call. 1 week supply of pain medication sent to patient pharmacy, Kenneth is agreeable to this plan and will contact our office sooner if the medication does not help the patients pain. RN contacted [facility] ED physician to make them aware of this plan, Dr. Fernandes states that the patient will be discharged from their ED.

## 2025-06-16 NOTE — TELEPHONE ENCOUNTER
Kenneth says Kristyn is in the ER in [place] for the second time since Friday because she is in extreme pain and needs a pain pill prescribed to her for it. She has been in pain for about a month - her upper back, shoulders, neck, arms, hands, and feet are hurting her. She is not sleeping or eating appropriately. Is only getting an hour of sleep per day bc of exhaustion.   Kenneth would like to speak with Dr. Tellez nurse about Kristyn. Call back number is [phone].

## 2025-06-17 DIAGNOSIS — G89.3 CHRONIC PAIN DUE TO MALIGNANT NEOPLASTIC DISEASE: ICD-10-CM

## 2025-06-17 DIAGNOSIS — R53.81 DEBILITY: ICD-10-CM

## 2025-06-17 DIAGNOSIS — C50.812 MALIGNANT NEOPLASM OF OVERLAPPING SITES OF LEFT BREAST IN FEMALE, ESTROGEN RECEPTOR POSITIVE: ICD-10-CM

## 2025-06-17 DIAGNOSIS — R53.1 GENERALIZED WEAKNESS: Primary | ICD-10-CM

## 2025-06-17 DIAGNOSIS — Z17.0 MALIGNANT NEOPLASM OF OVERLAPPING SITES OF LEFT BREAST IN FEMALE, ESTROGEN RECEPTOR POSITIVE: ICD-10-CM

## 2025-06-30 ENCOUNTER — TELEPHONE (OUTPATIENT)
Dept: ONCOLOGY | Facility: CLINIC | Age: 75
End: 2025-06-30
Payer: MEDICARE

## 2025-06-30 NOTE — TELEPHONE ENCOUNTER
RN called patient regarding no-showing CT scans, MRI, and bone scan, and cancelling her office visit with Dr. Tellez.     RN discussed with patient at length what is prohibiting her from making these appointments. In short, patient explained that she has a lot going on with physical therapy three times a week, having a family member over taking care of her five days a week during the day, and she persisted that she is unable to reschedule her appointments at this time despite much encouragement from RN.   Patient asked if RN could speak with Dr. Tellez about prescribing her something for her arm/hand pain. RN reiterated importance of having MRI done for possible diagnosis/reasoning for her pain. Patient verbalized understanding, but again, refused rescheduling at this time and is requesting pain medication.     RN to discuss with Dr. Tellez once she returns to clinic 7/1.

## 2025-07-02 ENCOUNTER — TELEPHONE (OUTPATIENT)
Dept: ONCOLOGY | Facility: CLINIC | Age: 75
End: 2025-07-02
Payer: MEDICARE

## 2025-07-02 NOTE — TELEPHONE ENCOUNTER
"RN called patient's son, Kenneth, after discussing patient call from 6/30 with Dr. Tellez.   RN told Kenneth that Dr. Tellez agreed to prescribe patient Neurontin 100 mg TID since it helped with her pain. Kenneth then explained that he actually had to take patient to her PCP for pain management since, \"Dr. Tellez was not helping and only recommended Tylenol,\" and her PCP has prescribed Neurontin 600 mg q8 hours, and Norco 5 mg q8 hours. He said that he actually never picked up or gave her the Neurontin and Percocet prescribed on 6/16 (when he called the first time explaining she was in pain), even though when RN called to follow up on the patient's pain on 6/17, he said that she had much improved and her pain was relieved enough to be able to sleep for the first time in a month. He mentioned several times that \"[facility]\" and  \"the [[facility] ER\" and Dr. Tellez were doing nothing for the patient and providing contradicting reasons for her pain.   RN explained that Dr. Tellez was trying to find the cause, and ordered MRIs which were scheduled on 6/20 that the patient no-showed, and was going to follow up with the patient regarding those results and further plan of care on 6/26 (which he cancelled). Kenneth explained that other providers, from the ER and the patient's PCP, were blaming the chemotherapy for her nerve pain and hand pain, and he agrees with them. RN emphasized importance of obtaining the MRI of her spine for diagnosis and treatment reasons, as Dr. Tellez has discussed with them that the patient's pain may not be related to side effects of chemotherapy given her specific symptoms and length of time, and more likely related to something involving her cervical spine.   Kenneth refused further scans and treatment at this time, stating, (paraphrased) \"There is no reason to have all these scans... if her cancer is worse that means chemotherapy did not work... even if her cancer has spread, we don't want " "further treatment, she can't handle it... I know that the hospital only pushes people to have these scans to make money.\" RN verbalized understanding regarding his concerns and confirmed that they did not want to pursue further treatment including chemotherapy and radiation, or have further testing, and wanted to focus on managing her current symptoms.   He said the patient is doing much better with physical therapy and the pain medicines her PCP prescribed, but mentioned he wants to \"start backing her off of it... I don't want her on pain medicine for the rest of her life.\" RN explained that if her medication is helping her and her quality of life is better (which is their current treatment goal), it would not be advisable to discontinue her pain regimen and let her live in pain. Kenneth verbalized understanding but again, said he does not want her taking pain medication at such high doses.   Kenneth said if they decide to follow up with Dr. Tellez, he will call the office, but in the meantime the patient is working with physical therapy and they do not want anything else at this time.     Dr. Tellez was present for much of the conversation, and RN discussed call with , Lor.     "

## 2025-07-21 ENCOUNTER — HOSPITAL ENCOUNTER (OUTPATIENT)
Dept: NUCLEAR MEDICINE | Facility: HOSPITAL | Age: 75
Discharge: HOME | End: 2025-07-21
Payer: MEDICARE

## 2025-07-21 DIAGNOSIS — C50.812 MALIGNANT NEOPLASM OF OVERLAPPING SITES OF LEFT BREAST IN FEMALE, ESTROGEN RECEPTOR POSITIVE: ICD-10-CM

## 2025-07-21 DIAGNOSIS — Z17.0 MALIGNANT NEOPLASM OF OVERLAPPING SITES OF LEFT BREAST IN FEMALE, ESTROGEN RECEPTOR POSITIVE: ICD-10-CM

## 2025-07-21 PROCEDURE — A9561 TC99M OXIDRONATE: HCPCS | Performed by: INTERNAL MEDICINE

## 2025-07-21 PROCEDURE — 78306 BONE IMAGING WHOLE BODY: CPT | Performed by: RADIOLOGY

## 2025-07-21 PROCEDURE — 34310000005 TECHNETIUM OXIDRONATE KIT: Performed by: INTERNAL MEDICINE

## 2025-07-21 PROCEDURE — 78306 BONE IMAGING WHOLE BODY: CPT

## 2025-07-21 RX ADMIN — TECHNETIUM TC 99M OXIDRONATE 1 DOSE: 3.15 INJECTION, POWDER, LYOPHILIZED, FOR SOLUTION INTRAVENOUS at 12:00

## 2025-07-24 ENCOUNTER — HOSPITAL ENCOUNTER (OUTPATIENT)
Dept: MRI IMAGING | Facility: HOSPITAL | Age: 75
Discharge: HOME | End: 2025-07-24
Payer: MEDICARE

## 2025-07-24 DIAGNOSIS — R20.2 NUMBNESS AND TINGLING IN LEFT ARM: ICD-10-CM

## 2025-07-24 DIAGNOSIS — C50.812 MALIGNANT NEOPLASM OF OVERLAPPING SITES OF LEFT BREAST IN FEMALE, ESTROGEN RECEPTOR POSITIVE: ICD-10-CM

## 2025-07-24 DIAGNOSIS — R20.2 NUMBNESS AND TINGLING OF RIGHT ARM: ICD-10-CM

## 2025-07-24 DIAGNOSIS — R20.0 NUMBNESS AND TINGLING IN LEFT ARM: ICD-10-CM

## 2025-07-24 DIAGNOSIS — Z17.0 MALIGNANT NEOPLASM OF OVERLAPPING SITES OF LEFT BREAST IN FEMALE, ESTROGEN RECEPTOR POSITIVE: ICD-10-CM

## 2025-07-24 DIAGNOSIS — R20.0 NUMBNESS AND TINGLING OF RIGHT ARM: ICD-10-CM

## 2025-07-24 PROCEDURE — 25510000001 GADOPICLENOL 0.5 MMOL/ML SOLUTION: Performed by: INTERNAL MEDICINE

## 2025-07-24 PROCEDURE — 72156 MRI NECK SPINE W/O & W/DYE: CPT

## 2025-07-24 PROCEDURE — 70553 MRI BRAIN STEM W/O & W/DYE: CPT

## 2025-07-24 PROCEDURE — A9573 GADOPICLENOL 0.5 MMOL/ML SOLUTION: HCPCS | Performed by: INTERNAL MEDICINE

## 2025-07-24 RX ADMIN — GADOPICLENOL 9 ML: 485.1 INJECTION INTRAVENOUS at 12:00

## 2025-08-04 RX ORDER — POTASSIUM CHLORIDE 1500 MG/1
20 TABLET, EXTENDED RELEASE ORAL 2 TIMES DAILY
Qty: 6 TABLET | Refills: 0 | OUTPATIENT
Start: 2025-08-04

## 2025-08-14 ENCOUNTER — HOSPITAL ENCOUNTER (OUTPATIENT)
Facility: HOSPITAL | Age: 75
Discharge: HOME | End: 2025-08-14
Payer: MEDICARE

## 2025-08-14 DIAGNOSIS — C50.812 MALIGNANT NEOPLASM OF OVERLAPPING SITES OF LEFT BREAST IN FEMALE, ESTROGEN RECEPTOR POSITIVE: ICD-10-CM

## 2025-08-14 DIAGNOSIS — Z17.0 MALIGNANT NEOPLASM OF OVERLAPPING SITES OF LEFT BREAST IN FEMALE, ESTROGEN RECEPTOR POSITIVE: ICD-10-CM

## 2025-08-14 LAB — CREAT BLDA-MCNC: 0.9 MG/DL (ref 0.6–1.3)

## 2025-08-14 PROCEDURE — 82565 ASSAY OF CREATININE: CPT

## 2025-08-14 PROCEDURE — 25510000001 IOPAMIDOL 61 % SOLUTION: Performed by: INTERNAL MEDICINE

## 2025-08-14 PROCEDURE — 71260 CT THORAX DX C+: CPT

## 2025-08-14 PROCEDURE — 74177 CT ABD & PELVIS W/CONTRAST: CPT

## 2025-08-14 RX ORDER — IOPAMIDOL 612 MG/ML
100 INJECTION, SOLUTION INTRAVASCULAR
Status: COMPLETED | OUTPATIENT
Start: 2025-08-14 | End: 2025-08-14

## 2025-08-14 RX ADMIN — IOPAMIDOL 100 ML: 612 INJECTION, SOLUTION INTRAVENOUS at 15:28

## 2025-08-15 ENCOUNTER — TELEPHONE (OUTPATIENT)
Dept: ONCOLOGY | Facility: CLINIC | Age: 75
End: 2025-08-15
Payer: MEDICARE

## 2025-08-26 ENCOUNTER — TELEPHONE (OUTPATIENT)
Dept: ONCOLOGY | Facility: CLINIC | Age: 75
End: 2025-08-26
Payer: MEDICARE

## (undated) DEVICE — SUT MNCRYL PLS ANTIB UD 4/0 PS2 18IN

## (undated) DEVICE — GOWN,NON-REINFORCED,SIRUS,SET IN SLV,XXL: Brand: MEDLINE

## (undated) DEVICE — GLV SURG BIOGEL SENSR LTX PF SZ6.5

## (undated) DEVICE — HYPODERMIC SAFETY NEEDLE: Brand: MONOJECT

## (undated) DEVICE — INTENDED FOR TISSUE SEPARATION, AND OTHER PROCEDURES THAT REQUIRE A SHARP SURGICAL BLADE TO PUNCTURE OR CUT.: Brand: BARD-PARKER ® CARBON RIB-BACK BLADES

## (undated) DEVICE — DRP C/ARM W/BAND W/CLIPS 41X74IN

## (undated) DEVICE — PENCL SMOKE/EVAC MEGADYNE TELESCP 10FT

## (undated) DEVICE — DRAPE,T,LAPARO,TRANS,STERILE: Brand: MEDLINE

## (undated) DEVICE — SYR LL TP 10ML STRL

## (undated) DEVICE — PK BASIC 70

## (undated) DEVICE — CVR PROB ULTRASND CIVFLEX GEN/PURP TELESCP/FOLD 5.5X58IN LF

## (undated) DEVICE — ELECTRD BLD EZ CLN MOD XLNG 2.75IN

## (undated) DEVICE — SYR LUERLOK 30CC

## (undated) DEVICE — PATIENT RETURN ELECTRODE, SINGLE-USE, CONTACT QUALITY MONITORING, ADULT, WITH 9FT CORD, FOR PATIENTS WEIGING OVER 33LBS. (15KG): Brand: MEGADYNE

## (undated) DEVICE — SUT PROLN 3/0 8832H

## (undated) DEVICE — HOLDER: Brand: DEROYAL

## (undated) DEVICE — SUT VIC 3/0 SH 27IN J416H